# Patient Record
Sex: MALE | Race: WHITE | NOT HISPANIC OR LATINO | Employment: OTHER | URBAN - METROPOLITAN AREA
[De-identification: names, ages, dates, MRNs, and addresses within clinical notes are randomized per-mention and may not be internally consistent; named-entity substitution may affect disease eponyms.]

---

## 2017-07-17 ENCOUNTER — HOSPITAL ENCOUNTER (INPATIENT)
Facility: MEDICAL CENTER | Age: 67
LOS: 3 days | DRG: 563 | End: 2017-07-20
Attending: EMERGENCY MEDICINE | Admitting: INTERNAL MEDICINE
Payer: MEDICARE

## 2017-07-17 ENCOUNTER — APPOINTMENT (OUTPATIENT)
Dept: RADIOLOGY | Facility: MEDICAL CENTER | Age: 67
DRG: 563 | End: 2017-07-17
Attending: EMERGENCY MEDICINE
Payer: MEDICARE

## 2017-07-17 ENCOUNTER — RESOLUTE PROFESSIONAL BILLING HOSPITAL PROF FEE (OUTPATIENT)
Dept: HOSPITALIST | Facility: MEDICAL CENTER | Age: 67
End: 2017-07-17
Payer: MEDICARE

## 2017-07-17 DIAGNOSIS — G89.4 CHRONIC PAIN SYNDROME: ICD-10-CM

## 2017-07-17 DIAGNOSIS — G82.20 PARAPLEGIA (HCC): ICD-10-CM

## 2017-07-17 DIAGNOSIS — M25.532 LEFT WRIST PAIN: ICD-10-CM

## 2017-07-17 PROBLEM — M25.539 WRIST PAIN: Status: ACTIVE | Noted: 2017-07-17

## 2017-07-17 PROBLEM — R53.81 DEBILITY: Status: ACTIVE | Noted: 2017-07-17

## 2017-07-17 PROBLEM — M54.9 BACK PAIN: Status: ACTIVE | Noted: 2017-07-17

## 2017-07-17 LAB
ALBUMIN SERPL BCP-MCNC: 3.2 G/DL (ref 3.2–4.9)
ALBUMIN/GLOB SERPL: 0.8 G/DL
ALP SERPL-CCNC: 52 U/L (ref 30–99)
ALT SERPL-CCNC: 17 U/L (ref 2–50)
ANION GAP SERPL CALC-SCNC: 17 MMOL/L (ref 0–11.9)
APTT PPP: 33.7 SEC (ref 24.7–36)
AST SERPL-CCNC: 29 U/L (ref 12–45)
BASOPHILS # BLD AUTO: 0.3 % (ref 0–1.8)
BASOPHILS # BLD: 0.01 K/UL (ref 0–0.12)
BILIRUB SERPL-MCNC: 1.7 MG/DL (ref 0.1–1.5)
BUN SERPL-MCNC: 14 MG/DL (ref 8–22)
CALCIUM SERPL-MCNC: 8.6 MG/DL (ref 8.4–10.2)
CHLORIDE SERPL-SCNC: 103 MMOL/L (ref 96–112)
CK SERPL-CCNC: 107 U/L (ref 0–154)
CO2 SERPL-SCNC: 15 MMOL/L (ref 20–33)
CREAT SERPL-MCNC: 0.87 MG/DL (ref 0.5–1.4)
EOSINOPHIL # BLD AUTO: 0.1 K/UL (ref 0–0.51)
EOSINOPHIL NFR BLD: 2.6 % (ref 0–6.9)
ERYTHROCYTE [DISTWIDTH] IN BLOOD BY AUTOMATED COUNT: 40.5 FL (ref 35.9–50)
GFR SERPL CREATININE-BSD FRML MDRD: >60 ML/MIN/1.73 M 2
GLOBULIN SER CALC-MCNC: 4.1 G/DL (ref 1.9–3.5)
GLUCOSE SERPL-MCNC: 89 MG/DL (ref 65–99)
HCT VFR BLD AUTO: 44.2 % (ref 42–52)
HGB BLD-MCNC: 15.6 G/DL (ref 14–18)
IMM GRANULOCYTES # BLD AUTO: 0.01 K/UL (ref 0–0.11)
IMM GRANULOCYTES NFR BLD AUTO: 0.3 % (ref 0–0.9)
INR PPP: 1.03 (ref 0.87–1.13)
LYMPHOCYTES # BLD AUTO: 0.81 K/UL (ref 1–4.8)
LYMPHOCYTES NFR BLD: 20.9 % (ref 22–41)
MAGNESIUM SERPL-MCNC: 1.8 MG/DL (ref 1.5–2.5)
MCH RBC QN AUTO: 32.1 PG (ref 27–33)
MCHC RBC AUTO-ENTMCNC: 35.3 G/DL (ref 33.7–35.3)
MCV RBC AUTO: 90.9 FL (ref 81.4–97.8)
MONOCYTES # BLD AUTO: 0.35 K/UL (ref 0–0.85)
MONOCYTES NFR BLD AUTO: 9 % (ref 0–13.4)
NEUTROPHILS # BLD AUTO: 2.59 K/UL (ref 1.82–7.42)
NEUTROPHILS NFR BLD: 66.9 % (ref 44–72)
NRBC # BLD AUTO: 0 K/UL
NRBC BLD AUTO-RTO: 0 /100 WBC
PHOSPHATE SERPL-MCNC: 3.7 MG/DL (ref 2.5–4.5)
PLATELET # BLD AUTO: 168 K/UL (ref 164–446)
PMV BLD AUTO: 10.8 FL (ref 9–12.9)
POTASSIUM SERPL-SCNC: 3.6 MMOL/L (ref 3.6–5.5)
PROT SERPL-MCNC: 7.3 G/DL (ref 6–8.2)
PROTHROMBIN TIME: 13.3 SEC (ref 12–14.6)
RBC # BLD AUTO: 4.86 M/UL (ref 4.7–6.1)
SODIUM SERPL-SCNC: 135 MMOL/L (ref 135–145)
WBC # BLD AUTO: 3.9 K/UL (ref 4.8–10.8)

## 2017-07-17 PROCEDURE — A9270 NON-COVERED ITEM OR SERVICE: HCPCS | Performed by: INTERNAL MEDICINE

## 2017-07-17 PROCEDURE — 72128 CT CHEST SPINE W/O DYE: CPT

## 2017-07-17 PROCEDURE — 700105 HCHG RX REV CODE 258: Performed by: INTERNAL MEDICINE

## 2017-07-17 PROCEDURE — 94760 N-INVAS EAR/PLS OXIMETRY 1: CPT

## 2017-07-17 PROCEDURE — 99285 EMERGENCY DEPT VISIT HI MDM: CPT

## 2017-07-17 PROCEDURE — 85610 PROTHROMBIN TIME: CPT

## 2017-07-17 PROCEDURE — 700102 HCHG RX REV CODE 250 W/ 637 OVERRIDE(OP): Performed by: INTERNAL MEDICINE

## 2017-07-17 PROCEDURE — 85730 THROMBOPLASTIN TIME PARTIAL: CPT

## 2017-07-17 PROCEDURE — 36415 COLL VENOUS BLD VENIPUNCTURE: CPT

## 2017-07-17 PROCEDURE — 82550 ASSAY OF CK (CPK): CPT

## 2017-07-17 PROCEDURE — 85025 COMPLETE CBC W/AUTO DIFF WBC: CPT

## 2017-07-17 PROCEDURE — 73110 X-RAY EXAM OF WRIST: CPT | Mod: LT

## 2017-07-17 PROCEDURE — 84100 ASSAY OF PHOSPHORUS: CPT

## 2017-07-17 PROCEDURE — 83735 ASSAY OF MAGNESIUM: CPT

## 2017-07-17 PROCEDURE — 700105 HCHG RX REV CODE 258: Performed by: EMERGENCY MEDICINE

## 2017-07-17 PROCEDURE — 99223 1ST HOSP IP/OBS HIGH 75: CPT | Performed by: INTERNAL MEDICINE

## 2017-07-17 PROCEDURE — 80053 COMPREHEN METABOLIC PANEL: CPT

## 2017-07-17 PROCEDURE — 71010 DX-CHEST-PORTABLE (1 VIEW): CPT

## 2017-07-17 PROCEDURE — 72131 CT LUMBAR SPINE W/O DYE: CPT

## 2017-07-17 PROCEDURE — 770006 HCHG ROOM/CARE - MED/SURG/GYN SEMI*

## 2017-07-17 RX ORDER — AMOXICILLIN 250 MG
2 CAPSULE ORAL 2 TIMES DAILY
Status: DISCONTINUED | OUTPATIENT
Start: 2017-07-17 | End: 2017-07-20 | Stop reason: HOSPADM

## 2017-07-17 RX ORDER — LORAZEPAM 2 MG/ML
2 INJECTION INTRAMUSCULAR PRN
Status: ACTIVE | OUTPATIENT
Start: 2017-07-17 | End: 2017-07-17

## 2017-07-17 RX ORDER — LORAZEPAM 2 MG/ML
0.5 INJECTION INTRAMUSCULAR EVERY 6 HOURS PRN
Status: DISCONTINUED | OUTPATIENT
Start: 2017-07-17 | End: 2017-07-20 | Stop reason: HOSPADM

## 2017-07-17 RX ORDER — OXYCODONE HYDROCHLORIDE 5 MG/1
5 TABLET ORAL
Status: DISCONTINUED | OUTPATIENT
Start: 2017-07-17 | End: 2017-07-20 | Stop reason: HOSPADM

## 2017-07-17 RX ORDER — BACLOFEN 10 MG/1
10 TABLET ORAL 3 TIMES DAILY
Status: DISCONTINUED | OUTPATIENT
Start: 2017-07-17 | End: 2017-07-20 | Stop reason: HOSPADM

## 2017-07-17 RX ORDER — LOSARTAN POTASSIUM 100 MG/1
100 TABLET ORAL
Status: ON HOLD | COMMUNITY
End: 2017-07-20

## 2017-07-17 RX ORDER — LABETALOL HYDROCHLORIDE 5 MG/ML
10 INJECTION, SOLUTION INTRAVENOUS EVERY 4 HOURS PRN
Status: DISCONTINUED | OUTPATIENT
Start: 2017-07-17 | End: 2017-07-20 | Stop reason: HOSPADM

## 2017-07-17 RX ORDER — POLYETHYLENE GLYCOL 3350 17 G/17G
1 POWDER, FOR SOLUTION ORAL
Status: DISCONTINUED | OUTPATIENT
Start: 2017-07-17 | End: 2017-07-20 | Stop reason: HOSPADM

## 2017-07-17 RX ORDER — LORAZEPAM 1 MG/1
1 TABLET ORAL
Status: DISCONTINUED | OUTPATIENT
Start: 2017-07-17 | End: 2017-07-20 | Stop reason: HOSPADM

## 2017-07-17 RX ORDER — ONDANSETRON 2 MG/ML
4 INJECTION INTRAMUSCULAR; INTRAVENOUS EVERY 4 HOURS PRN
Status: DISCONTINUED | OUTPATIENT
Start: 2017-07-17 | End: 2017-07-20 | Stop reason: HOSPADM

## 2017-07-17 RX ORDER — ONDANSETRON 4 MG/1
4 TABLET, ORALLY DISINTEGRATING ORAL EVERY 4 HOURS PRN
Status: DISCONTINUED | OUTPATIENT
Start: 2017-07-17 | End: 2017-07-20 | Stop reason: HOSPADM

## 2017-07-17 RX ORDER — BISACODYL 10 MG
10 SUPPOSITORY, RECTAL RECTAL
Status: DISCONTINUED | OUTPATIENT
Start: 2017-07-17 | End: 2017-07-20 | Stop reason: HOSPADM

## 2017-07-17 RX ORDER — ACETAMINOPHEN 325 MG/1
650 TABLET ORAL EVERY 6 HOURS PRN
Status: DISCONTINUED | OUTPATIENT
Start: 2017-07-17 | End: 2017-07-20 | Stop reason: HOSPADM

## 2017-07-17 RX ORDER — ACETYLCYSTEINE 200 MG/ML
3 SOLUTION ORAL; RESPIRATORY (INHALATION) 4 TIMES DAILY
Status: DISCONTINUED | OUTPATIENT
Start: 2017-07-17 | End: 2017-07-18 | Stop reason: ALTCHOICE

## 2017-07-17 RX ORDER — LORAZEPAM 1 MG/1
0.5 TABLET ORAL EVERY 6 HOURS PRN
Status: DISCONTINUED | OUTPATIENT
Start: 2017-07-17 | End: 2017-07-20 | Stop reason: HOSPADM

## 2017-07-17 RX ORDER — SODIUM CHLORIDE 9 MG/ML
1000 INJECTION, SOLUTION INTRAVENOUS ONCE
Status: COMPLETED | OUTPATIENT
Start: 2017-07-17 | End: 2017-07-17

## 2017-07-17 RX ORDER — MORPHINE SULFATE 4 MG/ML
2 INJECTION, SOLUTION INTRAMUSCULAR; INTRAVENOUS
Status: DISCONTINUED | OUTPATIENT
Start: 2017-07-17 | End: 2017-07-20 | Stop reason: HOSPADM

## 2017-07-17 RX ORDER — SODIUM CHLORIDE, SODIUM LACTATE, POTASSIUM CHLORIDE, CALCIUM CHLORIDE 600; 310; 30; 20 MG/100ML; MG/100ML; MG/100ML; MG/100ML
INJECTION, SOLUTION INTRAVENOUS CONTINUOUS
Status: DISCONTINUED | OUTPATIENT
Start: 2017-07-17 | End: 2017-07-18

## 2017-07-17 RX ORDER — OXYCODONE HYDROCHLORIDE 5 MG/1
2.5 TABLET ORAL
Status: DISCONTINUED | OUTPATIENT
Start: 2017-07-17 | End: 2017-07-20 | Stop reason: HOSPADM

## 2017-07-17 RX ADMIN — OXYCODONE HYDROCHLORIDE 5 MG: 5 TABLET ORAL at 20:33

## 2017-07-17 RX ADMIN — LORAZEPAM 1 MG: 1 TABLET ORAL at 22:38

## 2017-07-17 RX ADMIN — OXYCODONE HYDROCHLORIDE 5 MG: 5 TABLET ORAL at 17:21

## 2017-07-17 RX ADMIN — BACLOFEN 10 MG: 10 TABLET ORAL at 19:27

## 2017-07-17 RX ADMIN — SODIUM CHLORIDE, POTASSIUM CHLORIDE, SODIUM LACTATE AND CALCIUM CHLORIDE: 600; 310; 30; 20 INJECTION, SOLUTION INTRAVENOUS at 17:18

## 2017-07-17 RX ADMIN — LORAZEPAM 0.5 MG: 1 TABLET ORAL at 17:19

## 2017-07-17 RX ADMIN — SODIUM CHLORIDE 1000 ML: 9 INJECTION, SOLUTION INTRAVENOUS at 14:49

## 2017-07-17 ASSESSMENT — LIFESTYLE VARIABLES
TOTAL SCORE: 0
EVER FELT BAD OR GUILTY ABOUT YOUR DRINKING: NO
HAVE YOU EVER FELT YOU SHOULD CUT DOWN ON YOUR DRINKING: NO
TOTAL SCORE: 0
CONSUMPTION TOTAL: POSITIVE
HAVE PEOPLE ANNOYED YOU BY CRITICIZING YOUR DRINKING: NO
AVERAGE NUMBER OF DAYS PER WEEK YOU HAVE A DRINK CONTAINING ALCOHOL: 7
ALCOHOL_USE: YES
ON A TYPICAL DAY WHEN YOU DRINK ALCOHOL HOW MANY DRINKS DO YOU HAVE: 3
EVER_SMOKED: NEVER
HOW MANY TIMES IN THE PAST YEAR HAVE YOU HAD 5 OR MORE DRINKS IN A DAY: 0
EVER HAD A DRINK FIRST THING IN THE MORNING TO STEADY YOUR NERVES TO GET RID OF A HANGOVER: NO
EVER_SMOKED: NEVER
TOTAL SCORE: 0

## 2017-07-17 ASSESSMENT — PAIN SCALES - GENERAL
PAINLEVEL_OUTOF10: 3
PAINLEVEL_OUTOF10: 3
PAINLEVEL_OUTOF10: 8

## 2017-07-17 NOTE — ED NOTES
Medicinal interventions carried out per ERP orders.  Hospitalist in to evaluate patient for further treatment. Will continue to monitor.
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
## 2017-07-17 NOTE — IP AVS SNAPSHOT
Cognection Access Code: [email or web address]  Expires: 8/19/2017  2:12 PM    Your email address is not on file at [facility].  Email Addresses are required for you to sign up for Cognection, please contact [phone] to verify your personal information and to provide your email address prior to attempting to register for Cognection.    Bernardino Hawthorne  [address]  [place] [address]    Cognection  A secure, online tool to manage your health information     [facility]’s Cognection® is a secure, online tool that connects you to your personalized health information from the privacy of your home -- day or night - making it very easy for you to manage your healthcare. Once the activation process is completed, you can even access your medical information using the Cognection janae, which is available for free in the Apple Janae store or Google Play store.     To learn more about Cognection, visit [email or web address]    There are two levels of access available (as shown below):   My Chart Features  [facility] Primary Care Doctor [facility]  Specialists [facility]  Urgent  Care Non-[facility] Primary Care Doctor   Email your healthcare team securely and privately 24/7 X X X    Manage appointments: schedule your next appointment; view details of past/upcoming appointments X      Request prescription refills. X      View recent personal medical records, including lab and immunizations X X X X   View health record, including health history, allergies, medications X X X X   Read reports about your outpatient visits, procedures, consult and ER notes X X X X   See your discharge summary, which is a recap of your hospital and/or ER visit that includes your diagnosis, lab results, and care plan X X  X     How to register for Rue89t:  Once your e-mail address has been verified, follow the following steps to sign up for Rue89t.     1. Go to  [email or web address]  2. Click on the Sign Up Now box, which takes you to the New Member Sign Up page. You  will need to provide the following information:  a. Enter your Jumblets Access Code exactly as it appears at the top of this page. (You will not need to use this code after you’ve completed the sign-up process. If you do not sign up before the expiration date, you must request a new code.)   b. Enter your date of birth.   c. Enter your home email address.   d. Click Submit, and follow the next screen’s instructions.  3. Create a itembaset ID. This will be your Jumblets login ID and cannot be changed, so think of one that is secure and easy to remember.  4. Create a Jumblets password. You can change your password at any time.  5. Enter your Password Reset Question and Answer. This can be used at a later time if you forget your password.   6. Enter your e-mail address. This allows you to receive e-mail notifications when new information is available in Jumblets.  7. Click Sign Up. You can now view your health information.    For assistance activating your Jumblets account, call [phone]

## 2017-07-17 NOTE — IP AVS SNAPSHOT
" <p align=\"LEFT\"><IMG SRC=\"//[email or web address]$/[email or web address]\" alt=\"Image\" WIDTH=\"50%\" HEIGHT=\"200\" BORDER=\"\"></p>                   Name:Bernardino Hawthorne  Medical Record Number:[id number]  CSN: [id number]    YOB: 1950   Age: 67 y.o.  Sex: male  HT:1.93 m (6' 4\") WT: 90.719 kg (200 lb)          Admit Date: 7/17/2017     Discharge Date:   Today's Date: 7/20/2017  Attending Doctor:  Brant Aponte M.D.                  Allergies:  Penicillins          Your appointments     Jul 21, 2017  3:00 PM   CARE MANAGER TELEPHONE VISIT with CARE MANAGER   [address] [id number] [phone]           ***IMPORTANT**** This is a phone visit only. Do not come into the office. The Care Manager will call you at the scheduled time for Care Manager Telephone Visit.            Jul 25, 2017  3:00 PM   Established Patient with NAHOMY Potter   [address] [id number] [phone]           You will be receiving a confirmation call a few days before your appointment from our automated call confirmation system.              Follow-up Information     1. Follow up with Annmarie Terrell M.D. In 1 week.    Specialty:  Family Medicine    Contact information    [address]  [facility] [id number] [phone]           Medication List      Take these Medications        Instructions    ATIVAN 1 MG Tabs   Generic drug:  lorazepam    Take 1 mg by mouth every bedtime.   Dose:  1 mg       baclofen 10 MG Tabs   Commonly known as:  LIORESAL    Take 1 Tab by mouth every 8 hours as needed (spasms).   Dose:  10 mg       oxycodone immediate-release 5 MG Tabs   Commonly known as:  ROXICODONE    Take 1 Tab by mouth every 6 hours as needed (moderate- severe pain).   Dose:  5 mg         "
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
## 2017-07-17 NOTE — IP AVS SNAPSHOT
" Home Care Instructions                                                                                                                  Name:Bernardino Hawthorne  Medical Record Number:[id number]  CSN: [id number]    YOB: 1950   Age: 67 y.o.  Sex: male  HT:1.93 m (6' 4\") WT: 90.719 kg (200 lb)          Admit Date: 7/17/2017     Discharge Date:   Today's Date: 7/20/2017  Attending Doctor:  Brant Aponte M.D.                  Allergies:  Penicillins            Discharge Instructions       Discharge Instructions    Discharged to home by taxi with self. Discharged via wheelchair, hospital escort: Yes.  Special equipment needed: Wheelchair    Be sure to schedule a follow-up appointment with your primary care doctor or any specialists as instructed.     Discharge Plan:   Diet Plan: Discussed  Activity Level: Discussed  Confirmed Follow up Appointment: Appointment Scheduled  Confirmed Symptoms Management: Discussed  Medication Reconciliation Updated: Yes  Influenza Vaccine Indication: Patient Refuses    I understand that a diet low in cholesterol, fat, and sodium is recommended for good health. Unless I have been given specific instructions below for another diet, I accept this instruction as my diet prescription.   Other diet: as tolerated    Special Instructions: None    · Is patient discharged on Warfarin / Coumadin?   No     · Is patient Post Blood Transfusion?  No  Wrist Pain  Wrist injuries are frequent in adults and children. A sprain is an injury to the ligaments that hold your bones together. A strain is an injury to muscle or muscle cord-like structures (tendons) from stretching or pulling. Generally, when wrists are moderately tender to touch following a fall or injury, a break in the bone (fracture) may be present. Most wrist sprains or strains are better in 3 to 5 days, but complete healing may take several weeks.  HOME CARE INSTRUCTIONS   · Put ice on the injured area.  · Put ice in a plastic bag.  · Place a " towel between your skin and the bag.  · Leave the ice on for 15-20 minutes, 3-4 times a day, for the first 2 days, or as directed by your health care provider.  · Keep your arm raised above the level of your heart whenever possible to reduce swelling and pain.  · Rest the injured area for at least 48 hours or as directed by your health care provider.  · If a splint or elastic bandage has been applied, use it for as long as directed by your health care provider or until seen by a health care provider for a follow-up exam.  · Only take over-the-counter or prescription medicines for pain, discomfort, or fever as directed by your health care provider.  · Keep all follow-up appointments. You may need to follow up with a specialist or have follow-up X-rays. Improvement in pain level is not a guarantee that you did not fracture a bone in your wrist. The only way to determine whether or not you have a broken bone is by X-ray.  SEEK IMMEDIATE MEDICAL CARE IF:   · Your fingers are swollen, very red, white, or cold and blue.  · Your fingers are numb or tingling.  · You have increasing pain.  · You have difficulty moving your fingers.  MAKE SURE YOU:   · Understand these instructions.  · Will watch your condition.  · Will get help right away if you are not doing well or get worse.     This information is not intended to replace advice given to you by your health care provider. Make sure you discuss any questions you have with your health care provider.     Document Released: 09/27/2006 Document Revised: 12/23/2014 Document Reviewed: 02/08/2012  [employer] Interactive Patient Education ©2016 [employer]  Back Pain, Adult  Back pain is very common in adults. The cause of back pain is rarely dangerous and the pain often gets better over time. The cause of your back pain may not be known. Some common causes of back pain include:  · Strain of the muscles or ligaments supporting the spine.  · Wear and tear (degeneration) of the  spinal disks.  · Arthritis.  · Direct injury to the back.  For many people, back pain may return. Since back pain is rarely dangerous, most people can learn to manage this condition on their own.  HOME CARE INSTRUCTIONS  Watch your back pain for any changes. The following actions may help to lessen any discomfort you are feeling:  · Remain active. It is stressful on your back to sit or  one place for long periods of time. Do not sit, drive, or  one place for more than 30 minutes at a time. Take short walks on even surfaces as soon as you are able. Try to increase the length of time you walk each day.  · Exercise regularly as directed by your health care provider. Exercise helps your back heal faster. It also helps avoid future injury by keeping your muscles strong and flexible.  · Do not stay in bed. Resting more than 1-2 days can delay your recovery.  · Pay attention to your body when you bend and lift. The most comfortable positions are those that put less stress on your recovering back. Always use proper lifting techniques, including:  ¨ Bending your knees.  ¨ Keeping the load close to your body.  ¨ Avoiding twisting.  · Find a comfortable position to sleep. Use a firm mattress and lie on your side with your knees slightly bent. If you lie on your back, put a pillow under your knees.  · Avoid feeling anxious or stressed. Stress increases muscle tension and can worsen back pain. It is important to recognize when you are anxious or stressed and learn ways to manage it, such as with exercise.  · Take medicines only as directed by your health care provider. Over-the-counter medicines to reduce pain and inflammation are often the most helpful. Your health care provider may prescribe muscle relaxant drugs. These medicines help dull your pain so you can more quickly return to your normal activities and healthy exercise.  · Apply ice to the injured area:  ¨ Put ice in a plastic bag.  ¨ Place a towel  between your skin and the bag.  ¨ Leave the ice on for 20 minutes, 2-3 times a day for the first 2-3 days. After that, ice and heat may be alternated to reduce pain and spasms.  · Maintain a healthy weight. Excess weight puts extra stress on your back and makes it difficult to maintain good posture.  SEEK MEDICAL CARE IF:  · You have pain that is not relieved with rest or medicine.  · You have increasing pain going down into the legs or buttocks.  · You have pain that does not improve in one week.  · You have night pain.  · You lose weight.  · You have a fever or chills.  SEEK IMMEDIATE MEDICAL CARE IF:   · You develop new bowel or bladder control problems.  · You have unusual weakness or numbness in your arms or legs.  · You develop nausea or vomiting.  · You develop abdominal pain.  · You feel faint.     This information is not intended to replace advice given to you by your health care provider. Make sure you discuss any questions you have with your health care provider.     Document Released: 12/18/2006 Document Revised: 01/08/2016 Document Reviewed: 04/21/2015  [employer] Interactive Patient Education ©2016 [employer]      Depression / Suicide Risk    As you are discharged from this [facility] facility, it is important to learn how to keep safe from harming yourself.    Recognize the warning signs:  · Abrupt changes in personality, positive or negative- including increase in energy   · Giving away possessions  · Change in eating patterns- significant weight changes-  positive or negative  · Change in sleeping patterns- unable to sleep or sleeping all the time   · Unwillingness or inability to communicate  · Depression  · Unusual sadness, discouragement and loneliness  · Talk of wanting to die  · Neglect of personal appearance   · Rebelliousness- reckless behavior  · Withdrawal from people/activities they love  · Confusion- inability to concentrate     If you or a loved one observes any of these behaviors or  has concerns about self-harm, here's what you can do:  · Talk about it- your feelings and reasons for harming yourself  · Remove any means that you might use to hurt yourself (examples: pills, rope, extension cords, firearm)  · Get professional help from the community (Mental Health, Substance Abuse, psychological counseling)  · Do not be alone:Call your Safe Contact- someone whom you trust who will be there for you.  · Call your local CRISIS HOTLINE 557-0560 or 402-559-7848  · Call your local Children's Mobile Crisis Response Team [place] (466) 845-2997 or www.vzaar  · Call the toll free National Suicide Prevention Hotlines   · National Suicide Prevention Lifeline 196-667-DFKQ (4161)  · [employer] Line Network 800-SUICIDE (746-9960)        Your appointments     Jul 21, 2017  3:00 PM   CARE MANAGER TELEPHONE VISIT with CARE MANAGER   [address]  [facility] [address] [phone]           ***IMPORTANT**** This is a phone visit only. Do not come into the office. The Care Manager will call you at the scheduled time for Care Manager Telephone Visit.            Jul 25, 2017  3:00 PM   Established Patient with NAHOMY Potter   [address]  [facility] [id number] [phone]           You will be receiving a confirmation call a few days before your appointment from our automated call confirmation system.              Follow-up Information     1. Follow up with Annmarie Terrell M.D. In 1 week.    Specialty:  Family Medicine    Contact information    [address] [facility] [address]  [facility] [id number] [phone]           Discharge Medication Instructions:    Below are the medications your physician expects you to take upon discharge:    Review all your home medications and newly ordered medications with your doctor and/or pharmacist. Follow medication instructions as directed by your doctor and/or  pharmacist.    Please keep your medication list with you and share with your physician.               Medication List      START taking these medications        Instructions    Morning Afternoon Evening Bedtime    baclofen 10 MG Tabs   Last time this was given:  10 mg on 7/19/2017  8:57 AM   Commonly known as:  LIORESAL        Take 1 Tab by mouth every 8 hours as needed (spasms).   Dose:  10 mg                        oxycodone immediate-release 5 MG Tabs   Last time this was given:  5 mg on 7/20/2017 10:39 AM   Commonly known as:  ROXICODONE        Take 1 Tab by mouth every 6 hours as needed (moderate- severe pain).   Dose:  5 mg                          CONTINUE taking these medications        Instructions    Morning Afternoon Evening Bedtime    ATIVAN 1 MG Tabs   Last time this was given:  1 mg on 7/17/2017 10:38 PM   Generic drug:  lorazepam        Take 1 mg by mouth every bedtime.   Dose:  1 mg                          STOP taking these medications     losartan 100 MG Tabs   Commonly known as:  COZAAR                    Where to Get Your Medications      These medications were sent to [facility][id number] - [place] - [address]  [address] [facility] [address]     Phone:  [phone]    - baclofen 10 MG Tabs      You can get these medications from any pharmacy     Bring a paper prescription for each of these medications    - oxycodone immediate-release 5 MG Tabs            Orders for after discharge     DME Other    Complete by:  As directed        REFERRAL TO HOME HEALTH    Complete by:  As directed    Home health will create and establish a plan of care             Instructions           Diet / Nutrition:    Follow any diet instructions given to you by your doctor or the dietician, including how much salt (sodium) you are allowed each day.    If you are overweight, talk to your doctor about a weight reduction plan.    Activity:    Remain physically active following your doctor's instructions about  exercise and activity.    Rest often.     Any time you become even a little tired or short of breath, SIT DOWN and rest.    Worsening Symptoms:    Report any of the following signs and symptoms to the doctor's office immediately:    *Pain of jaw, arm, or neck  *Chest pain not relieved by medication                               *Dizziness or loss of consciousness  *Difficulty breathing even when at rest   *More tired than usual                                       *Bleeding drainage or swelling of surgical site  *Swelling of feet, ankles, legs or stomach                 *Fever (>100ºF)  *Pink or blood tinged sputum  *Weight gain (3lbs/day or 5lbs /week)           *Shock from internal defibrillator (if applicable)  *Palpitations or irregular heartbeats                *Cool and/or numb extremities    Stroke Awareness    Common Risk Factors for Stroke include:    Age  Atrial Fibrillation  Carotid Artery Stenosis  Diabetes Mellitus  Excessive alcohol consumption  High blood pressure  Overweight   Physical inactivity  Smoking    Warning signs and symptoms of a stroke include:    *Sudden numbness or weakness of the face, arm or leg (especially on one side of the body).  *Sudden confusion, trouble speaking or understanding.  *Sudden trouble seeing in one or both eyes.  *Sudden trouble walking, dizziness, loss of balance or coordination.Sudden severe headache with no known cause.    It is very important to get treatment quickly when a stroke occurs. If you experience any of the above warning signs, call 911 immediately.                   Disclaimer         Quit Smoking / Tobacco Use:    I understand the use of any tobacco products increases my chance of suffering from future heart disease or stroke and could cause other illnesses which may shorten my life. Quitting the use of tobacco products is the single most important thing I can do to improve my health. For further information on smoking / tobacco cessation call a Toll  Free Quit Line at 1-896.157.3011 (*National Cancer [place]) or 1-593.633.4551 (American Lung Association) or you can access the web based program at www.lungusa.org.    [place] Tobacco Users Help Line:  (684) 962-1760       Toll Free: 1-351.487.2968  Quit Tobacco Program [facility] (648)586-0928    Crisis Hotline:    [place] Crisis Hotline:  6-202-STJMXKT or 1-776.359.4660    [place] Crisis Hotline:    1-360.455.6351 or 312-873-9168    Discharge Survey:   Thank you for choosing [facility]. We hope we did everything we could to make your hospital stay a pleasant one. You may be receiving a phone survey and we would appreciate your time and participation in answering the questions. Your input is very valuable to us in our efforts to improve our service to our patients and their families.        My signature on this form indicates that:    1. I have reviewed and understand the above information.  2. My questions regarding this information have been answered to my satisfaction.  3. I have formulated a plan with my discharge nurse to obtain my prescribed medications for home.                  Disclaimer         __________________________________                     __________       ________                       Patient Signature                                                 Date                    Time

## 2017-07-17 NOTE — IP AVS SNAPSHOT
7/20/2017    Bernardino Hawthorne  [address]  [place] [address]    Dear Bernardino:    [facility] wants to ensure your discharge home is safe and you or your loved ones have had all of your questions answered regarding your care after you leave the hospital.    Below is a list of resources and contact information should you have any questions regarding your hospital stay, follow-up instructions, or active medical symptoms.    Questions or Concerns Regarding… Contact   Medical Questions Related to Your Discharge  (7 days a week, 8am-5pm) Contact a Nurse Care Coordinator   [phone]   Medical Questions Not Related to Your Discharge  (24 hours a day / 7 days a week)  Contact the Nurse Health Line   [phone]    Medications or Discharge Instructions Refer to your discharge packet   or contact your [facility] Primary Care Provider   [phone]   Follow-up Appointment(s) Schedule your appointment via [employer]   or contact Scheduling [phone]   Billing Review your statement via [employer]  or contact Billing [phone]   Medical Records Review your records via [employer]   or contact Medical Records [phone]     You may receive a telephone call within two days of discharge. This call is to make certain you understand your discharge instructions and have the opportunity to have any questions answered. You can also easily access your medical information, test results and upcoming appointments via the [employer] free online health management tool. You can learn more and sign up at [email or web address]. For assistance setting up your [employer] account, please call [phone].    Once again, we want to ensure your discharge home is safe and that you have a clear understanding of any next steps in your care. If you have any questions or concerns, please do not hesitate to contact us, we are here for you. Thank you for choosing [facility] for your healthcare needs.    Sincerely,    Your [facility] Healthcare Team

## 2017-07-17 NOTE — ED NOTES
Pt BIB [facility] c/o twisted his lower back while in wheelchair,placed weight on his L wrist sustaining instant pain.  Pt had T9 fx in 1979. Pt is wheelchair bound.

## 2017-07-17 NOTE — ED NOTES
ERP at bedside. Pt agrees with plan of care discussed by ERP. AIDET acknowledged with patient. Pt to CT via gurney. Zahcrpallavi in low position, side rail up for pt safety. Call light within reach. Will continue to monitor.

## 2017-07-18 ENCOUNTER — APPOINTMENT (OUTPATIENT)
Dept: RADIOLOGY | Facility: MEDICAL CENTER | Age: 67
DRG: 563 | End: 2017-07-18
Attending: HOSPITALIST
Payer: MEDICARE

## 2017-07-18 PROBLEM — G89.29 CHRONIC PAIN: Status: ACTIVE | Noted: 2017-07-18

## 2017-07-18 PROBLEM — E87.6 HYPOKALEMIA: Status: ACTIVE | Noted: 2017-07-18

## 2017-07-18 PROBLEM — G82.20 PARAPLEGIA (HCC): Status: ACTIVE | Noted: 2017-07-18

## 2017-07-18 LAB
ALBUMIN SERPL BCP-MCNC: 2.7 G/DL (ref 3.2–4.9)
ALBUMIN/GLOB SERPL: 0.8 G/DL
ALP SERPL-CCNC: 43 U/L (ref 30–99)
ALT SERPL-CCNC: 14 U/L (ref 2–50)
ANION GAP SERPL CALC-SCNC: 13 MMOL/L (ref 0–11.9)
AST SERPL-CCNC: 22 U/L (ref 12–45)
BILIRUB SERPL-MCNC: 1.3 MG/DL (ref 0.1–1.5)
BUN SERPL-MCNC: 14 MG/DL (ref 8–22)
CALCIUM SERPL-MCNC: 8 MG/DL (ref 8.4–10.2)
CHLORIDE SERPL-SCNC: 106 MMOL/L (ref 96–112)
CO2 SERPL-SCNC: 17 MMOL/L (ref 20–33)
CREAT SERPL-MCNC: 0.74 MG/DL (ref 0.5–1.4)
ERYTHROCYTE [DISTWIDTH] IN BLOOD BY AUTOMATED COUNT: 40.7 FL (ref 35.9–50)
GFR SERPL CREATININE-BSD FRML MDRD: >60 ML/MIN/1.73 M 2
GLOBULIN SER CALC-MCNC: 3.2 G/DL (ref 1.9–3.5)
GLUCOSE SERPL-MCNC: 75 MG/DL (ref 65–99)
HCT VFR BLD AUTO: 39.6 % (ref 42–52)
HGB BLD-MCNC: 13.7 G/DL (ref 14–18)
MCH RBC QN AUTO: 31.9 PG (ref 27–33)
MCHC RBC AUTO-ENTMCNC: 34.6 G/DL (ref 33.7–35.3)
MCV RBC AUTO: 92.1 FL (ref 81.4–97.8)
PLATELET # BLD AUTO: 162 K/UL (ref 164–446)
PMV BLD AUTO: 10.9 FL (ref 9–12.9)
POTASSIUM SERPL-SCNC: 3.4 MMOL/L (ref 3.6–5.5)
PROT SERPL-MCNC: 5.9 G/DL (ref 6–8.2)
RBC # BLD AUTO: 4.3 M/UL (ref 4.7–6.1)
SODIUM SERPL-SCNC: 136 MMOL/L (ref 135–145)
WBC # BLD AUTO: 3.7 K/UL (ref 4.8–10.8)

## 2017-07-18 PROCEDURE — 85027 COMPLETE CBC AUTOMATED: CPT

## 2017-07-18 PROCEDURE — A9270 NON-COVERED ITEM OR SERVICE: HCPCS | Performed by: INTERNAL MEDICINE

## 2017-07-18 PROCEDURE — 97535 SELF CARE MNGMENT TRAINING: CPT

## 2017-07-18 PROCEDURE — 72148 MRI LUMBAR SPINE W/O DYE: CPT

## 2017-07-18 PROCEDURE — 700102 HCHG RX REV CODE 250 W/ 637 OVERRIDE(OP): Performed by: INTERNAL MEDICINE

## 2017-07-18 PROCEDURE — 770006 HCHG ROOM/CARE - MED/SURG/GYN SEMI*

## 2017-07-18 PROCEDURE — 700111 HCHG RX REV CODE 636 W/ 250 OVERRIDE (IP): Performed by: HOSPITALIST

## 2017-07-18 PROCEDURE — 700111 HCHG RX REV CODE 636 W/ 250 OVERRIDE (IP): Performed by: INTERNAL MEDICINE

## 2017-07-18 PROCEDURE — 99232 SBSQ HOSP IP/OBS MODERATE 35: CPT | Performed by: HOSPITALIST

## 2017-07-18 PROCEDURE — A9270 NON-COVERED ITEM OR SERVICE: HCPCS | Performed by: HOSPITALIST

## 2017-07-18 PROCEDURE — 80053 COMPREHEN METABOLIC PANEL: CPT

## 2017-07-18 PROCEDURE — 72146 MRI CHEST SPINE W/O DYE: CPT

## 2017-07-18 PROCEDURE — 700102 HCHG RX REV CODE 250 W/ 637 OVERRIDE(OP): Performed by: HOSPITALIST

## 2017-07-18 PROCEDURE — 700105 HCHG RX REV CODE 258: Performed by: INTERNAL MEDICINE

## 2017-07-18 RX ORDER — LORAZEPAM 2 MG/ML
2 INJECTION INTRAMUSCULAR PRN
Status: DISCONTINUED | OUTPATIENT
Start: 2017-07-18 | End: 2017-07-19

## 2017-07-18 RX ORDER — POTASSIUM CHLORIDE 20 MEQ/1
20 TABLET, EXTENDED RELEASE ORAL DAILY
Status: DISCONTINUED | OUTPATIENT
Start: 2017-07-18 | End: 2017-07-20 | Stop reason: HOSPADM

## 2017-07-18 RX ADMIN — OXYCODONE HYDROCHLORIDE 5 MG: 5 TABLET ORAL at 16:44

## 2017-07-18 RX ADMIN — SODIUM CHLORIDE, POTASSIUM CHLORIDE, SODIUM LACTATE AND CALCIUM CHLORIDE: 600; 310; 30; 20 INJECTION, SOLUTION INTRAVENOUS at 03:48

## 2017-07-18 RX ADMIN — BACLOFEN 10 MG: 10 TABLET ORAL at 20:22

## 2017-07-18 RX ADMIN — OXYCODONE HYDROCHLORIDE 5 MG: 5 TABLET ORAL at 11:00

## 2017-07-18 RX ADMIN — BACLOFEN 10 MG: 10 TABLET ORAL at 08:26

## 2017-07-18 RX ADMIN — BACLOFEN 10 MG: 10 TABLET ORAL at 15:13

## 2017-07-18 RX ADMIN — MORPHINE SULFATE 2 MG: 4 INJECTION INTRAVENOUS at 20:23

## 2017-07-18 RX ADMIN — POTASSIUM CHLORIDE 20 MEQ: 1500 TABLET, EXTENDED RELEASE ORAL at 15:13

## 2017-07-18 RX ADMIN — OXYCODONE HYDROCHLORIDE 5 MG: 5 TABLET ORAL at 22:42

## 2017-07-18 RX ADMIN — LORAZEPAM 2 MG: 2 INJECTION INTRAMUSCULAR; INTRAVENOUS at 18:06

## 2017-07-18 ASSESSMENT — ENCOUNTER SYMPTOMS
FOCAL WEAKNESS: 1
WEAKNESS: 1
BACK PAIN: 1
FEVER: 0
COUGH: 0
ABDOMINAL PAIN: 0
SENSORY CHANGE: 0
HALLUCINATIONS: 0
FLANK PAIN: 0
TREMORS: 0
NAUSEA: 0
HEADACHES: 0
TINGLING: 1
SPEECH CHANGE: 0
NERVOUS/ANXIOUS: 1
SHORTNESS OF BREATH: 0
VOMITING: 0
PALPITATIONS: 0
MYALGIAS: 1
CHILLS: 0

## 2017-07-18 ASSESSMENT — PAIN SCALES - GENERAL
PAINLEVEL_OUTOF10: 0
PAINLEVEL_OUTOF10: 5

## 2017-07-18 ASSESSMENT — LIFESTYLE VARIABLES: SUBSTANCE_ABUSE: 0

## 2017-07-18 ASSESSMENT — ACTIVITIES OF DAILY LIVING (ADL): TOILETING: INDEPENDENT

## 2017-07-18 NOTE — CARE PLAN
Problem: Safety  Goal: Will remain free from falls  Bed alarm on and call light within reach

## 2017-07-18 NOTE — PROGRESS NOTES
[facility] Hospitalist Progress Note    Date of Service: 2017    Chief Complaint  67 y.o. male hx of incomplete paraplegia admitted 2017 with co increased left wrist pain and low back pain after twisting his back w spasms.     Interval Problem Update    Pain controlled w oxycodone , baclofen for spasms.  Claustrophobic with MRI.     Consultants/Specialty  none    Disposition  Tbd         Review of Systems   Constitutional: Negative for fever and chills.   HENT: Negative for congestion.    Respiratory: Negative for cough and shortness of breath.    Cardiovascular: Negative for chest pain and palpitations.   Gastrointestinal: Negative for nausea, vomiting and abdominal pain.   Genitourinary: Negative for hematuria and flank pain.   Musculoskeletal: Positive for myalgias, back pain and joint pain.   Neurological: Positive for tingling, focal weakness and weakness. Negative for tremors, sensory change, speech change and headaches.   Psychiatric/Behavioral: Negative for hallucinations and substance abuse. The patient is nervous/anxious.       Physical Exam  Laboratory/Imaging   Hemodynamics  Temp (24hrs), Av.5 °C (97.7 °F), Min:36.4 °C (97.5 °F), Max:36.8 °C (98.2 °F)   Temperature: 36.4 °C (97.5 °F)  Pulse  Av  Min: 64  Max: 97    Blood Pressure : 128/79 mmHg      Respiratory      Respiration: 16, Pulse Oximetry: 97 %, O2 Daily Delivery Respiratory : Room Air with O2 Available        RUL Breath Sounds: Diminished, RML Breath Sounds: Diminished, RLL Breath Sounds: Coarse Crackles, EMILIANO Breath Sounds: Diminished, LLL Breath Sounds: Coarse Crackles    Fluids    Intake/Output Summary (Last 24 hours) at 17 1418  Last data filed at 17 0849   Gross per 24 hour   Intake   1320 ml   Output    750 ml   Net    570 ml       Nutrition  Orders Placed This Encounter   Procedures   • Diet Order     Standing Status: Standing      Number of Occurrences: 1      Standing Expiration Date:      Order Specific  Question:  Diet:     Answer:  Regular [1]     Physical Exam   Constitutional: He is oriented to person, place, and time. No distress.   Eyes: EOM are normal. No scleral icterus.   Neck: Neck supple.   Cardiovascular: Normal rate and regular rhythm.    No murmur heard.  Pulmonary/Chest: No stridor. He has no wheezes. He has no rales.   Abdominal: Soft. Bowel sounds are normal. He exhibits no distension. There is no tenderness.   Musculoskeletal: He exhibits tenderness. He exhibits no edema.   Neurological: He is alert and oriented to person, place, and time. No cranial nerve deficit. Coordination abnormal.   Rt lower ext no movement  approx 2/5 left lower.  5/5 upper exts.    Skin: Skin is warm and dry. He is not diaphoretic. No erythema.   Bruises of exts   Psychiatric: He has a normal mood and affect. His behavior is normal.       Recent Labs      07/17/17   1315  07/18/17   0432   WBC  3.9*  3.7*   RBC  4.86  4.30*   HEMOGLOBIN  15.6  13.7*   HEMATOCRIT  44.2  39.6*   MCV  90.9  92.1   MCH  32.1  31.9   MCHC  35.3  34.6   RDW  40.5  40.7   PLATELETCT  168  162*   MPV  10.8  10.9     Recent Labs      07/17/17   1315  07/18/17   0432   SODIUM  135  136   POTASSIUM  3.6  3.4*   CHLORIDE  103  106   CO2  15*  17*   GLUCOSE  89  75   BUN  14  14   CREATININE  0.87  0.74   CALCIUM  8.6  8.0*     Recent Labs      07/17/17   1315   APTT  33.7   INR  1.03                  Assessment/Plan     * Wrist pain  Assessment & Plan  Probable strain   Mri eval for Fx   Prn oxycodone.     Back pain  Assessment & Plan  w spasms  cw baclofen scheduled w prn oxycodone, IV MS for severe breakthru pain   Check mri spine- no contrast eval for impingment, fx.-- will pre medicate w IV ativan for severe anxiety     Hypokalemia  Assessment & Plan  Oral kcl replacement     Debility  Assessment & Plan  PT  SW assist w dc planning     Paraplegia ([facility])  Assessment & Plan  Chronic incomplete paraplegia     Chronic pain  Assessment & Plan  cw  analgesics  PT eval, SW assess with dc planning re chronic debility       Labs reviewed, Medications reviewed and Radiology images reviewed  Westbrook catheter: No Westbrook      DVT Prophylaxis: Enoxaparin (Lovenox)

## 2017-07-18 NOTE — CARE PLAN
Problem: Pain Management  Goal: Pain level will decrease to patient’s comfort goal  Pain medications per pt request

## 2017-07-18 NOTE — PROGRESS NOTES
Admit profile and med rec completed. Oriented to room, unit, and routine. Safety precautions in place.

## 2017-07-18 NOTE — PROGRESS NOTES
2 RN skin assessment done; skin WDL, except for tear right elbow and BUE bruising and bruise right buttock.

## 2017-07-18 NOTE — DISCHARGE PLANNING
Medical Social Work    Referral: Pt discussed at IDT rounds this AM.    Intervention: Per flowsheet, pt lives alone, wheelchair bound and expects to d.c home.  PT evaluation to see if safe for d.c or needs to go to rehab.  No SS needs identified nor any requests for HETAL Camargo during rounds.      Plan: SW available for any assistance with d.c planning.    Care Transition Team Assessment    Information Source  Orientation : Oriented x 4  Information Given By: Patient    Readmission Evaluation  Is this a readmission?: No    Elopement Risk  Legal Hold: No  Ambulatory or Self Mobile in Wheelchair: No-Not an Elopement Risk  Elopement Risk: Not at Risk for Elopement    Interdisciplinary Discharge Planning  Does Admitting Nurse Feel This Could be a Complex Discharge?: No  Primary Care Physician: Kaylan MOLINA  Lives with - Patient's Self Care Capacity: Alone and Able to Care For Self  Patient or legal guardian wants to designate a caregiver (see row info): No  Support Systems: Friends / Neighbors  Housing / Facility: 1 Story Apartment / Condo  Do You Take your Prescribed Medications Regularly: Yes  Able to Return to Previous ADL's: Future Time w/Therapy  Mobility Issues: Yes  Prior Services: Housekeeping / Homemaker Services  Patient Expects to be Discharged to:: home  Assistance Needed: Unknown at this Time  Durable Medical Equipment: Other - Specify (wheelchair)    Discharge Preparedness  What is your plan after discharge?: Uncertain - pending medical team collaboration  What are your discharge supports?: Other (comment) (Friend)         Finances  Prescription Coverage: Yes    Vision / Hearing Impairment  Vision Impairment : Yes  Right Eye Vision: Impaired, Wears Glasses  Left Eye Vision: Impaired, Wears Glasses  Hearing Impairment : No    Values / Beliefs / Concerns  Values / Beliefs Concerns : No    Advance Directive  Advance Directive?: None                   Anticipated Discharge Information  Anticipated discharge  disposition: Home

## 2017-07-18 NOTE — PROGRESS NOTES
Home medication bottles (1 Losartan and 1 Ativan) sealed in med bag and given to House Supervisor, Nessa, for safe keeping in after hours pharmacy.

## 2017-07-18 NOTE — THERAPY
PT NOTE: Pt refusing PT eval; wants to wait until has MRI for spine and results are back before mobilizing. Will f/u tomorrow.

## 2017-07-18 NOTE — ASSESSMENT & PLAN NOTE
w Spasms-  Chronic .  MRI  Chronic degenerative changes vs diskitis  --Does not want to repeat MRI with contrast.  He is afebrile - felt unlikely infxn- check blood Cxs  cw baclofen scheduled w prn oxycodone, IV MS for severe breakthru pain

## 2017-07-18 NOTE — PROGRESS NOTES
Report given to NOC nurse, Paula LUNDY. All lines remain patent. Safety precautions remain in place. POC discussed with pt and RN at the bedside.

## 2017-07-18 NOTE — FLOWSHEET NOTE
07/17/17 1837   Events/Summary/Plan   Events/Summary/Plan PAtient states that they don't feel like they need it right now, patient isn't in any distress and no SOB noted   Therapy Not Performed   Type of Therapy Not Performed SVN   Reason Therapy Not Performed Refused   Respiratory WDL   Respiratory (WDL) X   Chest Exam   Respiration 16   Pulse 84   Breath Sounds   RUL Breath Sounds Clear   RML Breath Sounds Clear   RLL Breath Sounds Diminished   EMILIANO Breath Sounds Clear   LLL Breath Sounds Diminished   Secretions   Cough Dry;Non Productive   How Sputum Obtained Spontaneous   Oximetry   #Pulse Oximetry (Single Determination) Yes   Oxygen   Home O2 Use Prior To Admission? No   Pulse Oximetry 98 %   O2 (LPM) 0   O2 (FiO2) 21   O2 Daily Delivery Respiratory  Room Air with O2 Available   Room Air Challenge Pass

## 2017-07-19 ENCOUNTER — APPOINTMENT (OUTPATIENT)
Dept: RADIOLOGY | Facility: MEDICAL CENTER | Age: 67
DRG: 563 | End: 2017-07-19
Attending: HOSPITALIST
Payer: MEDICARE

## 2017-07-19 LAB
ANION GAP SERPL CALC-SCNC: 11 MMOL/L (ref 0–11.9)
BASOPHILS # BLD AUTO: 0.5 % (ref 0–1.8)
BASOPHILS # BLD: 0.02 K/UL (ref 0–0.12)
BUN SERPL-MCNC: 10 MG/DL (ref 8–22)
CALCIUM SERPL-MCNC: 8.3 MG/DL (ref 8.4–10.2)
CHLORIDE SERPL-SCNC: 106 MMOL/L (ref 96–112)
CO2 SERPL-SCNC: 19 MMOL/L (ref 20–33)
CREAT SERPL-MCNC: 0.82 MG/DL (ref 0.5–1.4)
EOSINOPHIL # BLD AUTO: 0.21 K/UL (ref 0–0.51)
EOSINOPHIL NFR BLD: 4.9 % (ref 0–6.9)
ERYTHROCYTE [DISTWIDTH] IN BLOOD BY AUTOMATED COUNT: 40.1 FL (ref 35.9–50)
GFR SERPL CREATININE-BSD FRML MDRD: >60 ML/MIN/1.73 M 2
GLUCOSE SERPL-MCNC: 83 MG/DL (ref 65–99)
HCT VFR BLD AUTO: 39.2 % (ref 42–52)
HGB BLD-MCNC: 13.8 G/DL (ref 14–18)
IMM GRANULOCYTES # BLD AUTO: 0.01 K/UL (ref 0–0.11)
IMM GRANULOCYTES NFR BLD AUTO: 0.2 % (ref 0–0.9)
LYMPHOCYTES # BLD AUTO: 1.01 K/UL (ref 1–4.8)
LYMPHOCYTES NFR BLD: 23.7 % (ref 22–41)
MCH RBC QN AUTO: 32.2 PG (ref 27–33)
MCHC RBC AUTO-ENTMCNC: 35.2 G/DL (ref 33.7–35.3)
MCV RBC AUTO: 91.6 FL (ref 81.4–97.8)
MONOCYTES # BLD AUTO: 0.42 K/UL (ref 0–0.85)
MONOCYTES NFR BLD AUTO: 9.9 % (ref 0–13.4)
NEUTROPHILS # BLD AUTO: 2.59 K/UL (ref 1.82–7.42)
NEUTROPHILS NFR BLD: 60.8 % (ref 44–72)
NRBC # BLD AUTO: 0 K/UL
NRBC BLD AUTO-RTO: 0 /100 WBC
PLATELET # BLD AUTO: 172 K/UL (ref 164–446)
PMV BLD AUTO: 10.7 FL (ref 9–12.9)
POTASSIUM SERPL-SCNC: 3.8 MMOL/L (ref 3.6–5.5)
RBC # BLD AUTO: 4.28 M/UL (ref 4.7–6.1)
SODIUM SERPL-SCNC: 136 MMOL/L (ref 135–145)
WBC # BLD AUTO: 4.3 K/UL (ref 4.8–10.8)

## 2017-07-19 PROCEDURE — 80048 BASIC METABOLIC PNL TOTAL CA: CPT

## 2017-07-19 PROCEDURE — 700111 HCHG RX REV CODE 636 W/ 250 OVERRIDE (IP): Performed by: HOSPITALIST

## 2017-07-19 PROCEDURE — 770006 HCHG ROOM/CARE - MED/SURG/GYN SEMI*

## 2017-07-19 PROCEDURE — 99232 SBSQ HOSP IP/OBS MODERATE 35: CPT | Performed by: HOSPITALIST

## 2017-07-19 PROCEDURE — 85025 COMPLETE CBC W/AUTO DIFF WBC: CPT

## 2017-07-19 PROCEDURE — 73221 MRI JOINT UPR EXTREM W/O DYE: CPT | Mod: LT

## 2017-07-19 PROCEDURE — 700102 HCHG RX REV CODE 250 W/ 637 OVERRIDE(OP): Performed by: HOSPITALIST

## 2017-07-19 PROCEDURE — 700102 HCHG RX REV CODE 250 W/ 637 OVERRIDE(OP): Performed by: INTERNAL MEDICINE

## 2017-07-19 PROCEDURE — A9270 NON-COVERED ITEM OR SERVICE: HCPCS | Performed by: INTERNAL MEDICINE

## 2017-07-19 PROCEDURE — 87040 BLOOD CULTURE FOR BACTERIA: CPT

## 2017-07-19 PROCEDURE — A9270 NON-COVERED ITEM OR SERVICE: HCPCS | Performed by: HOSPITALIST

## 2017-07-19 RX ORDER — DIAZEPAM 5 MG/ML
5 INJECTION, SOLUTION INTRAMUSCULAR; INTRAVENOUS EVERY 6 HOURS PRN
Status: DISCONTINUED | OUTPATIENT
Start: 2017-07-19 | End: 2017-07-20 | Stop reason: HOSPADM

## 2017-07-19 RX ADMIN — OXYCODONE HYDROCHLORIDE 5 MG: 5 TABLET ORAL at 10:14

## 2017-07-19 RX ADMIN — BACLOFEN 10 MG: 10 TABLET ORAL at 08:57

## 2017-07-19 RX ADMIN — POTASSIUM CHLORIDE 20 MEQ: 1500 TABLET, EXTENDED RELEASE ORAL at 08:57

## 2017-07-19 RX ADMIN — OXYCODONE HYDROCHLORIDE 5 MG: 5 TABLET ORAL at 23:02

## 2017-07-19 RX ADMIN — OXYCODONE HYDROCHLORIDE 5 MG: 5 TABLET ORAL at 20:09

## 2017-07-19 RX ADMIN — DIAZEPAM 5 MG: 5 INJECTION, SOLUTION INTRAMUSCULAR; INTRAVENOUS at 11:09

## 2017-07-19 RX ADMIN — OXYCODONE HYDROCHLORIDE 5 MG: 5 TABLET ORAL at 15:39

## 2017-07-19 ASSESSMENT — ENCOUNTER SYMPTOMS
NERVOUS/ANXIOUS: 1
FOCAL WEAKNESS: 1
TREMORS: 0
SHORTNESS OF BREATH: 0
COUGH: 0
VOMITING: 0
BACK PAIN: 1
CHILLS: 0
TINGLING: 1
SENSORY CHANGE: 0
HALLUCINATIONS: 0
SPEECH CHANGE: 0
ABDOMINAL PAIN: 0
WEAKNESS: 1
PALPITATIONS: 0
FLANK PAIN: 0
HEADACHES: 0
MYALGIAS: 1
NAUSEA: 0
FEVER: 0

## 2017-07-19 ASSESSMENT — PAIN SCALES - GENERAL
PAINLEVEL_OUTOF10: 7
PAINLEVEL_OUTOF10: 7
PAINLEVEL_OUTOF10: 3
PAINLEVEL_OUTOF10: 7
PAINLEVEL_OUTOF10: 7

## 2017-07-19 ASSESSMENT — LIFESTYLE VARIABLES: SUBSTANCE_ABUSE: 0

## 2017-07-19 NOTE — THERAPY
PT NOTE: Pt still has not spoken with MD to review MRI results, refusing to participate in PT eval until then. Will f/u tomorrow.

## 2017-07-19 NOTE — CARE PLAN
Problem: Venous Thromboembolism (VTW)/Deep Vein Thrombosis (DVT) Prevention:  Goal: Patient will participate in Venous Thrombosis (VTE)/Deep Vein Thrombosis (DVT)Prevention Measures  Outcome: PROGRESSING SLOWER THAN EXPECTED  Pt educated on DVT/VTE prophylaxis (SCD's and Lovenox). ROM exercises encouraged w/assistance from clinical staff as necessary.        Problem: Psychosocial Needs:  Goal: Level of anxiety will decrease  Outcome: PROGRESSING AS EXPECTED  One-on-one discussion. Identify triggers for anxiety. Encourage pt to verbalize feelings of anxiety. Administer medications prn as ordered.

## 2017-07-19 NOTE — PROGRESS NOTES
"Assessment completed--see doc flowsheet. A&Ox4. Meds provided. Encouraged to reposition and shift weight often. Pt reports left wrist \"feels better\"; splint in place. POC discussed, verbalized understanding. Call light and personal possessions within reach, bed in low position, encouraged to call for assistance.  "

## 2017-07-19 NOTE — DIETARY
"Nutrition services consult for failure to thrive.  67 yr old male with poor oral intake admit trigger.  Admit diagnosis of wrist pain, debility, and back pain.   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    Ht:  6'4\"  Wt: 90.7 kg (200 lbs)   UBW:  195-200 lbs  Patient presents within his usual body wt range.  Patient states he had not been eating at home prior to admit because he was not able to get out of bed to get his food to eat.  PO intake averaging 25-50% at this time.  Food preferences discussed. Patient would like to try a milkshake with lunch and dinner.  He refuses boost type product at this time.  Plan/Recommendation:  Honor food preferences, add milkshake at lunch and dinner, RD to monitor po intake progress.     "

## 2017-07-19 NOTE — DISCHARGE PLANNING
Medical Social Work    Referral: Pt discussed at IDT rounds this AM.    Intervention: Per flowsheet, pt lives alone and wheelchair bound and expects to d.c home.  PT to evaluate today for safe d.c planning.  No SS needs identified nor any requests for HETAL Camargo during rounds.      Plan: SW available for any assistance with d.c planning.

## 2017-07-19 NOTE — PROGRESS NOTES
Bedside report received from NOC nurseHelen RN. Assumed care. Pt resting in bed. Safety precautions in place.

## 2017-07-20 ENCOUNTER — HOME HEALTH ADMISSION (OUTPATIENT)
Dept: HOME HEALTH SERVICES | Facility: HOME HEALTHCARE | Age: 67
End: 2017-07-20
Payer: MEDICARE

## 2017-07-20 VITALS
TEMPERATURE: 97 F | RESPIRATION RATE: 18 BRPM | SYSTOLIC BLOOD PRESSURE: 141 MMHG | BODY MASS INDEX: 24.36 KG/M2 | DIASTOLIC BLOOD PRESSURE: 89 MMHG | HEART RATE: 88 BPM | HEIGHT: 76 IN | OXYGEN SATURATION: 97 % | WEIGHT: 200 LBS

## 2017-07-20 LAB
BASOPHILS # BLD AUTO: 0.6 % (ref 0–1.8)
BASOPHILS # BLD: 0.02 K/UL (ref 0–0.12)
EOSINOPHIL # BLD AUTO: 0.15 K/UL (ref 0–0.51)
EOSINOPHIL NFR BLD: 4.6 % (ref 0–6.9)
ERYTHROCYTE [DISTWIDTH] IN BLOOD BY AUTOMATED COUNT: 38.8 FL (ref 35.9–50)
HCT VFR BLD AUTO: 36.8 % (ref 42–52)
HGB BLD-MCNC: 12.9 G/DL (ref 14–18)
IMM GRANULOCYTES # BLD AUTO: 0.01 K/UL (ref 0–0.11)
IMM GRANULOCYTES NFR BLD AUTO: 0.3 % (ref 0–0.9)
LYMPHOCYTES # BLD AUTO: 0.72 K/UL (ref 1–4.8)
LYMPHOCYTES NFR BLD: 22 % (ref 22–41)
MCH RBC QN AUTO: 31.1 PG (ref 27–33)
MCHC RBC AUTO-ENTMCNC: 35.1 G/DL (ref 33.7–35.3)
MCV RBC AUTO: 88.7 FL (ref 81.4–97.8)
MONOCYTES # BLD AUTO: 0.34 K/UL (ref 0–0.85)
MONOCYTES NFR BLD AUTO: 10.4 % (ref 0–13.4)
NEUTROPHILS # BLD AUTO: 2.04 K/UL (ref 1.82–7.42)
NEUTROPHILS NFR BLD: 62.1 % (ref 44–72)
NRBC # BLD AUTO: 0 K/UL
NRBC BLD AUTO-RTO: 0 /100 WBC
PLATELET # BLD AUTO: 162 K/UL (ref 164–446)
PMV BLD AUTO: 11.1 FL (ref 9–12.9)
RBC # BLD AUTO: 4.15 M/UL (ref 4.7–6.1)
WBC # BLD AUTO: 3.3 K/UL (ref 4.8–10.8)

## 2017-07-20 PROCEDURE — 97535 SELF CARE MNGMENT TRAINING: CPT

## 2017-07-20 PROCEDURE — 700102 HCHG RX REV CODE 250 W/ 637 OVERRIDE(OP): Performed by: HOSPITALIST

## 2017-07-20 PROCEDURE — 99239 HOSP IP/OBS DSCHRG MGMT >30: CPT | Performed by: HOSPITALIST

## 2017-07-20 PROCEDURE — A9270 NON-COVERED ITEM OR SERVICE: HCPCS | Performed by: HOSPITALIST

## 2017-07-20 PROCEDURE — G8979 MOBILITY GOAL STATUS: HCPCS | Mod: CL

## 2017-07-20 PROCEDURE — G8978 MOBILITY CURRENT STATUS: HCPCS | Mod: CM

## 2017-07-20 PROCEDURE — 97162 PT EVAL MOD COMPLEX 30 MIN: CPT

## 2017-07-20 PROCEDURE — 700102 HCHG RX REV CODE 250 W/ 637 OVERRIDE(OP): Performed by: INTERNAL MEDICINE

## 2017-07-20 PROCEDURE — 85025 COMPLETE CBC W/AUTO DIFF WBC: CPT

## 2017-07-20 PROCEDURE — A9270 NON-COVERED ITEM OR SERVICE: HCPCS | Performed by: INTERNAL MEDICINE

## 2017-07-20 RX ORDER — OXYCODONE HYDROCHLORIDE 5 MG/1
5 TABLET ORAL EVERY 6 HOURS PRN
Qty: 20 TAB | Refills: 0 | Status: SHIPPED | OUTPATIENT
Start: 2017-07-20 | End: 2019-09-05

## 2017-07-20 RX ORDER — BACLOFEN 10 MG/1
10 TABLET ORAL EVERY 8 HOURS PRN
Qty: 20 TAB | Refills: 0 | Status: ON HOLD | OUTPATIENT
Start: 2017-07-20 | End: 2017-08-25

## 2017-07-20 RX ADMIN — POTASSIUM CHLORIDE 20 MEQ: 1500 TABLET, EXTENDED RELEASE ORAL at 10:28

## 2017-07-20 RX ADMIN — OXYCODONE HYDROCHLORIDE 5 MG: 5 TABLET ORAL at 10:39

## 2017-07-20 ASSESSMENT — PAIN SCALES - GENERAL
PAINLEVEL_OUTOF10: 4
PAINLEVEL_OUTOF10: 7

## 2017-07-20 ASSESSMENT — GAIT ASSESSMENTS: GAIT LEVEL OF ASSIST: UNABLE TO PARTICIPATE

## 2017-07-20 ASSESSMENT — ACTIVITIES OF DAILY LIVING (ADL): TOILETING: INDEPENDENT

## 2017-07-20 NOTE — PROGRESS NOTES
[facility] Hospitalist Progress Note    Date of Service: 2017    Chief Complaint  67 y.o. male hx of incomplete paraplegia admitted 2017 with co increased left wrist pain and low back pain after twisting his back w spasms.     Interval Problem Update    Refused PT this morning, now more receptive. Reports back pain, improved , sciatica symptoms left buttock.    MRI findings of Possible discitis osteomyelitis T6-7 versus acute on chronic degenerative process. Postcontrast enhanced MRI sequences of the thoracic spine are recommended for further evaluation.    Gracile appearance of the thoracic cord with an ovoid focus of myelomalacia at T8-9 consistent with the provided history of remote spinal cord injury. Multilevel degenerative changes of the thoracic spine as described above.    Consultants/Specialty  none    Disposition  Tbd         Review of Systems   Constitutional: Negative for fever and chills.   HENT: Negative for congestion.    Respiratory: Negative for cough and shortness of breath.    Cardiovascular: Negative for chest pain and palpitations.   Gastrointestinal: Negative for nausea, vomiting and abdominal pain.   Genitourinary: Negative for hematuria and flank pain.   Musculoskeletal: Positive for myalgias, back pain and joint pain.   Neurological: Positive for tingling, focal weakness and weakness. Negative for tremors, sensory change, speech change and headaches.   Psychiatric/Behavioral: Negative for hallucinations and substance abuse. The patient is nervous/anxious.       Physical Exam  Laboratory/Imaging   Hemodynamics  Temp (24hrs), Av.7 °C (98 °F), Min:36.4 °C (97.6 °F), Max:36.9 °C (98.4 °F)   Temperature: 36.4 °C (97.6 °F)  Pulse  Av.9  Min: 64  Max: 99    Blood Pressure : 111/62 mmHg      Respiratory      Respiration: 18, Pulse Oximetry: 98 %             Fluids    Intake/Output Summary (Last 24 hours) at 17 8254  Last data filed at 17 1500   Gross per 24 hour   Intake     360 ml   Output    550 ml   Net   -190 ml       Nutrition  Orders Placed This Encounter   Procedures   • Diet Order     Standing Status: Standing      Number of Occurrences: 1      Standing Expiration Date:      Order Specific Question:  Diet:     Answer:  Regular [1]     Physical Exam   Constitutional: He is oriented to person, place, and time. No distress.   Eyes: EOM are normal. No scleral icterus.   Neck: Neck supple.   Cardiovascular: Normal rate and regular rhythm.    No murmur heard.  Pulmonary/Chest: No stridor. He has no wheezes. He has no rales.   Abdominal: Soft. Bowel sounds are normal. He exhibits no distension. There is no tenderness.   Musculoskeletal: He exhibits tenderness. He exhibits no edema.   Neurological: He is alert and oriented to person, place, and time. No cranial nerve deficit. Coordination abnormal.   Rt lower ext no movement  approx 2/5 left lower.  5/5 upper exts.    Skin: Skin is warm and dry. He is not diaphoretic. No erythema.   Bruises of exts   Psychiatric: He has a normal mood and affect. His behavior is normal.       Recent Labs      07/17/17   1315  07/18/17   0432  07/19/17   0448   WBC  3.9*  3.7*  4.3*   RBC  4.86  4.30*  4.28*   HEMOGLOBIN  15.6  13.7*  13.8*   HEMATOCRIT  44.2  39.6*  39.2*   MCV  90.9  92.1  91.6   MCH  32.1  31.9  32.2   MCHC  35.3  34.6  35.2   RDW  40.5  40.7  40.1   PLATELETCT  168  162*  172   MPV  10.8  10.9  10.7     Recent Labs      07/17/17   1315  07/18/17   0432  07/19/17   0448   SODIUM  135  136  136   POTASSIUM  3.6  3.4*  3.8   CHLORIDE  103  106  106   CO2  15*  17*  19*   GLUCOSE  89  75  83   BUN  14  14  10   CREATININE  0.87  0.74  0.82   CALCIUM  8.6  8.0*  8.3*     Recent Labs      07/17/17   1315   APTT  33.7   INR  1.03                  Assessment/Plan     * Wrist pain  Assessment & Plan  Probable strain w Degenerative changes.   Prn oxycodone.   Wrist splint.     Back pain  Assessment & Plan  w Spasms-  Chronic .  MRI  Chronic  degenerative changes vs diskitis  --Does not want to repeat MRI with contrast.  He is afebrile - felt unlikely infxn- check blood Cxs  cw baclofen scheduled w prn oxycodone, IV MS for severe breakthru pain       Hypokalemia  Assessment & Plan  Corrected   Oral kcl replacement     Debility  Assessment & Plan  Encourage work with PT  SW assist w dc planning     Paraplegia (CMS-[facility])  Assessment & Plan  Chronic incomplete paraplegia     Chronic pain  Assessment & Plan  sched baclofen, prn oxycodone, IV ms for breakthru pain   cw analgesics      Labs reviewed, Medications reviewed and Radiology images reviewed  Westbrook catheter: No Westbrook      DVT Prophylaxis: Enoxaparin (Lovenox)

## 2017-07-20 NOTE — FACE TO FACE
Face to Face Supporting Documentation - Home Health    The encounter with this patient was in whole or in part the primary reason for home health admission.    Date of encounter:   Patient:                    MRN:                       YOB: 2015  Bernardino Hawthorne  [id number]  1950     Home health to see patient for:  Skilled Nursing care for assessment, interventions & education, Medical social work consult, Home health aide, Physical Therapy evaluation and treatment and Occupational therapy evaluation and treatment    Skilled need for:  Exacerbation of Chronic Disease State chronic back pain , debility w paraplegia     Skilled nursing interventions to include:  Comment: PT, OT RN    Homebound status evidenced by:  Need the aid of supportive devices such as crutches, canes, wheelchairs or walkers, Require the use of special transportation or Needs the assistance of another person in order to leave the home. Leaving home requires a considerable and taxing effort. There is a normal inability to leave the home.    Community Physician to provide follow up care: Annmarie Terrell M.D.     Optional Interventions? No      I certify the face to face encounter for this home health care referral meets the CMS requirements and the encounter/clinical assessment with the patient was, in whole, or in part, for the medical condition(s) listed above, which is the primary reason for home health care. Based on my clinical findings: the service(s) are medically necessary, support the need for home health care, and the homebound criteria are met.  I certify that this patient has had a face to face encounter by myself.  Lázaro Alvarez M.D. - NPI: [id number]

## 2017-07-20 NOTE — CARE PLAN
Problem: Safety  Goal: Will remain free from falls  Intervention: Implement fall precautions  Room/floor clear. Non skid socks on. Proper signs up. Bed alarm on, bed low and locked. Call light and belonging within reach. Hourly rounding in place to make sure needs are met.        Problem: Infection  Goal: Will remain free from infection  Intervention: Implement standard precautions and perform hand washing before and after patient contact  Hand washing every encounter. IV ports scrubbed with alcohol when hanging medicine. Patient watch for s/s of infection. Patient taught to report s/s of infection, verbalized understanding.        Problem: Respiratory:  Goal: Respiratory status will improve  Intervention: Educate and encourage coughing and deep breathing  Encouraged to perform coughing and deep breathing, verbalized understanding.

## 2017-07-20 NOTE — PROGRESS NOTES
Assessment completed--see doc flowsheet. A&Ox4. Meds provided. PT eval completed. BM today. POC discussed, verbalized understanding. Call light and personal possessions within reach, bed in low position, encouraged to call for assistance.
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
## 2017-07-20 NOTE — CARE PLAN
Problem: Pain Management  Goal: Pain level will decrease to patient’s comfort goal  Outcome: PROGRESSING AS EXPECTED  Pt encouraged to verbalize experiencing pain. 0-10 pain scale explained, verbalized understanding. Administer prn pain meds as ordered.    Problem: Psychosocial Needs:  Goal: Level of anxiety will decrease  Outcome: PROGRESSING AS EXPECTED  One-on-one discussion. Identify triggers for anxiety. Encourage pt to verbalize feelings of anxiety. Administer medications prn as ordered.

## 2017-07-20 NOTE — THERAPY
"Occupational Therapy Education only completed.   Functional Status:  Pt was up with PT this morning and able to demo squat pivot transfer to bedside commode.  Pt had some difficulty with transfer back to bed, but he believes it was due to different furniture arrangement for him.  Pt has been living independently at home using w/c for mobility for many years.  Pt slightly weaker now from bed rest, but feeling better with wrist and back pain at this time.  Pt denied need for formal OT eval at this time, but agreeable to education.  Pt educated extensively on use of tub transfer bench for safety with bathing, and resources for w/c repair, reacher, etc.  Given handout with contact info for many resources.  SW in, and aware of need for tub transfer bench for safety with bathing.  She will ask MD for order and submit it to DME company.  Pt willing to consider paying out of pocket if bench is denied by insurance provided that the price is not overly inflated.  Pt expressed gratitude for the education given and was able to identify a friend/neighbor that he could call if he has trouble at home before home health services are initiated.  Plan of Care: Patient with no further skilled OT needs in the acute care setting at this time  Discharge Recommendations:  Equipment: Tub Transfer Bench and bedrail, possible hand held shower and reacher, and w/c lift device for his van.. Post-acute therapy Discharge to home with outpatient or home health for additional skilled therapy services.    See \"Rehab Therapy-Acute\" Patient Summary Report for complete documentation.    "

## 2017-07-20 NOTE — DISCHARGE INSTRUCTIONS
Discharge Instructions    Discharged to home by taxi with self. Discharged via wheelchair, hospital escort: Yes.  Special equipment needed: Wheelchair    Be sure to schedule a follow-up appointment with your primary care doctor or any specialists as instructed.     Discharge Plan:   Diet Plan: Discussed  Activity Level: Discussed  Confirmed Follow up Appointment: Appointment Scheduled  Confirmed Symptoms Management: Discussed  Medication Reconciliation Updated: Yes  Influenza Vaccine Indication: Patient Refuses    I understand that a diet low in cholesterol, fat, and sodium is recommended for good health. Unless I have been given specific instructions below for another diet, I accept this instruction as my diet prescription.   Other diet: as tolerated    Special Instructions: None    · Is patient discharged on Warfarin / Coumadin?   No     · Is patient Post Blood Transfusion?  No  Wrist Pain  Wrist injuries are frequent in adults and children. A sprain is an injury to the ligaments that hold your bones together. A strain is an injury to muscle or muscle cord-like structures (tendons) from stretching or pulling. Generally, when wrists are moderately tender to touch following a fall or injury, a break in the bone (fracture) may be present. Most wrist sprains or strains are better in 3 to 5 days, but complete healing may take several weeks.  HOME CARE INSTRUCTIONS   · Put ice on the injured area.  · Put ice in a plastic bag.  · Place a towel between your skin and the bag.  · Leave the ice on for 15-20 minutes, 3-4 times a day, for the first 2 days, or as directed by your health care provider.  · Keep your arm raised above the level of your heart whenever possible to reduce swelling and pain.  · Rest the injured area for at least 48 hours or as directed by your health care provider.  · If a splint or elastic bandage has been applied, use it for as long as directed by your health care provider or until seen by a health  care provider for a follow-up exam.  · Only take over-the-counter or prescription medicines for pain, discomfort, or fever as directed by your health care provider.  · Keep all follow-up appointments. You may need to follow up with a specialist or have follow-up X-rays. Improvement in pain level is not a guarantee that you did not fracture a bone in your wrist. The only way to determine whether or not you have a broken bone is by X-ray.  SEEK IMMEDIATE MEDICAL CARE IF:   · Your fingers are swollen, very red, white, or cold and blue.  · Your fingers are numb or tingling.  · You have increasing pain.  · You have difficulty moving your fingers.  MAKE SURE YOU:   · Understand these instructions.  · Will watch your condition.  · Will get help right away if you are not doing well or get worse.     This information is not intended to replace advice given to you by your health care provider. Make sure you discuss any questions you have with your health care provider.     Document Released: 09/27/2006 Document Revised: 12/23/2014 Document Reviewed: 02/08/2012  [employer] Patient Education ©2016 [employer]  Back Pain, Adult  Back pain is very common in adults. The cause of back pain is rarely dangerous and the pain often gets better over time. The cause of your back pain may not be known. Some common causes of back pain include:  · Strain of the muscles or ligaments supporting the spine.  · Wear and tear (degeneration) of the spinal disks.  · Arthritis.  · Direct injury to the back.  For many people, back pain may return. Since back pain is rarely dangerous, most people can learn to manage this condition on their own.  HOME CARE INSTRUCTIONS  Watch your back pain for any changes. The following actions may help to lessen any discomfort you are feeling:  · Remain active. It is stressful on your back to sit or  one place for long periods of time. Do not sit, drive, or  one place for more than 30  minutes at a time. Take short walks on even surfaces as soon as you are able. Try to increase the length of time you walk each day.  · Exercise regularly as directed by your health care provider. Exercise helps your back heal faster. It also helps avoid future injury by keeping your muscles strong and flexible.  · Do not stay in bed. Resting more than 1-2 days can delay your recovery.  · Pay attention to your body when you bend and lift. The most comfortable positions are those that put less stress on your recovering back. Always use proper lifting techniques, including:  ¨ Bending your knees.  ¨ Keeping the load close to your body.  ¨ Avoiding twisting.  · Find a comfortable position to sleep. Use a firm mattress and lie on your side with your knees slightly bent. If you lie on your back, put a pillow under your knees.  · Avoid feeling anxious or stressed. Stress increases muscle tension and can worsen back pain. It is important to recognize when you are anxious or stressed and learn ways to manage it, such as with exercise.  · Take medicines only as directed by your health care provider. Over-the-counter medicines to reduce pain and inflammation are often the most helpful. Your health care provider may prescribe muscle relaxant drugs. These medicines help dull your pain so you can more quickly return to your normal activities and healthy exercise.  · Apply ice to the injured area:  ¨ Put ice in a plastic bag.  ¨ Place a towel between your skin and the bag.  ¨ Leave the ice on for 20 minutes, 2-3 times a day for the first 2-3 days. After that, ice and heat may be alternated to reduce pain and spasms.  · Maintain a healthy weight. Excess weight puts extra stress on your back and makes it difficult to maintain good posture.  SEEK MEDICAL CARE IF:  · You have pain that is not relieved with rest or medicine.  · You have increasing pain going down into the legs or buttocks.  · You have pain that does not improve in one  week.  · You have night pain.  · You lose weight.  · You have a fever or chills.  SEEK IMMEDIATE MEDICAL CARE IF:   · You develop new bowel or bladder control problems.  · You have unusual weakness or numbness in your arms or legs.  · You develop nausea or vomiting.  · You develop abdominal pain.  · You feel faint.     This information is not intended to replace advice given to you by your health care provider. Make sure you discuss any questions you have with your health care provider.     Document Released: 12/18/2006 Document Revised: 01/08/2016 Document Reviewed: 04/21/2015  [employer] Interactive Patient Education ©2016 [employer]      Depression / Suicide Risk    As you are discharged from this [facility] facility, it is important to learn how to keep safe from harming yourself.    Recognize the warning signs:  · Abrupt changes in personality, positive or negative- including increase in energy   · Giving away possessions  · Change in eating patterns- significant weight changes-  positive or negative  · Change in sleeping patterns- unable to sleep or sleeping all the time   · Unwillingness or inability to communicate  · Depression  · Unusual sadness, discouragement and loneliness  · Talk of wanting to die  · Neglect of personal appearance   · Rebelliousness- reckless behavior  · Withdrawal from people/activities they love  · Confusion- inability to concentrate     If you or a loved one observes any of these behaviors or has concerns about self-harm, here's what you can do:  · Talk about it- your feelings and reasons for harming yourself  · Remove any means that you might use to hurt yourself (examples: pills, rope, extension cords, firearm)  · Get professional help from the community (Mental Health, Substance Abuse, psychological counseling)  · Do not be alone:Call your Safe Contact- someone whom you trust who will be there for you.  · Call your local CRISIS HOTLINE 646-5423 or 290-379-9621  · Call your  local Children's Mobile Crisis Response Team [place] (851) 676-1798 or www.[place].NiteTables  · Call the toll free National Suicide Prevention Hotlines   · National Suicide Prevention Lifeline 388-837-XIUP (4367)  · National Hope Line Network 800-SUICIDE (949-6575)

## 2017-07-20 NOTE — DISCHARGE PLANNING
HETAL noted new order for HH placed for this patient.  HETAL met with this patient at bedside and completed choice form for [facility].  HETAL was also informed by this patient and the OT that this patient will need a tube transfer bench at home.  HETAL spoke with Hospitalist who will complete order.  HETAL faxed both choice forms to Lisa Berntsein for follow up.

## 2017-07-20 NOTE — PROGRESS NOTES
Report given to NOC nurse, Helen LUNDY. Pt remains calm and cooperative. All lines remain patent. Safety precautions remain in place. POC discussed with pt and RN at the bedside.

## 2017-07-21 ENCOUNTER — PATIENT OUTREACH (OUTPATIENT)
Dept: HEALTH INFORMATION MANAGEMENT | Facility: OTHER | Age: 67
End: 2017-07-21
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
## 2017-07-22 ENCOUNTER — HOSPITAL ENCOUNTER (OUTPATIENT)
Facility: MEDICAL CENTER | Age: 67
End: 2017-07-25
Attending: EMERGENCY MEDICINE | Admitting: INTERNAL MEDICINE
Payer: MEDICARE

## 2017-07-22 ENCOUNTER — HOME CARE VISIT (OUTPATIENT)
Dept: HOME HEALTH SERVICES | Facility: HOME HEALTHCARE | Age: 67
End: 2017-07-22

## 2017-07-22 ENCOUNTER — RESOLUTE PROFESSIONAL BILLING HOSPITAL PROF FEE (OUTPATIENT)
Dept: HOSPITALIST | Facility: MEDICAL CENTER | Age: 67
End: 2017-07-22
Payer: MEDICARE

## 2017-07-22 ENCOUNTER — PATIENT OUTREACH (OUTPATIENT)
Dept: HEALTH INFORMATION MANAGEMENT | Facility: OTHER | Age: 67
End: 2017-07-22

## 2017-07-22 DIAGNOSIS — Z71.89 ENCOUNTER FOR HOME SAFETY REVIEW FOR INJURY PREVENTION: ICD-10-CM

## 2017-07-22 DIAGNOSIS — G82.20 PARAPLEGIA (HCC): ICD-10-CM

## 2017-07-22 DIAGNOSIS — E87.20 METABOLIC ACIDOSIS: ICD-10-CM

## 2017-07-22 PROBLEM — D72.819 LEUKOPENIA: Status: ACTIVE | Noted: 2017-07-22

## 2017-07-22 LAB
ANION GAP SERPL CALC-SCNC: 17 MMOL/L (ref 0–11.9)
BASOPHILS # BLD AUTO: 0.5 % (ref 0–1.8)
BASOPHILS # BLD: 0.02 K/UL (ref 0–0.12)
BUN SERPL-MCNC: 18 MG/DL (ref 8–22)
CALCIUM SERPL-MCNC: 8.9 MG/DL (ref 8.4–10.2)
CHLORIDE SERPL-SCNC: 104 MMOL/L (ref 96–112)
CO2 SERPL-SCNC: 15 MMOL/L (ref 20–33)
CREAT SERPL-MCNC: 0.84 MG/DL (ref 0.5–1.4)
EOSINOPHIL # BLD AUTO: 0.08 K/UL (ref 0–0.51)
EOSINOPHIL NFR BLD: 1.8 % (ref 0–6.9)
ERYTHROCYTE [DISTWIDTH] IN BLOOD BY AUTOMATED COUNT: 39.8 FL (ref 35.9–50)
GFR SERPL CREATININE-BSD FRML MDRD: >60 ML/MIN/1.73 M 2
GLUCOSE SERPL-MCNC: 89 MG/DL (ref 65–99)
HCT VFR BLD AUTO: 40.9 % (ref 42–52)
HGB BLD-MCNC: 14.3 G/DL (ref 14–18)
IMM GRANULOCYTES # BLD AUTO: 0.01 K/UL (ref 0–0.11)
IMM GRANULOCYTES NFR BLD AUTO: 0.2 % (ref 0–0.9)
LACTATE BLD-SCNC: 2.03 MMOL/L (ref 0.5–2)
LYMPHOCYTES # BLD AUTO: 0.64 K/UL (ref 1–4.8)
LYMPHOCYTES NFR BLD: 14.5 % (ref 22–41)
MCH RBC QN AUTO: 31.6 PG (ref 27–33)
MCHC RBC AUTO-ENTMCNC: 35 G/DL (ref 33.7–35.3)
MCV RBC AUTO: 90.5 FL (ref 81.4–97.8)
MONOCYTES # BLD AUTO: 0.35 K/UL (ref 0–0.85)
MONOCYTES NFR BLD AUTO: 8 % (ref 0–13.4)
NEUTROPHILS # BLD AUTO: 3.3 K/UL (ref 1.82–7.42)
NEUTROPHILS NFR BLD: 75 % (ref 44–72)
NRBC # BLD AUTO: 0 K/UL
NRBC BLD AUTO-RTO: 0 /100 WBC
PLATELET # BLD AUTO: 205 K/UL (ref 164–446)
PMV BLD AUTO: 11.5 FL (ref 9–12.9)
POTASSIUM SERPL-SCNC: 3.7 MMOL/L (ref 3.6–5.5)
RBC # BLD AUTO: 4.52 M/UL (ref 4.7–6.1)
SODIUM SERPL-SCNC: 136 MMOL/L (ref 135–145)
SPECIMEN DRAWN FROM PATIENT: ABNORMAL
WBC # BLD AUTO: 4.4 K/UL (ref 4.8–10.8)

## 2017-07-22 PROCEDURE — 700105 HCHG RX REV CODE 258: Performed by: EMERGENCY MEDICINE

## 2017-07-22 PROCEDURE — 36415 COLL VENOUS BLD VENIPUNCTURE: CPT

## 2017-07-22 PROCEDURE — 700105 HCHG RX REV CODE 258: Performed by: INTERNAL MEDICINE

## 2017-07-22 PROCEDURE — 99220 PR INITIAL OBSERVATION CARE,LEVL III: CPT | Performed by: INTERNAL MEDICINE

## 2017-07-22 PROCEDURE — 96360 HYDRATION IV INFUSION INIT: CPT

## 2017-07-22 PROCEDURE — 700102 HCHG RX REV CODE 250 W/ 637 OVERRIDE(OP): Performed by: INTERNAL MEDICINE

## 2017-07-22 PROCEDURE — 85025 COMPLETE CBC W/AUTO DIFF WBC: CPT

## 2017-07-22 PROCEDURE — 80048 BASIC METABOLIC PNL TOTAL CA: CPT

## 2017-07-22 PROCEDURE — 99285 EMERGENCY DEPT VISIT HI MDM: CPT

## 2017-07-22 PROCEDURE — 83605 ASSAY OF LACTIC ACID: CPT

## 2017-07-22 PROCEDURE — G0378 HOSPITAL OBSERVATION PER HR: HCPCS

## 2017-07-22 PROCEDURE — A9270 NON-COVERED ITEM OR SERVICE: HCPCS | Performed by: INTERNAL MEDICINE

## 2017-07-22 RX ORDER — LORAZEPAM 1 MG/1
1 TABLET ORAL
Status: DISCONTINUED | OUTPATIENT
Start: 2017-07-22 | End: 2017-07-25

## 2017-07-22 RX ORDER — SODIUM CHLORIDE 9 MG/ML
INJECTION, SOLUTION INTRAVENOUS CONTINUOUS
Status: DISCONTINUED | OUTPATIENT
Start: 2017-07-22 | End: 2017-07-25 | Stop reason: HOSPADM

## 2017-07-22 RX ORDER — BACLOFEN 10 MG/1
10 TABLET ORAL EVERY 8 HOURS PRN
Status: DISCONTINUED | OUTPATIENT
Start: 2017-07-22 | End: 2017-07-25

## 2017-07-22 RX ORDER — BISACODYL 10 MG
10 SUPPOSITORY, RECTAL RECTAL
Status: DISCONTINUED | OUTPATIENT
Start: 2017-07-22 | End: 2017-07-25

## 2017-07-22 RX ORDER — ONDANSETRON 4 MG/1
4 TABLET, ORALLY DISINTEGRATING ORAL EVERY 4 HOURS PRN
Status: DISCONTINUED | OUTPATIENT
Start: 2017-07-22 | End: 2017-07-25

## 2017-07-22 RX ORDER — OXYCODONE HYDROCHLORIDE 5 MG/1
5 TABLET ORAL EVERY 6 HOURS PRN
Status: DISCONTINUED | OUTPATIENT
Start: 2017-07-22 | End: 2017-07-25

## 2017-07-22 RX ORDER — ACETAMINOPHEN 325 MG/1
650 TABLET ORAL EVERY 4 HOURS PRN
Status: DISCONTINUED | OUTPATIENT
Start: 2017-07-22 | End: 2017-07-25

## 2017-07-22 RX ORDER — ONDANSETRON 2 MG/ML
4 INJECTION INTRAMUSCULAR; INTRAVENOUS EVERY 4 HOURS PRN
Status: DISCONTINUED | OUTPATIENT
Start: 2017-07-22 | End: 2017-07-25 | Stop reason: HOSPADM

## 2017-07-22 RX ORDER — SODIUM CHLORIDE 9 MG/ML
1000 INJECTION, SOLUTION INTRAVENOUS ONCE
Status: COMPLETED | OUTPATIENT
Start: 2017-07-22 | End: 2017-07-22

## 2017-07-22 RX ORDER — POLYETHYLENE GLYCOL 3350 17 G/17G
1 POWDER, FOR SOLUTION ORAL
Status: DISCONTINUED | OUTPATIENT
Start: 2017-07-22 | End: 2017-07-25

## 2017-07-22 RX ORDER — AMOXICILLIN 250 MG
2 CAPSULE ORAL 2 TIMES DAILY
Status: DISCONTINUED | OUTPATIENT
Start: 2017-07-22 | End: 2017-07-25

## 2017-07-22 RX ADMIN — OXYCODONE HYDROCHLORIDE 5 MG: 5 TABLET ORAL at 17:05

## 2017-07-22 RX ADMIN — SODIUM CHLORIDE 1000 ML: 9 INJECTION, SOLUTION INTRAVENOUS at 14:57

## 2017-07-22 RX ADMIN — OXYCODONE HYDROCHLORIDE 5 MG: 5 TABLET ORAL at 23:18

## 2017-07-22 RX ADMIN — SODIUM CHLORIDE: 9 INJECTION, SOLUTION INTRAVENOUS at 17:05

## 2017-07-22 RX ADMIN — BACLOFEN 10 MG: 10 TABLET ORAL at 18:29

## 2017-07-22 ASSESSMENT — ENCOUNTER SYMPTOMS
FOCAL WEAKNESS: 0
DEPRESSION: 0
COUGH: 0
ABDOMINAL PAIN: 0
HEADACHES: 0
CONSTIPATION: 1
FEVER: 0
VOMITING: 0
BACK PAIN: 0
PALPITATIONS: 0
SHORTNESS OF BREATH: 0
SORE THROAT: 0
MYALGIAS: 0
HALLUCINATIONS: 0
FALLS: 1
WEAKNESS: 1
DIARRHEA: 0
HEARTBURN: 0
DIZZINESS: 0
NAUSEA: 0
BLOOD IN STOOL: 0
CHILLS: 0

## 2017-07-22 ASSESSMENT — LIFESTYLE VARIABLES
HAVE PEOPLE ANNOYED YOU BY CRITICIZING YOUR DRINKING: NO
TOTAL SCORE: 0
TOTAL SCORE: 0
ON A TYPICAL DAY WHEN YOU DRINK ALCOHOL HOW MANY DRINKS DO YOU HAVE: 3
CONSUMPTION TOTAL: NEGATIVE
HAVE YOU EVER FELT YOU SHOULD CUT DOWN ON YOUR DRINKING: NO
HOW MANY TIMES IN THE PAST YEAR HAVE YOU HAD 5 OR MORE DRINKS IN A DAY: 0
AVERAGE NUMBER OF DAYS PER WEEK YOU HAVE A DRINK CONTAINING ALCOHOL: 3
TOTAL SCORE: 0
EVER_SMOKED: NEVER
ALCOHOL_USE: YES
EVER FELT BAD OR GUILTY ABOUT YOUR DRINKING: NO
EVER HAD A DRINK FIRST THING IN THE MORNING TO STEADY YOUR NERVES TO GET RID OF A HANGOVER: NO

## 2017-07-22 ASSESSMENT — PAIN SCALES - GENERAL
PAINLEVEL_OUTOF10: 8
PAINLEVEL_OUTOF10: 6
PAINLEVEL_OUTOF10: 6
PAINLEVEL_OUTOF10: 4

## 2017-07-22 NOTE — ED NOTES
Chief Complaint   Patient presents with   • Failure to Thrive   • Weakness   • Loss of Appetite     Pt padmini betancourt for above complaint. Per remsa pt home health nurse encouraged pt to come be evaluated and possibly transfer to rehab facility. Per report pt states he was eating/drinking while in the hospital and when he discharge on Thursday he stopped eating.

## 2017-07-22 NOTE — ED NOTES
The Medication Reconciliation process has been completed by interviewing the patient who has been unable to obtain his prescriptions from his recent discharge.     Allergies have been reviewed  Antibiotic use in 30 days - none    Home Pharmacy:  [facility] [place]
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
## 2017-07-22 NOTE — ED NOTES
Patient is cooperative, understands his MD is trying to get him admitted for rehab strengthening.
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
## 2017-07-22 NOTE — PROGRESS NOTES
Admitted into room 211-1 from ER via gurney alert and oriented x4,transfered into bed via slider board,left arm on a splint.Tolerable pain verbalized.Discussed POC,oriented to room and call light verbalized understanding.Instructed to call for any needs call light with in reach.

## 2017-07-23 LAB
ANION GAP SERPL CALC-SCNC: 9 MMOL/L (ref 0–11.9)
BASOPHILS # BLD AUTO: 0.6 % (ref 0–1.8)
BASOPHILS # BLD: 0.02 K/UL (ref 0–0.12)
BUN SERPL-MCNC: 14 MG/DL (ref 8–22)
CALCIUM SERPL-MCNC: 7.9 MG/DL (ref 8.4–10.2)
CHLORIDE SERPL-SCNC: 108 MMOL/L (ref 96–112)
CO2 SERPL-SCNC: 19 MMOL/L (ref 20–33)
CREAT SERPL-MCNC: 0.77 MG/DL (ref 0.5–1.4)
EOSINOPHIL # BLD AUTO: 0.18 K/UL (ref 0–0.51)
EOSINOPHIL NFR BLD: 5.7 % (ref 0–6.9)
ERYTHROCYTE [DISTWIDTH] IN BLOOD BY AUTOMATED COUNT: 39.9 FL (ref 35.9–50)
GFR SERPL CREATININE-BSD FRML MDRD: >60 ML/MIN/1.73 M 2
GLUCOSE SERPL-MCNC: 95 MG/DL (ref 65–99)
HCT VFR BLD AUTO: 34.5 % (ref 42–52)
HGB BLD-MCNC: 12 G/DL (ref 14–18)
IMM GRANULOCYTES # BLD AUTO: 0.02 K/UL (ref 0–0.11)
IMM GRANULOCYTES NFR BLD AUTO: 0.6 % (ref 0–0.9)
LYMPHOCYTES # BLD AUTO: 0.67 K/UL (ref 1–4.8)
LYMPHOCYTES NFR BLD: 21.1 % (ref 22–41)
MCH RBC QN AUTO: 31.7 PG (ref 27–33)
MCHC RBC AUTO-ENTMCNC: 34.8 G/DL (ref 33.7–35.3)
MCV RBC AUTO: 91 FL (ref 81.4–97.8)
MONOCYTES # BLD AUTO: 0.34 K/UL (ref 0–0.85)
MONOCYTES NFR BLD AUTO: 10.7 % (ref 0–13.4)
NEUTROPHILS # BLD AUTO: 1.95 K/UL (ref 1.82–7.42)
NEUTROPHILS NFR BLD: 61.3 % (ref 44–72)
NRBC # BLD AUTO: 0 K/UL
NRBC BLD AUTO-RTO: 0 /100 WBC
PLATELET # BLD AUTO: 162 K/UL (ref 164–446)
PMV BLD AUTO: 11.5 FL (ref 9–12.9)
POTASSIUM SERPL-SCNC: 3.3 MMOL/L (ref 3.6–5.5)
RBC # BLD AUTO: 3.79 M/UL (ref 4.7–6.1)
SODIUM SERPL-SCNC: 136 MMOL/L (ref 135–145)
WBC # BLD AUTO: 3.2 K/UL (ref 4.8–10.8)

## 2017-07-23 PROCEDURE — 85025 COMPLETE CBC W/AUTO DIFF WBC: CPT

## 2017-07-23 PROCEDURE — A9270 NON-COVERED ITEM OR SERVICE: HCPCS | Performed by: INTERNAL MEDICINE

## 2017-07-23 PROCEDURE — 99226 PR SUBSEQUENT OBSERVATION CARE,LEVEL III: CPT | Performed by: INTERNAL MEDICINE

## 2017-07-23 PROCEDURE — G0378 HOSPITAL OBSERVATION PER HR: HCPCS

## 2017-07-23 PROCEDURE — G8979 MOBILITY GOAL STATUS: HCPCS | Mod: CK

## 2017-07-23 PROCEDURE — 700102 HCHG RX REV CODE 250 W/ 637 OVERRIDE(OP): Performed by: INTERNAL MEDICINE

## 2017-07-23 PROCEDURE — G8978 MOBILITY CURRENT STATUS: HCPCS | Mod: CL

## 2017-07-23 PROCEDURE — 80048 BASIC METABOLIC PNL TOTAL CA: CPT

## 2017-07-23 PROCEDURE — G8987 SELF CARE CURRENT STATUS: HCPCS | Mod: CL

## 2017-07-23 PROCEDURE — 97165 OT EVAL LOW COMPLEX 30 MIN: CPT | Mod: XE

## 2017-07-23 PROCEDURE — 97162 PT EVAL MOD COMPLEX 30 MIN: CPT

## 2017-07-23 PROCEDURE — 36415 COLL VENOUS BLD VENIPUNCTURE: CPT

## 2017-07-23 PROCEDURE — G8988 SELF CARE GOAL STATUS: HCPCS | Mod: CK

## 2017-07-23 PROCEDURE — 700105 HCHG RX REV CODE 258: Performed by: INTERNAL MEDICINE

## 2017-07-23 RX ORDER — POTASSIUM CHLORIDE 20 MEQ/1
40 TABLET, EXTENDED RELEASE ORAL ONCE
Status: COMPLETED | OUTPATIENT
Start: 2017-07-23 | End: 2017-07-23

## 2017-07-23 RX ADMIN — BACLOFEN 10 MG: 10 TABLET ORAL at 02:23

## 2017-07-23 RX ADMIN — LORAZEPAM 1 MG: 1 TABLET ORAL at 23:05

## 2017-07-23 RX ADMIN — BACLOFEN 10 MG: 10 TABLET ORAL at 23:06

## 2017-07-23 RX ADMIN — OXYCODONE HYDROCHLORIDE 5 MG: 5 TABLET ORAL at 14:24

## 2017-07-23 RX ADMIN — SODIUM CHLORIDE: 9 INJECTION, SOLUTION INTRAVENOUS at 02:22

## 2017-07-23 RX ADMIN — POTASSIUM CHLORIDE 40 MEQ: 1500 TABLET, EXTENDED RELEASE ORAL at 15:23

## 2017-07-23 RX ADMIN — SODIUM CHLORIDE: 9 INJECTION, SOLUTION INTRAVENOUS at 21:42

## 2017-07-23 RX ADMIN — OXYCODONE HYDROCHLORIDE 5 MG: 5 TABLET ORAL at 21:42

## 2017-07-23 RX ADMIN — SODIUM CHLORIDE: 9 INJECTION, SOLUTION INTRAVENOUS at 15:24

## 2017-07-23 RX ADMIN — BACLOFEN 10 MG: 10 TABLET ORAL at 14:24

## 2017-07-23 RX ADMIN — OXYCODONE HYDROCHLORIDE 5 MG: 5 TABLET ORAL at 08:19

## 2017-07-23 ASSESSMENT — ENCOUNTER SYMPTOMS
RESPIRATORY NEGATIVE: 1
CARDIOVASCULAR NEGATIVE: 1
PSYCHIATRIC NEGATIVE: 1
EYES NEGATIVE: 1
CONSTITUTIONAL NEGATIVE: 1
FOCAL WEAKNESS: 1
GASTROINTESTINAL NEGATIVE: 1

## 2017-07-23 ASSESSMENT — GAIT ASSESSMENTS: GAIT LEVEL OF ASSIST: UNABLE TO PARTICIPATE

## 2017-07-23 ASSESSMENT — PAIN SCALES - GENERAL
PAINLEVEL_OUTOF10: 6
PAINLEVEL_OUTOF10: 4
PAINLEVEL_OUTOF10: 5
PAINLEVEL_OUTOF10: 4

## 2017-07-23 ASSESSMENT — ACTIVITIES OF DAILY LIVING (ADL): TOILETING: INDEPENDENT

## 2017-07-23 NOTE — THERAPY
"Occupational Therapy Evaluation completed.   Functional Status: A&Ox4. Motivated to work with OT. Sup><sit with CGA. Seated activity, ~10\" with SBA. L wrist splint in place. Seated grooming with SBA. Generalized weakness. Transferred to/from w/c about an hour ago with PT; see PT eval. UE dressing with Sebastian, LE dressing with ModA. Nrsg reports BM earlier; MaxA with hygiene.      Plan of Care: Will benefit from Occupational Therapy 3 times per week  Discharge Recommendations:  Equipment: Will Continue to Assess for Equipment Needs. Post-acute therapy Discharge to a transitional care facility for continued skilled therapy services.  Pt will benefit from an intensive inpt rehab stay.  Pt lives alone and is motivated to increase strength,balance,independence. Unsafe for return home at this time; fall risk.   See \"Rehab Therapy-Acute\" Patient Summary Report for complete documentation.    "
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
## 2017-07-23 NOTE — ASSESSMENT & PLAN NOTE
- Control with PRN oxycodone  - Recent MRI/CT spine showed no acute fracture or pathology requiring surgical intervention  - Baclofen 10 TID

## 2017-07-23 NOTE — ASSESSMENT & PLAN NOTE
- unable to care for himself at home upon recent discharge with [facility]  - Rehab referral placed; awaiting assessment

## 2017-07-23 NOTE — ASSESSMENT & PLAN NOTE
- with osteoarthritic changes on imaging  - cont with prn tylenol and oxy for pain control  - splint in place  - await rehab assessment; PT/OT

## 2017-07-23 NOTE — DISCHARGE PLANNING
Care Transition Team Assessment    Information Source  Orientation : Oriented x 4  Information Given By: Patient  Who is responsible for making decisions for patient? : Patient    Readmission Evaluation  Is this a readmission?: Yes - unplanned readmission  Why do you think you were readmitted?: debility and weakness    Elopement Risk  Legal Hold: No  Ambulatory or Self Mobile in Wheelchair: No-Not an Elopement Risk  Elopement Risk: Not at Risk for Elopement    Interdisciplinary Discharge Planning  Does Admitting Nurse Feel This Could be a Complex Discharge?: Yes  Lives with - Patient's Self Care Capacity: Alone and Able to Care For Self  Patient or legal guardian wants to designate a caregiver (see row info): No  Support Systems: Friends / Neighbors  Housing / Facility: 1 Story Apartment / Condo  Do You Take your Prescribed Medications Regularly: Yes  Able to Return to Previous ADL's: Future Time w/Therapy  Mobility Issues: Yes  Patient Expects to be Discharged to:: home  Assistance Needed: Unknown at this Time  Durable Medical Equipment: Other - Specify (wheelchair)    Discharge Preparedness  What is your plan after discharge?: Home with help, Home health care         Finances  Financial Barriers to Discharge: No  Prescription Coverage: Yes    Vision / Hearing Impairment  Vision Impairment : Yes  Right Eye Vision: Impaired, Wears Glasses  Left Eye Vision: Impaired, Wears Glasses  Hearing Impairment : No         Advance Directive  Advance Directive?: None    Domestic Abuse  Have you ever been the victim of abuse or violence?: No    Psychological Assessment  History of Substance Abuse: None         Anticipated Discharge Information  Anticipated discharge disposition: Acute rehab, SNF
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
## 2017-07-23 NOTE — PROGRESS NOTES
Report received from day RN, assumed pt care at 1900, pt assessment completed, denies any further needs at this time, updated on POC for pain management and q 2 hour turns to be continued, will continue rounding, bed in lowest/locked position, bed alarm on, call light and belongings within reach.

## 2017-07-23 NOTE — PROGRESS NOTES
Tolerated breakfast,ate 50%.Refused Lovenox and Senna despite explanation of its importance and benefits.C/o pain on his left hand verbalizing it is more swollen this morning,splint is off overnight,encouraged to use splint.Encouraged to shift her position q 2 hrs and as often-verbalized understanding.Instructed to call for any needs call light with in reach.

## 2017-07-23 NOTE — CARE PLAN
Problem: Skin Integrity  Goal: Risk for impaired skin integrity will decrease  Intervention: Implement precautions to protect skin integrity in collaboration with the interdisciplinary team  Waffle cushion on the bed,continue to turn q 2 hrs,float heels off the bed

## 2017-07-23 NOTE — DISCHARGE PLANNING
Patient discussed in morning rounds.  Patient is awaiting OT eval to be considered for Acute Rehab.  Patient lives alone and has HH in place in past.  SW will be available to assist as discharge plan comes together.

## 2017-07-23 NOTE — CARE PLAN
Problem: Skin Integrity  Goal: Risk for impaired skin integrity will decrease  Intervention: Assess risk factors for impaired skin integrity and/or pressure ulcers  Patient needs assistance of 2 for mobility  Intervention: Implement precautions to protect skin integrity in collaboration with the interdisciplinary team  Nutrition consult has been placed  q 2 hour turns continued  Barrier cream to sacrum

## 2017-07-24 LAB
ANION GAP SERPL CALC-SCNC: 6 MMOL/L (ref 0–11.9)
BACTERIA BLD CULT: NORMAL
BACTERIA BLD CULT: NORMAL
BUN SERPL-MCNC: 9 MG/DL (ref 8–22)
CALCIUM SERPL-MCNC: 8 MG/DL (ref 8.4–10.2)
CHLORIDE SERPL-SCNC: 109 MMOL/L (ref 96–112)
CO2 SERPL-SCNC: 20 MMOL/L (ref 20–33)
CREAT SERPL-MCNC: 0.68 MG/DL (ref 0.5–1.4)
GFR SERPL CREATININE-BSD FRML MDRD: >60 ML/MIN/1.73 M 2
GLUCOSE SERPL-MCNC: 98 MG/DL (ref 65–99)
POTASSIUM SERPL-SCNC: 3.4 MMOL/L (ref 3.6–5.5)
SIGNIFICANT IND 70042: NORMAL
SIGNIFICANT IND 70042: NORMAL
SITE SITE: NORMAL
SITE SITE: NORMAL
SODIUM SERPL-SCNC: 135 MMOL/L (ref 135–145)
SOURCE SOURCE: NORMAL
SOURCE SOURCE: NORMAL

## 2017-07-24 PROCEDURE — G0378 HOSPITAL OBSERVATION PER HR: HCPCS

## 2017-07-24 PROCEDURE — 36415 COLL VENOUS BLD VENIPUNCTURE: CPT

## 2017-07-24 PROCEDURE — 700102 HCHG RX REV CODE 250 W/ 637 OVERRIDE(OP): Performed by: INTERNAL MEDICINE

## 2017-07-24 PROCEDURE — A9270 NON-COVERED ITEM OR SERVICE: HCPCS | Performed by: INTERNAL MEDICINE

## 2017-07-24 PROCEDURE — 700105 HCHG RX REV CODE 258: Performed by: INTERNAL MEDICINE

## 2017-07-24 PROCEDURE — 80048 BASIC METABOLIC PNL TOTAL CA: CPT

## 2017-07-24 PROCEDURE — 99225 PR SUBSEQUENT OBSERVATION CARE,LEVEL II: CPT | Performed by: INTERNAL MEDICINE

## 2017-07-24 RX ORDER — POTASSIUM CHLORIDE 20 MEQ/1
40 TABLET, EXTENDED RELEASE ORAL DAILY
Status: DISCONTINUED | OUTPATIENT
Start: 2017-07-24 | End: 2017-07-25

## 2017-07-24 RX ADMIN — POTASSIUM CHLORIDE 40 MEQ: 1500 TABLET, EXTENDED RELEASE ORAL at 17:33

## 2017-07-24 RX ADMIN — BACLOFEN 10 MG: 10 TABLET ORAL at 20:46

## 2017-07-24 RX ADMIN — LORAZEPAM 1 MG: 1 TABLET ORAL at 20:46

## 2017-07-24 RX ADMIN — SODIUM CHLORIDE: 9 INJECTION, SOLUTION INTRAVENOUS at 17:34

## 2017-07-24 RX ADMIN — OXYCODONE HYDROCHLORIDE 5 MG: 5 TABLET ORAL at 22:04

## 2017-07-24 ASSESSMENT — ENCOUNTER SYMPTOMS
GASTROINTESTINAL NEGATIVE: 1
EYES NEGATIVE: 1
FOCAL WEAKNESS: 1
CARDIOVASCULAR NEGATIVE: 1
PSYCHIATRIC NEGATIVE: 1
RESPIRATORY NEGATIVE: 1
CONSTITUTIONAL NEGATIVE: 1

## 2017-07-24 ASSESSMENT — PAIN SCALES - GENERAL
PAINLEVEL_OUTOF10: 5
PAINLEVEL_OUTOF10: 5

## 2017-07-24 NOTE — CARE PLAN
Problem: Discharge Barriers/Planning  Goal: Patient’s continuum of care needs will be met  Intervention: Collaborate with Transitional Care Team and Interdisciplinary Team to meet discharge needs  awaiting Acute rehab.eval in candidate for IR  Possible SNF of HH,needs personal care giver for help at home      Problem: Mobility  Goal: Risk for activity intolerance will decrease  Intervention: Encourage patient to increase activity level in collaboration with Interdisciplinary Team  Continue to encourage to participate in care  Up on his wheelchair at least BID,encourage arm exercises  Trapeze requested to increase mobility in bed

## 2017-07-24 NOTE — DISCHARGE PLANNING
Patient discussed in morning rounds.  SW called and left message with Selene at Rehab that both PT and OT has seen this patient and their reports/notes are now in.  SW will wait to here back from Acute Rehab.  No other SS needs noted at this time.
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
## 2017-07-24 NOTE — CARE PLAN
Problem: Safety  Goal: Will remain free from injury  Outcome: PROGRESSING AS EXPECTED  Call light and personal belongings within reach. Bed in lowest position. Bed rails up x2. Hourly rounding. Treaded slippers in place.     Problem: Skin Integrity  Goal: Risk for impaired skin integrity will decrease  Outcome: PROGRESSING AS EXPECTED  Q2 turns. Skin assessments. Skin care provided.

## 2017-07-24 NOTE — PROGRESS NOTES
Report received from Marlo LUNDY. Assumed care of pt. Pt resting in bed. Pt A&Ox4. POC discussed. Pt verbalized understanding. No signs of distress or discomfort noted at this time. Pt instructed to use call light for assistance. Call light and personal belongings within reach. Pt denies any concerns at this time. All questions answered. Safety measures in place. Will continue to monitor.

## 2017-07-24 NOTE — DISCHARGE PLANNING
Thank you for the opportunity to assist with this patient as they transition to post acute services.  We are aware of the Rehab referral from Dr. Stone.  Dr. Golden to consult this referral. Our Transitional Case Coordinator will follow. At this time patient is showing to have SCP as their coverage.   Please do not hesitate to call us if you require additional assistance my phone number is [phone] [place].
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
## 2017-07-25 ENCOUNTER — RESOLUTE PROFESSIONAL BILLING HOSPITAL PROF FEE (OUTPATIENT)
Dept: PHYSICAL MEDICINE AND REHAB | Facility: REHABILITATION | Age: 67
End: 2017-07-25
Payer: MEDICARE

## 2017-07-25 ENCOUNTER — HOSPITAL ENCOUNTER (INPATIENT)
Facility: REHABILITATION | Age: 67
LOS: 32 days | DRG: 052 | End: 2017-08-26
Attending: PHYSICAL MEDICINE & REHABILITATION | Admitting: PHYSICAL MEDICINE & REHABILITATION
Payer: MEDICARE

## 2017-07-25 VITALS
SYSTOLIC BLOOD PRESSURE: 145 MMHG | HEIGHT: 76 IN | HEART RATE: 76 BPM | WEIGHT: 200 LBS | OXYGEN SATURATION: 99 % | RESPIRATION RATE: 18 BRPM | BODY MASS INDEX: 24.36 KG/M2 | TEMPERATURE: 98.6 F | DIASTOLIC BLOOD PRESSURE: 78 MMHG

## 2017-07-25 PROBLEM — R62.51 FAILURE TO THRIVE (0-17): Status: ACTIVE | Noted: 2017-07-25

## 2017-07-25 LAB
ANION GAP SERPL CALC-SCNC: 6 MMOL/L (ref 0–11.9)
BUN SERPL-MCNC: 6 MG/DL (ref 8–22)
CALCIUM SERPL-MCNC: 8.1 MG/DL (ref 8.4–10.2)
CHLORIDE SERPL-SCNC: 109 MMOL/L (ref 96–112)
CO2 SERPL-SCNC: 19 MMOL/L (ref 20–33)
CREAT SERPL-MCNC: 0.65 MG/DL (ref 0.5–1.4)
GFR SERPL CREATININE-BSD FRML MDRD: >60 ML/MIN/1.73 M 2
GLUCOSE SERPL-MCNC: 92 MG/DL (ref 65–99)
POTASSIUM SERPL-SCNC: 3.6 MMOL/L (ref 3.6–5.5)
SODIUM SERPL-SCNC: 134 MMOL/L (ref 135–145)

## 2017-07-25 PROCEDURE — 770010 HCHG ROOM/CARE - REHAB SEMI PRIVAT*

## 2017-07-25 PROCEDURE — 700102 HCHG RX REV CODE 250 W/ 637 OVERRIDE(OP): Performed by: INTERNAL MEDICINE

## 2017-07-25 PROCEDURE — A9270 NON-COVERED ITEM OR SERVICE: HCPCS | Performed by: INTERNAL MEDICINE

## 2017-07-25 PROCEDURE — 700102 HCHG RX REV CODE 250 W/ 637 OVERRIDE(OP): Performed by: PHYSICAL MEDICINE & REHABILITATION

## 2017-07-25 PROCEDURE — G0378 HOSPITAL OBSERVATION PER HR: HCPCS

## 2017-07-25 PROCEDURE — 700111 HCHG RX REV CODE 636 W/ 250 OVERRIDE (IP): Performed by: INTERNAL MEDICINE

## 2017-07-25 PROCEDURE — A9270 NON-COVERED ITEM OR SERVICE: HCPCS | Performed by: PHYSICAL MEDICINE & REHABILITATION

## 2017-07-25 PROCEDURE — 36415 COLL VENOUS BLD VENIPUNCTURE: CPT

## 2017-07-25 PROCEDURE — 700105 HCHG RX REV CODE 258: Performed by: INTERNAL MEDICINE

## 2017-07-25 PROCEDURE — 99217 PR OBSERVATION CARE DISCHARGE: CPT | Performed by: INTERNAL MEDICINE

## 2017-07-25 PROCEDURE — 80048 BASIC METABOLIC PNL TOTAL CA: CPT

## 2017-07-25 RX ORDER — AMOXICILLIN 250 MG
2 CAPSULE ORAL 2 TIMES DAILY
Status: DISCONTINUED | OUTPATIENT
Start: 2017-07-25 | End: 2017-07-26

## 2017-07-25 RX ORDER — ACETAMINOPHEN 325 MG/1
650 TABLET ORAL EVERY 4 HOURS PRN
Status: DISCONTINUED | OUTPATIENT
Start: 2017-07-25 | End: 2017-08-17

## 2017-07-25 RX ORDER — POTASSIUM CHLORIDE 20 MEQ/1
40 TABLET, EXTENDED RELEASE ORAL DAILY
Status: CANCELLED | OUTPATIENT
Start: 2017-07-26

## 2017-07-25 RX ORDER — LORAZEPAM 1 MG/1
1 TABLET ORAL
Status: DISCONTINUED | OUTPATIENT
Start: 2017-07-25 | End: 2017-08-26 | Stop reason: HOSPADM

## 2017-07-25 RX ORDER — LORAZEPAM 1 MG/1
1 TABLET ORAL
Status: CANCELLED | OUTPATIENT
Start: 2017-07-25

## 2017-07-25 RX ORDER — POLYVINYL ALCOHOL 14 MG/ML
1 SOLUTION/ DROPS OPHTHALMIC PRN
Status: DISCONTINUED | OUTPATIENT
Start: 2017-07-25 | End: 2017-08-26 | Stop reason: HOSPADM

## 2017-07-25 RX ORDER — ECHINACEA PURPUREA EXTRACT 125 MG
2 TABLET ORAL PRN
Status: DISCONTINUED | OUTPATIENT
Start: 2017-07-25 | End: 2017-08-26 | Stop reason: HOSPADM

## 2017-07-25 RX ORDER — ONDANSETRON 4 MG/1
4 TABLET, ORALLY DISINTEGRATING ORAL EVERY 4 HOURS PRN
Status: DISCONTINUED | OUTPATIENT
Start: 2017-07-25 | End: 2017-08-26 | Stop reason: HOSPADM

## 2017-07-25 RX ORDER — OXYCODONE HYDROCHLORIDE 5 MG/1
5 TABLET ORAL EVERY 6 HOURS PRN
Status: DISCONTINUED | OUTPATIENT
Start: 2017-07-25 | End: 2017-07-27

## 2017-07-25 RX ORDER — BISACODYL 10 MG
10 SUPPOSITORY, RECTAL RECTAL
Status: DISCONTINUED | OUTPATIENT
Start: 2017-07-25 | End: 2017-07-25

## 2017-07-25 RX ORDER — ONDANSETRON 4 MG/1
4 TABLET, ORALLY DISINTEGRATING ORAL EVERY 4 HOURS PRN
Status: CANCELLED | OUTPATIENT
Start: 2017-07-25

## 2017-07-25 RX ORDER — BISACODYL 10 MG
10 SUPPOSITORY, RECTAL RECTAL
Status: CANCELLED | OUTPATIENT
Start: 2017-07-25

## 2017-07-25 RX ORDER — POLYETHYLENE GLYCOL 3350 17 G/17G
1 POWDER, FOR SOLUTION ORAL
Status: DISCONTINUED | OUTPATIENT
Start: 2017-07-25 | End: 2017-07-26

## 2017-07-25 RX ORDER — DOCUSATE SODIUM 100 MG/1
100 CAPSULE, LIQUID FILLED ORAL DAILY
Status: DISCONTINUED | OUTPATIENT
Start: 2017-07-25 | End: 2017-07-26

## 2017-07-25 RX ORDER — LACTULOSE 20 G/30ML
30 SOLUTION ORAL
Status: DISCONTINUED | OUTPATIENT
Start: 2017-07-25 | End: 2017-07-26

## 2017-07-25 RX ORDER — AMOXICILLIN 250 MG
2 CAPSULE ORAL 2 TIMES DAILY
Status: CANCELLED | OUTPATIENT
Start: 2017-07-25

## 2017-07-25 RX ORDER — TRAMADOL HYDROCHLORIDE 50 MG/1
50 TABLET ORAL EVERY 4 HOURS PRN
Status: DISCONTINUED | OUTPATIENT
Start: 2017-07-25 | End: 2017-08-26 | Stop reason: HOSPADM

## 2017-07-25 RX ORDER — POTASSIUM CHLORIDE 20 MEQ/1
40 TABLET, EXTENDED RELEASE ORAL DAILY
Status: DISCONTINUED | OUTPATIENT
Start: 2017-07-26 | End: 2017-07-26

## 2017-07-25 RX ORDER — OXYCODONE HYDROCHLORIDE 5 MG/1
5 TABLET ORAL EVERY 6 HOURS PRN
Status: CANCELLED | OUTPATIENT
Start: 2017-07-25

## 2017-07-25 RX ORDER — ALUMINA, MAGNESIA, AND SIMETHICONE 2400; 2400; 240 MG/30ML; MG/30ML; MG/30ML
20 SUSPENSION ORAL
Status: DISCONTINUED | OUTPATIENT
Start: 2017-07-25 | End: 2017-08-26 | Stop reason: HOSPADM

## 2017-07-25 RX ORDER — BACLOFEN 10 MG/1
10 TABLET ORAL EVERY 8 HOURS PRN
Status: DISCONTINUED | OUTPATIENT
Start: 2017-07-25 | End: 2017-07-26

## 2017-07-25 RX ORDER — AMOXICILLIN 250 MG
1 CAPSULE ORAL EVERY EVENING
Status: DISCONTINUED | OUTPATIENT
Start: 2017-07-25 | End: 2017-07-25

## 2017-07-25 RX ORDER — TRAZODONE HYDROCHLORIDE 50 MG/1
50 TABLET ORAL
Status: DISCONTINUED | OUTPATIENT
Start: 2017-07-25 | End: 2017-08-26 | Stop reason: HOSPADM

## 2017-07-25 RX ORDER — POLYETHYLENE GLYCOL 3350 17 G/17G
1 POWDER, FOR SOLUTION ORAL
Status: CANCELLED | OUTPATIENT
Start: 2017-07-25

## 2017-07-25 RX ORDER — BISACODYL 10 MG
10 SUPPOSITORY, RECTAL RECTAL
Status: DISCONTINUED | OUTPATIENT
Start: 2017-07-25 | End: 2017-07-26

## 2017-07-25 RX ORDER — ACETAMINOPHEN 325 MG/1
650 TABLET ORAL EVERY 4 HOURS PRN
Status: CANCELLED | OUTPATIENT
Start: 2017-07-25

## 2017-07-25 RX ORDER — BACLOFEN 10 MG/1
10 TABLET ORAL EVERY 8 HOURS PRN
Status: CANCELLED | OUTPATIENT
Start: 2017-07-25

## 2017-07-25 RX ADMIN — LORAZEPAM 1 MG: 1 TABLET ORAL at 21:17

## 2017-07-25 RX ADMIN — DOCUSATE SODIUM AND SENNOSIDES 2 TABLET: 8.6; 5 TABLET, FILM COATED ORAL at 09:30

## 2017-07-25 RX ADMIN — SODIUM CHLORIDE: 9 INJECTION, SOLUTION INTRAVENOUS at 02:06

## 2017-07-25 RX ADMIN — OXYCODONE HYDROCHLORIDE 5 MG: 5 TABLET ORAL at 21:32

## 2017-07-25 RX ADMIN — POTASSIUM CHLORIDE 40 MEQ: 1500 TABLET, EXTENDED RELEASE ORAL at 09:30

## 2017-07-25 ASSESSMENT — LIFESTYLE VARIABLES
ALCOHOL_USE: YES
EVER HAD A DRINK FIRST THING IN THE MORNING TO STEADY YOUR NERVES TO GET RID OF A HANGOVER: NO
HOW MANY TIMES IN THE PAST YEAR HAVE YOU HAD 5 OR MORE DRINKS IN A DAY: 3
HAVE PEOPLE ANNOYED YOU BY CRITICIZING YOUR DRINKING: NO
EVER FELT BAD OR GUILTY ABOUT YOUR DRINKING: NO
CONSUMPTION TOTAL: POSITIVE
DO YOU DRINK ALCOHOL: YES
AVERAGE NUMBER OF DAYS PER WEEK YOU HAVE A DRINK CONTAINING ALCOHOL: 6
EVER_SMOKED: NEVER
HAVE YOU EVER FELT YOU SHOULD CUT DOWN ON YOUR DRINKING: NO
TOTAL SCORE: 0
ON A TYPICAL DAY WHEN YOU DRINK ALCOHOL HOW MANY DRINKS DO YOU HAVE: 4
TOTAL SCORE: 0
TOTAL SCORE: 0

## 2017-07-25 ASSESSMENT — PAIN SCALES - GENERAL: PAINLEVEL_OUTOF10: 6
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
## 2017-07-25 NOTE — DISCHARGE PLANNING
Dr. Bruce has accepted Bernardino to inpatient rehab. Transport is scheduled at 4:30p today. Nursing to call report to ext [phone]. HETAL Salazar aware. Thank you for the opportunity to assist as this individual transitions to post acute care at [facility].

## 2017-07-25 NOTE — DISCHARGE PLANNING
Physiatry consult recommending pt appropriate for acute rehab. Insurance has authorized IRF. TCC to follow for medical clearance in anticipation of admission to [facility].

## 2017-07-25 NOTE — DISCHARGE PLANNING
Acute rehab pre-admit screen forwarded to Dr. Bruce for review. Voice message to  ext [phone] regarding anticipated admission to [facility] today. Will follow for d/c summary.
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
## 2017-07-25 NOTE — CARE PLAN
Problem: Safety  Goal: Will remain free from falls  Outcome: PROGRESSING AS EXPECTED    Problem: Skin Integrity  Goal: Risk for impaired skin integrity will decrease  Outcome: PROGRESSING AS EXPECTED  Pt able to make position changes on own with minimal assistance from staff
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
## 2017-07-25 NOTE — DISCHARGE PLANNING
F/U with client at bedside to discuss IRF referral. Pt tells me he is aware of specifics of inpatient rehab, and is agreeable to admit. Advised insurance has authorized admission and TCC will follow for medical clearance in anticipation transfer to [facility]. Pt voiced understanding. At conclusion of visit, pt stated all questions/concerns addressed. Will follow for updates.

## 2017-07-26 ENCOUNTER — APPOINTMENT (OUTPATIENT)
Dept: RADIOLOGY | Facility: REHABILITATION | Age: 67
DRG: 052 | End: 2017-07-26
Attending: PHYSICAL MEDICINE & REHABILITATION
Payer: MEDICARE

## 2017-07-26 PROBLEM — F32.A DEPRESSION: Status: ACTIVE | Noted: 2017-07-26

## 2017-07-26 PROBLEM — R63.0 POOR APPETITE: Status: ACTIVE | Noted: 2017-07-26

## 2017-07-26 PROBLEM — S24.103A: Status: ACTIVE | Noted: 2017-07-26

## 2017-07-26 LAB
ALBUMIN SERPL BCP-MCNC: 2.4 G/DL (ref 3.2–4.9)
ALBUMIN/GLOB SERPL: 0.7 G/DL
ALP SERPL-CCNC: 42 U/L (ref 30–99)
ALT SERPL-CCNC: 13 U/L (ref 2–50)
ANION GAP SERPL CALC-SCNC: 7 MMOL/L (ref 0–11.9)
AST SERPL-CCNC: 23 U/L (ref 12–45)
BASOPHILS # BLD AUTO: 0.6 % (ref 0–1.8)
BASOPHILS # BLD: 0.02 K/UL (ref 0–0.12)
BILIRUB SERPL-MCNC: 0.9 MG/DL (ref 0.1–1.5)
BUN SERPL-MCNC: 7 MG/DL (ref 8–22)
CALCIUM SERPL-MCNC: 8.3 MG/DL (ref 8.5–10.5)
CHLORIDE SERPL-SCNC: 106 MMOL/L (ref 96–112)
CHOLEST SERPL-MCNC: 87 MG/DL (ref 100–199)
CO2 SERPL-SCNC: 20 MMOL/L (ref 20–33)
CREAT SERPL-MCNC: 0.49 MG/DL (ref 0.5–1.4)
EOSINOPHIL # BLD AUTO: 0.12 K/UL (ref 0–0.51)
EOSINOPHIL NFR BLD: 3.9 % (ref 0–6.9)
ERYTHROCYTE [DISTWIDTH] IN BLOOD BY AUTOMATED COUNT: 38.4 FL (ref 35.9–50)
GFR SERPL CREATININE-BSD FRML MDRD: >60 ML/MIN/1.73 M 2
GLOBULIN SER CALC-MCNC: 3.3 G/DL (ref 1.9–3.5)
GLUCOSE SERPL-MCNC: 72 MG/DL (ref 65–99)
HCT VFR BLD AUTO: 34.5 % (ref 42–52)
HDLC SERPL-MCNC: 19 MG/DL
HGB BLD-MCNC: 12.1 G/DL (ref 14–18)
IMM GRANULOCYTES # BLD AUTO: 0.02 K/UL (ref 0–0.11)
IMM GRANULOCYTES NFR BLD AUTO: 0.6 % (ref 0–0.9)
LDLC SERPL CALC-MCNC: 54 MG/DL
LYMPHOCYTES # BLD AUTO: 0.78 K/UL (ref 1–4.8)
LYMPHOCYTES NFR BLD: 25.3 % (ref 22–41)
MAGNESIUM SERPL-MCNC: 1.5 MG/DL (ref 1.5–2.5)
MCH RBC QN AUTO: 31.4 PG (ref 27–33)
MCHC RBC AUTO-ENTMCNC: 35.1 G/DL (ref 33.7–35.3)
MCV RBC AUTO: 89.6 FL (ref 81.4–97.8)
MONOCYTES # BLD AUTO: 0.33 K/UL (ref 0–0.85)
MONOCYTES NFR BLD AUTO: 10.7 % (ref 0–13.4)
NEUTROPHILS # BLD AUTO: 1.81 K/UL (ref 1.82–7.42)
NEUTROPHILS NFR BLD: 58.9 % (ref 44–72)
NRBC # BLD AUTO: 0 K/UL
NRBC BLD AUTO-RTO: 0 /100 WBC
PHOSPHATE SERPL-MCNC: 3.2 MG/DL (ref 2.5–4.5)
PLATELET # BLD AUTO: 162 K/UL (ref 164–446)
PMV BLD AUTO: 11.4 FL (ref 9–12.9)
POTASSIUM SERPL-SCNC: 3.7 MMOL/L (ref 3.6–5.5)
PREALB SERPL-MCNC: <3 MG/DL (ref 18–38)
PROT SERPL-MCNC: 5.7 G/DL (ref 6–8.2)
RBC # BLD AUTO: 3.85 M/UL (ref 4.7–6.1)
SODIUM SERPL-SCNC: 133 MMOL/L (ref 135–145)
TRIGL SERPL-MCNC: 71 MG/DL (ref 0–149)
WBC # BLD AUTO: 3.1 K/UL (ref 4.8–10.8)

## 2017-07-26 PROCEDURE — 80053 COMPREHEN METABOLIC PANEL: CPT

## 2017-07-26 PROCEDURE — 97166 OT EVAL MOD COMPLEX 45 MIN: CPT

## 2017-07-26 PROCEDURE — 94760 N-INVAS EAR/PLS OXIMETRY 1: CPT

## 2017-07-26 PROCEDURE — 97535 SELF CARE MNGMENT TRAINING: CPT

## 2017-07-26 PROCEDURE — 85025 COMPLETE CBC W/AUTO DIFF WBC: CPT

## 2017-07-26 PROCEDURE — 99223 1ST HOSP IP/OBS HIGH 75: CPT | Performed by: PHYSICAL MEDICINE & REHABILITATION

## 2017-07-26 PROCEDURE — 700102 HCHG RX REV CODE 250 W/ 637 OVERRIDE(OP): Performed by: PHYSICAL MEDICINE & REHABILITATION

## 2017-07-26 PROCEDURE — 97163 PT EVAL HIGH COMPLEX 45 MIN: CPT

## 2017-07-26 PROCEDURE — 84100 ASSAY OF PHOSPHORUS: CPT

## 2017-07-26 PROCEDURE — 36415 COLL VENOUS BLD VENIPUNCTURE: CPT

## 2017-07-26 PROCEDURE — A9270 NON-COVERED ITEM OR SERVICE: HCPCS | Performed by: PHYSICAL MEDICINE & REHABILITATION

## 2017-07-26 PROCEDURE — 97110 THERAPEUTIC EXERCISES: CPT

## 2017-07-26 PROCEDURE — 770010 HCHG ROOM/CARE - REHAB SEMI PRIVAT*

## 2017-07-26 PROCEDURE — 83735 ASSAY OF MAGNESIUM: CPT

## 2017-07-26 PROCEDURE — 84134 ASSAY OF PREALBUMIN: CPT

## 2017-07-26 PROCEDURE — 80061 LIPID PANEL: CPT

## 2017-07-26 RX ORDER — MIRTAZAPINE 15 MG/1
15 TABLET, ORALLY DISINTEGRATING ORAL
Status: DISCONTINUED | OUTPATIENT
Start: 2017-07-26 | End: 2017-08-26 | Stop reason: HOSPADM

## 2017-07-26 RX ORDER — POLYETHYLENE GLYCOL 3350 17 G/17G
1 POWDER, FOR SOLUTION ORAL
Status: DISCONTINUED | OUTPATIENT
Start: 2017-07-26 | End: 2017-07-30

## 2017-07-26 RX ORDER — AMOXICILLIN 250 MG
2 CAPSULE ORAL
Status: DISCONTINUED | OUTPATIENT
Start: 2017-07-26 | End: 2017-07-30

## 2017-07-26 RX ORDER — BACLOFEN 10 MG/1
10 TABLET ORAL
Status: DISCONTINUED | OUTPATIENT
Start: 2017-07-26 | End: 2017-07-27

## 2017-07-26 RX ADMIN — BACLOFEN 10 MG: 10 TABLET ORAL at 20:10

## 2017-07-26 RX ADMIN — OXYCODONE HYDROCHLORIDE 5 MG: 5 TABLET ORAL at 18:28

## 2017-07-26 RX ADMIN — MIRTAZAPINE 15 MG: 15 TABLET, ORALLY DISINTEGRATING ORAL at 20:10

## 2017-07-26 RX ADMIN — LORAZEPAM 1 MG: 1 TABLET ORAL at 21:29

## 2017-07-26 RX ADMIN — OXYCODONE HYDROCHLORIDE 5 MG: 5 TABLET ORAL at 11:50

## 2017-07-26 ASSESSMENT — ENCOUNTER SYMPTOMS
CHILLS: 0
SPUTUM PRODUCTION: 1
FEVER: 0
MYALGIAS: 1
COUGH: 1
BRUISES/BLEEDS EASILY: 0
HEARTBURN: 0
DEPRESSION: 1
WEAKNESS: 1
PALPITATIONS: 0
HEADACHES: 0
VOMITING: 0
FOCAL WEAKNESS: 1
SHORTNESS OF BREATH: 0
INSOMNIA: 1
CONSTIPATION: 0
NAUSEA: 0
SENSORY CHANGE: 1
DIZZINESS: 0
ABDOMINAL PAIN: 1
WEIGHT LOSS: 1
DOUBLE VISION: 0
BLURRED VISION: 0
NERVOUS/ANXIOUS: 1
DIARRHEA: 1
BACK PAIN: 1

## 2017-07-26 ASSESSMENT — COPD QUESTIONNAIRES
COPD SCREENING SCORE: 2
DO YOU EVER COUGH UP ANY MUCUS OR PHLEGM?: NO/ONLY WITH OCCASIONAL COLDS OR INFECTIONS
HAVE YOU SMOKED AT LEAST 100 CIGARETTES IN YOUR ENTIRE LIFE: NO/DON'T KNOW
DURING THE PAST 4 WEEKS HOW MUCH DID YOU FEEL SHORT OF BREATH: NONE/LITTLE OF THE TIME

## 2017-07-26 ASSESSMENT — BRIEF INTERVIEW FOR MENTAL STATUS (BIMS)
BIMS SUMMARY SCORE: 14
ASKED TO RECALL BED: YES, NO CUE REQUIRED
WHAT MONTH IS IT: ACCURATE WITHIN 5 DAYS
WHAT DAY OF THE WEEK IS IT: CORRECT
ASKED TO RECALL BLUE: YES, NO CUE REQUIRED
INITIAL REPETITION OF BED BLUE SOCK - FIRST ATTEMPT: 3
ASKED TO RECALL SOCK: YES, AFTER CUEING (SOMETHING TO WEAR")"
WHAT YEAR IS IT: CORRECT

## 2017-07-26 ASSESSMENT — LIFESTYLE VARIABLES
EVER_SMOKED: NEVER
SUBSTANCE_ABUSE: 1

## 2017-07-26 ASSESSMENT — PAIN SCALES - GENERAL
PAINLEVEL_OUTOF10: 7
PAINLEVEL_OUTOF10: 9

## 2017-07-26 ASSESSMENT — ACTIVITIES OF DAILY LIVING (ADL): TOILETING: INDEPENDENT

## 2017-07-26 NOTE — PROGRESS NOTES
Late Entry    7/25/17, 0715- Shift report received from off going RN. Patient in room resting. No distress noted at this time. Call bell at side. Patient advised to call for needs as they arise.     7/25/17, 0800- Patient awake. Breakfast at bedside. Patient denies any assistance with meal set up.     7/25/17, 0930- AM medications given as charted in MAR.     7/25/17, 1100- Patient aided with cleaning. Large Bowel movement noted.     7/25/17, 1620- Report called to [facility] skilled nursing facility. Patient cleansed and dressed. Awaiting 1630 transportation to skilled nursing facility.     7/25/17, 1730- Transportation here, patient transferred to wheel chair by nursing staff.

## 2017-07-26 NOTE — CARE PLAN
Problem: Safety  Goal: Will remain free from injury  Patient demonstrates good safety technique this shift.  Asks for assistance when needed and does not attempt self transfer.  Able to verbalize needs.  Will continue to monitor.    Problem: Pain Management  Goal: Pain level will decrease to patient’s comfort goal  Patient able to perform regular activities this shift.  Pain is control  this shift.  Pain management includes PRN pain meds as well as non-pharmacological measures such as emotional support, rest, and repositioning.  Will continue to monitor.

## 2017-07-26 NOTE — CARE PLAN
"Problem: Communication  Goal: The ability to communicate needs accurately and effectively will improve  Intervention: [place] patient and significant other/support system to call light to alert staff of needs  Patient is alert and oriented x4, patient is able to state his needs and concerns.       Problem: Safety  Goal: Will remain free from falls  Outcome: PROGRESSING AS EXPECTED  Staff oriented patient to his room and equipment around it. Patient also was educated on call light use and fall preventive interventions. Call light within reach, bed alarms in place and ADL's assisted by staff.     Problem: Venous Thromboembolism (VTW)/Deep Vein Thrombosis (DVT) Prevention:  Goal: Patient will participate in Venous Thrombosis (VTE)/Deep Vein Thrombosis (DVT)Prevention Measures  Outcome: PROGRESSING SLOWER THAN EXPECTED    Problem: Bowel/Gastric:  Goal: Normal bowel function is maintained or improved  Outcome: PROGRESSING SLOWER THAN EXPECTED  Patient refused his ordered Senna for H.S and verbalized to staff. No I don't need that, they just gave me bowel meds earlier. That should be PRN.\" as verbalized by patient. Patient also reported that he had a medium formed B.M earlier during the day.     Problem: Knowledge Deficit  Goal: Knowledge of disease process/condition, treatment plan, diagnostic tests, and medications will improve  Outcome: PROGRESSING SLOWER THAN EXPECTED  Prescribed Lovenox injection for 2100 refused by patient and stated \"No, I don't take blood thinners. Because I just don't want to.\" as verbalized by patient. Patient was adamant or refusing the ordered medication even after patient was educated on the purpose and brief mechanism of the medication.     Problem: Pain Management  Goal: Pain level will decrease to patient’s comfort goal  Outcome: PROGRESSING SLOWER THAN EXPECTED  Patient able to verbalize pain level and had complaints of aching/ throbbing  6/10 left wrist discomfort. PRN Roxicodone given " per patient request. Patient briefly educated on measures to manage pain non- pharmacologically. Will continue to monitor patient and call light with reach of  Patient.
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
## 2017-07-26 NOTE — CARE PLAN
Problem: Mobility  Goal: STG-Within one week, patient will propel wheelchair community  1) Individualized goal: Patient will propel wc SPV outdoors >150 ft x 2 over mild inclines and declines  2) Interventions: PT E Stim Attended, PT Orthotics Training, PT Gait Training, PT Self Care/Home Eval, PT Therapeutic Exercises, PT Neuro Re-Ed/Balance, PT Aquatic Therapy, PT Therapeutic Activity, PT Manual Therapy and PT Evaluation.  Outcome: NOT MET  [facility] 7/26    Problem: Mobility Transfers  Goal: STG-Within one week, patient will perform bed mobility  1) Individualized goal: Patient will perform bed mobility SPV consistently with use of bed rails  2) Interventions: PT E Stim Attended, PT Orthotics Training, PT Gait Training, PT Self Care/Home Eval, PT Therapeutic Exercises, PT Neuro Re-Ed/Balance, PT Aquatic Therapy, PT Therapeutic Activity, PT Manual Therapy and PT Evaluation.  Outcome: NOT MET  Eval 7/26  Goal: STG-Within one week, patient will transfer bed to chair  1) Individualized goal: Patient will transfer min A consistently wc <> bed with set up support  2) Interventions: PT E Stim Attended, PT Orthotics Training, PT Gait Training, PT Self Care/Home Eval, PT Therapeutic Exercises, PT Neuro Re-Ed/Balance, PT Aquatic Therapy, PT Therapeutic Activity, PT Manual Therapy and PT Evaluation.  Outcome: NOT MET  al 7/26

## 2017-07-26 NOTE — PROGRESS NOTES
Patient admitted to facility at 1800 via wc; accompanied by hospital transport.  Patient assisted to room and positioned in bed for comfort and safety; call light within reach.  Patient assisted with stowing belongings and oriented to room and facility.  Admission assessment performed and documented in computer.  Admission paperwork completed; signed copies placed in chart.  Will continue to monitor.

## 2017-07-26 NOTE — REHAB-PT IDT TEAM NOTE
"Physical Therapy  Mobility  Bed mobility:  Min A per OT report with bed rail  Bed /Chair/Wheelchair Transfer Initial:  0 - Not tested, patient refused  Bed /Chair/Wheelchair Transfer Current:  0 - Not tested, patient refused (2 person per OT report due to impulsive transfer with no wc brakes- patient's wc)   Bed/Chair/Wheelchair Transfer Description:     Walk Initial:  0 - Not tested, patient refused  Walk Current:  0 - Not tested, patient refused- Patient does not wish to work on ambulation/ has not been ambulatory in \"a long time\"   Walk Description:     Wheelchair Initial:  6 - Modified Independent  Wheelchair Current:  6 - Modified Independent   Wheelchair Description:   (indoors)  Stairs Initial:  0 - Not tested,medical condition  Stairs Current: 0 - Not tested,medical condition   Stairs Description:    Patient/Family Training/Education: Patient educated on pressure relief strategies, POC   DME/DC Recommendations:  New narrow ultra lightweight wc with brakes, BLE AFOs for stability in transfers, home health  Strengths:  Good insight into deficits/needs, Independent PLOF, Motivated for self care and independence, Pleasant and cooperative and Willingly participates in therapeutic activities  Barriers:   Decreased endurance, Generalized weakness, Limited mobility, Poor activity tolerance, Poor balance and Other: Paraplegia, 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   # of short term goals set= 3  # of short term goals met=0 (Eval 7/26)       Physical Therapy Problems           Problem: Mobility     Dates: Start: 07/26/17       Goal: STG-Within one week, patient will propel wheelchair community     Dates: Start: 07/26/17      Description: 1) Individualized goal:  Patient will propel wc SPV outdoors >150 ft x 2 over mild inclines and declines    2) Interventions:  PT E Stim Attended, PT Orthotics Training, PT Gait Training, PT Self Care/Home Eval, PT Therapeutic Exercises, PT Neuro Re-Ed/Balance, PT Aquatic " Therapy, PT Therapeutic Activity, PT Manual Therapy and PT Evaluation.        Note: Goal Note filed on 07/26/17 1625 by Michelle Talavera DPT    Goal: STG-Within one week, patient will propel wheelchair community  Outcome: NOT MET  Eval 7/26            Problem: Mobility Transfers     Dates: Start: 07/26/17       Goal: STG-Within one week, patient will perform bed mobility     Dates: Start: 07/26/17      Description: 1) Individualized goal:  Patient will perform bed mobility SPV consistently with use of bed rails    2) Interventions:  PT E Stim Attended, PT Orthotics Training, PT Gait Training, PT Self Care/Home Eval, PT Therapeutic Exercises, PT Neuro Re-Ed/Balance, PT Aquatic Therapy, PT Therapeutic Activity, PT Manual Therapy and PT Evaluation.        Note: Goal Note filed on 07/26/17 1625 by Michelle Talavera DPT    Goal: STG-Within one week, patient will perform bed mobility  Outcome: NOT MET  Eval 7/26          Goal: STG-Within one week, patient will transfer bed to chair     Dates: Start: 07/26/17      Description: 1) Individualized goal:  Patient will transfer min A consistently wc <> bed with set up support    2) Interventions:  PT E Stim Attended, PT Orthotics Training, PT Gait Training, PT Self Care/Home Eval, PT Therapeutic Exercises, PT Neuro Re-Ed/Balance, PT Aquatic Therapy, PT Therapeutic Activity, PT Manual Therapy and PT Evaluation.        Note: Goal Note filed on 07/26/17 1625 by Michelle Talavera DPT    Goal: STG-Within one week, patient will transfer bed to chair  Outcome: NOT MET  Eval 7/26            Problem: PT-Long Term Goals     Dates: Start: 07/26/17       Goal: LTG-By discharge, patient will transfer one surface to another     Dates: Start: 07/26/17      Description: 1) Individualized goal:  Patient will transfer mod I wc <> bed/ mod I bed mobility     2) Interventions:  PT E Stim Attended, PT Orthotics Training, PT Gait Training, PT Self Care/Home Eval, PT Therapeutic Exercises, PT Neuro  Re-Ed/Balance, PT Aquatic Therapy, PT Therapeutic Activity, PT Manual Therapy and PT Evaluation.            Goal: LTG-By discharge, patient will propel wheelchair     Dates: Start: 07/26/17      Description: 1) Individualized goal:  Patient will propel wc mod I indoors and outdoors over various inclines and surfaces >300 ft.    2) Interventions:  PT E Stim Attended, PT Orthotics Training, PT Gait Training, PT Self Care/Home Eval, PT Therapeutic Exercises, PT Neuro Re-Ed/Balance, PT Aquatic Therapy, PT Therapeutic Activity, PT Manual Therapy and PT Evaluation.                    Section completed by:  Michelle Talavera PT, DPT

## 2017-07-26 NOTE — REHAB-PHARMACY IDT TEAM NOTE
Pharmacy  Pharmacy  Antibiotics/Antifungals/Antivirals:  Medication:      Active Orders     None        Route:         None  Stop Date:  N/A  Reason:   Antihypertensives/Cardiac:  Medication:    Orders     None        Patient Vitals for the past 24 hrs:   BP Pulse   07/26/17 1430 104/72 mmHg 86   07/26/17 1100 116/80 mmHg (!) 106   07/26/17 0831 115/72 mmHg (!) 112   07/26/17 0815 - 98   07/26/17 0645 138/74 mmHg 80   07/25/17 2117 140/90 mmHg 92   07/25/17 1840 145/89 mmHg 99   07/25/17 1828 137/87 mmHg 60       Anticoagulation:  Medication:  Not applicable  INR:      Other key medications:        A review of the medication list reveals no issues at this time.      Section completed by:  Carroll Ricks [facility]

## 2017-07-26 NOTE — IDT DISCHARGE PLANNING
CASE MANAGEMENT INITIAL ASSESSMENT    Admit Date:  7/25/2017     I met with patient to discuss role of case management / discharge planning / team conference.   Patient is a  67 y.o. male transferred from [facility].  He is alert and able to provide all information.  Patient's admitting physician for rehab is Dr. Bruce.    Diagnosis: Paraplegia (CMS-HCC)  Debility  Failure to thrive (0-17)  Leukopenia  Chronic pain    Co-morbidities:   Patient Active Problem List    Diagnosis Date Noted   • Debility 07/17/2017     Priority: High   • Back pain 07/17/2017     Priority: High   • Wrist pain 07/17/2017     Priority: High   • Paraplegia (CMS-HCC) 07/18/2017     Priority: Low   • Chronic pain 07/18/2017     Priority: Low   • Depression 07/26/2017   • Poor appetite 07/26/2017   • T9 spinal cord injury (CMS-HCC) 07/26/2017   • Failure to thrive (0-17) 07/25/2017   • Leukopenia 07/22/2017     Prior Living Situation:  Housing / Facility: [address]  Lives with - Patient's Self Care Capacity: Alone and Able to Care For Self    Prior Level of Function:  Medication Management: Independent  Finances: Independent  Home Management: Independent  Shopping: Independent  Prior Level Of Mobility: Independent With Device in Community  Driving / Transportation: Driving Independent  Pt. Description Of Current ADL Function: Mobility Problems    Support Systems:  Primary : Friend is Shay Davies at [phone], Son is Robert Sauer  Other support systems:        Previous Services Utilized:   Equipment Owned: Single Point Cane, Wheelchair, Tub Transfer Bench  Prior Services: Home-Independent    Other Information:  Occupation (Pre-Hospital Vocational): Retired Due To Age  Pharmacy: [employer]  Primary Payor Source: [employer]  Primary Care Practitioner : Annmarie Terrell or Kaylan Forbes for primary care.    Additional Case Management Questions:  Have you ever received case management services for yourself or a family member?  yes    Do you  feel you have an an understanding of of what services  provide? yes    Do you have any additional questions regarding case management?    Inquired about completion of a will and possible advanced directive.  Also, discussed potential dme needs.    Patient / Family Goal:  Patient / Family Goal: Patient hopes to return home alone and possibly hire additional help.  We discussed the PAS program as a possible referral for home assistance.  He will let me know if he wishes to pursue this.  Therapist has called Nan for new AFO.  She has also notified Kaila Hernandez for w/c assessment.  He may need a new chair and cushion or a repair of his current w/c.  He is interested in completing a will of some sort and I will see if the [facility] has anyone who assists with legal issues.  I have left an advanced directive packet in case needed.  I will follow.    Plan:  1. Continue to follow patient through hospitalization and provide discharge planning in collaboration with patient, family, physicians and ancillary services.     2. Utilize community resources to ensure a safe discharge.

## 2017-07-26 NOTE — THERAPY
Physical Therapy Initial Plan of Care Note    1) Assessment: Patient is 67 y.o. 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  Additional factors influencing patient status / progress (ie: cognitive factors, co-morbidities, social support, etc): L wrist pain, hx of falls.  Long term and short term goals have been discussed with patient and they are in agreement.  2) Plan: Recommend Physical Therapy  minutes per day 5-6 days per week for 3  weeks for the following treatments:  PT E Stim Attended, PT Orthotics Training, PT Gait Training, PT Self Care/Home Eval, PT Therapeutic Exercises, PT Neuro Re-Ed/Balance, PT Aquatic Therapy, PT Therapeutic Activity, PT Manual Therapy and PT Evaluation.  3) Goals:  Please refer to care plan for goals.

## 2017-07-26 NOTE — FLOWSHEET NOTE
07/26/17 0815   Type of Assessment   Assessment Yes   Patient History   Pulmonary Diagnosis no   Surgical Procedures no   Home O2 No   Home Treatments/Frequency No   COPD Risk Screening   Do you have a history of COPD? No   COPD Population Screener   During the past 4 weeks, how much did you feel short of breath? 0   Do you ever cough up any mucus or phlegm? 0   In the past 12 months, you do less than you used to because of your breathing problems 0   Have you smoked at least 100 cigarettes in your entire life? 0   How old are you? 2   COPD Screening Score 2   Smoking History   Have you Ever Smoked Never   Level Of Consciousness   Level of Consciousness Alert   Respiratory WDL   Respiratory (WDL) X   Chest Exam   Work Of Breathing / Effort Mild   Respiration 18   Pulse 98   Breath Sounds   RUL Breath Sounds Clear   RML Breath Sounds Clear;Diminished   RLL Breath Sounds Diminished   EMILIANO Breath Sounds Clear   LLL Breath Sounds Diminished   Secretions   Cough Non Productive   Oximetry   #Pulse Oximetry (Single Determination) Yes   Oxygen   Home O2 Use Prior To Admission? No   Pulse Oximetry 97 %   O2 (LPM) 0   O2 (FiO2) 21   Protocol Pathways   Protocol Pathways Yes   O2 Protocol   O2 Protocol Indications Room Air SpO2 Less Than 90%   O2 Protocol Goals/Outcome Normoxemia on Oxygen, SpO2 greater than 90%

## 2017-07-27 ENCOUNTER — APPOINTMENT (OUTPATIENT)
Dept: RADIOLOGY | Facility: REHABILITATION | Age: 67
DRG: 052 | End: 2017-07-27
Attending: PHYSICAL MEDICINE & REHABILITATION
Payer: MEDICARE

## 2017-07-27 PROCEDURE — 99233 SBSQ HOSP IP/OBS HIGH 50: CPT | Performed by: PHYSICAL MEDICINE & REHABILITATION

## 2017-07-27 PROCEDURE — 97110 THERAPEUTIC EXERCISES: CPT

## 2017-07-27 PROCEDURE — 770010 HCHG ROOM/CARE - REHAB SEMI PRIVAT*

## 2017-07-27 PROCEDURE — 700102 HCHG RX REV CODE 250 W/ 637 OVERRIDE(OP): Performed by: PHYSICAL MEDICINE & REHABILITATION

## 2017-07-27 PROCEDURE — 97112 NEUROMUSCULAR REEDUCATION: CPT

## 2017-07-27 PROCEDURE — A9270 NON-COVERED ITEM OR SERVICE: HCPCS | Performed by: PHYSICAL MEDICINE & REHABILITATION

## 2017-07-27 PROCEDURE — 97530 THERAPEUTIC ACTIVITIES: CPT

## 2017-07-27 PROCEDURE — 97535 SELF CARE MNGMENT TRAINING: CPT

## 2017-07-27 PROCEDURE — 73110 X-RAY EXAM OF WRIST: CPT | Mod: RT

## 2017-07-27 RX ORDER — IBUPROFEN 400 MG/1
800 TABLET ORAL 3 TIMES DAILY
Status: COMPLETED | OUTPATIENT
Start: 2017-07-27 | End: 2017-07-31

## 2017-07-27 RX ORDER — BACLOFEN 10 MG/1
10 TABLET ORAL
Status: DISCONTINUED | OUTPATIENT
Start: 2017-07-27 | End: 2017-08-26 | Stop reason: HOSPADM

## 2017-07-27 RX ORDER — OXYCODONE HYDROCHLORIDE 5 MG/1
5 TABLET ORAL EVERY 4 HOURS PRN
Status: DISCONTINUED | OUTPATIENT
Start: 2017-07-27 | End: 2017-08-26 | Stop reason: HOSPADM

## 2017-07-27 RX ORDER — TIZANIDINE 4 MG/1
2 TABLET ORAL EVERY EVENING
Status: DISCONTINUED | OUTPATIENT
Start: 2017-07-27 | End: 2017-07-27

## 2017-07-27 RX ADMIN — IBUPROFEN 800 MG: 400 TABLET ORAL at 18:24

## 2017-07-27 RX ADMIN — IBUPROFEN 800 MG: 400 TABLET ORAL at 20:24

## 2017-07-27 RX ADMIN — OXYCODONE HYDROCHLORIDE 5 MG: 5 TABLET ORAL at 18:24

## 2017-07-27 RX ADMIN — LORAZEPAM 1 MG: 1 TABLET ORAL at 22:06

## 2017-07-27 RX ADMIN — OXYCODONE HYDROCHLORIDE 5 MG: 5 TABLET ORAL at 13:10

## 2017-07-27 RX ADMIN — MIRTAZAPINE 15 MG: 15 TABLET, ORALLY DISINTEGRATING ORAL at 20:24

## 2017-07-27 ASSESSMENT — PAIN SCALES - GENERAL
PAINLEVEL_OUTOF10: 6
PAINLEVEL_OUTOF10: 1
PAINLEVEL_OUTOF10: 6
PAINLEVEL_OUTOF10: 1
PAINLEVEL_OUTOF10: 6

## 2017-07-27 NOTE — PROGRESS NOTES
"Rehab Progress Note     Chief complaint: follow up history of T9 spinal cord injury, debility/failure to thrive  Date of Service: 7/27/2017    Interval Events (Subjective)  Patient seen and examined. No acute events overnight. RN reports another incontinent bowel movement, but the patient denies this occurred. Is having pain in the right wrist. The orthotist is here today to evaluate the patient for bilateral AFOs. Patient seen during his PT session and is able to  the parallel bars for about a minute. Both legs are able to get to full extension.    Objective:  VITAL SIGNS: /87 mmHg  Pulse 93  Temp(Src) 36.3 °C (97.4 °F)  Resp 16  Ht 1.93 m (6' 4\")  Wt 83.008 kg (183 lb)  BMI 22.28 kg/m2  SpO2 98%  Gen: alert, no apparent distress  CV: regular rate and rhythm, no murmurs, no peripheral edema  Resp: clear to ascultation bilaterally, normal respiratory effort  GI: soft, non-tender abdomen, bowel sounds present  Neuro: notable for flaccid right leg with muscular atrophy, increased tone at the knee, possible knee flexor contraction?, left hip, knee, and ankle 4 out of 5, also with increased tone at the left knee and possible flexure contracture?, patchy sensory changes in the bilateral legs    Recent Results (from the past 72 hour(s))   BASIC METABOLIC PANEL    Collection Time: 07/25/17  4:26 AM   Result Value Ref Range    Sodium 134 (L) 135 - 145 mmol/L    Potassium 3.6 3.6 - 5.5 mmol/L    Chloride 109 96 - 112 mmol/L    Co2 19 (L) 20 - 33 mmol/L    Glucose 92 65 - 99 mg/dL    Bun 6 (L) 8 - 22 mg/dL    Creatinine 0.65 0.50 - 1.40 mg/dL    Calcium 8.1 (L) 8.4 - 10.2 mg/dL    Anion Gap 6.0 0.0 - 11.9   ESTIMATED GFR    Collection Time: 07/25/17  4:26 AM   Result Value Ref Range    GFR If African American >60 >60 mL/min/1.73 m 2    GFR If Non African American >60 >60 mL/min/1.73 m 2   Comp Metabolic Panel (CMP)    Collection Time: 07/26/17  5:40 AM   Result Value Ref Range    Sodium 133 (L) 135 - 145 " mmol/L    Potassium 3.7 3.6 - 5.5 mmol/L    Chloride 106 96 - 112 mmol/L    Co2 20 20 - 33 mmol/L    Anion Gap 7.0 0.0 - 11.9    Glucose 72 65 - 99 mg/dL    Bun 7 (L) 8 - 22 mg/dL    Creatinine 0.49 (L) 0.50 - 1.40 mg/dL    Calcium 8.3 (L) 8.5 - 10.5 mg/dL    AST(SGOT) 23 12 - 45 U/L    ALT(SGPT) 13 2 - 50 U/L    Alkaline Phosphatase 42 30 - 99 U/L    Total Bilirubin 0.9 0.1 - 1.5 mg/dL    Albumin 2.4 (L) 3.2 - 4.9 g/dL    Total Protein 5.7 (L) 6.0 - 8.2 g/dL    Globulin 3.3 1.9 - 3.5 g/dL    A-G Ratio 0.7 g/dL   CBC with Differential    Collection Time: 07/26/17  5:40 AM   Result Value Ref Range    WBC 3.1 (L) 4.8 - 10.8 K/uL    RBC 3.85 (L) 4.70 - 6.10 M/uL    Hemoglobin 12.1 (L) 14.0 - 18.0 g/dL    Hematocrit 34.5 (L) 42.0 - 52.0 %    MCV 89.6 81.4 - 97.8 fL    MCH 31.4 27.0 - 33.0 pg    MCHC 35.1 33.7 - 35.3 g/dL    RDW 38.4 35.9 - 50.0 fL    Platelet Count 162 (L) 164 - 446 K/uL    MPV 11.4 9.0 - 12.9 fL    Neutrophils-Polys 58.90 44.00 - 72.00 %    Lymphocytes 25.30 22.00 - 41.00 %    Monocytes 10.70 0.00 - 13.40 %    Eosinophils 3.90 0.00 - 6.90 %    Basophils 0.60 0.00 - 1.80 %    Immature Granulocytes 0.60 0.00 - 0.90 %    Nucleated RBC 0.00 /100 WBC    Neutrophils (Absolute) 1.81 (L) 1.82 - 7.42 K/uL    Lymphs (Absolute) 0.78 (L) 1.00 - 4.80 K/uL    Monos (Absolute) 0.33 0.00 - 0.85 K/uL    Eos (Absolute) 0.12 0.00 - 0.51 K/uL    Baso (Absolute) 0.02 0.00 - 0.12 K/uL    Immature Granulocytes (abs) 0.02 0.00 - 0.11 K/uL    NRBC (Absolute) 0.00 K/uL   Lipid Profile (Lipid Panel)    Collection Time: 07/26/17  5:40 AM   Result Value Ref Range    Cholesterol,Tot 87 (L) 100 - 199 mg/dL    Triglycerides 71 0 - 149 mg/dL    HDL 19 (A) >=40 mg/dL    LDL 54 <100 mg/dL   Magnesium    Collection Time: 07/26/17  5:40 AM   Result Value Ref Range    Magnesium 1.5 1.5 - 2.5 mg/dL   Phosphorus    Collection Time: 07/26/17  5:40 AM   Result Value Ref Range    Phosphorus 3.2 2.5 - 4.5 mg/dL   Prealbumin    Collection Time:  07/26/17  5:40 AM   Result Value Ref Range    Pre-Albumin <3.0 (L) 18.0 - 38.0 mg/dL   ESTIMATED GFR    Collection Time: 07/26/17  5:40 AM   Result Value Ref Range    GFR If African American >60 >60 mL/min/1.73 m 2    GFR If Non African American >60 >60 mL/min/1.73 m 2       Current Facility-Administered Medications   Medication Frequency   • mirtazapine (REMERON) orally disintegrating tab 15 mg QHS   • baclofen (LIORESAL) tablet 10 mg QHS   • senna-docusate (PERICOLACE or SENOKOT S) 8.6-50 MG per tablet 2 Tab QDAY PRN    And   • polyethylene glycol/lytes (MIRALAX) PACKET 1 Packet QDAY PRN    And   • magnesium hydroxide (MILK OF MAGNESIA) suspension 30 mL QDAY PRN   • guaiFENesin (ROBITUSSIN) 100 MG/5ML solution 200 mg Q4HRS PRN   • Respiratory Care per Protocol Continuous RT   • Pharmacy Consult Request ...Pain Management Review 1 Each PRN   • tramadol (ULTRAM) 50 MG tablet 50 mg Q4HRS PRN   • acetaminophen (TYLENOL) tablet 650 mg Q4HRS PRN   • artificial tears 1.4 % ophthalmic solution 1 Drop PRN   • benzocaine-menthol (CEPACOL) lozenge 1 Lozenge Q2HRS PRN   • mag hydrox-al hydrox-simeth (MAALOX PLUS ES or MYLANTA DS) suspension 20 mL Q2HRS PRN   • trazodone (DESYREL) tablet 50 mg QHS PRN   • sodium chloride (OCEAN) 0.65 % nasal spray 2 Spray PRN   • ondansetron (ZOFRAN ODT) dispertab 4 mg Q4HRS PRN   • acetaminophen (TYLENOL) tablet 650 mg Q4HRS PRN   • lorazepam (ATIVAN) tablet 1 mg QHS   • oxycodone immediate-release (ROXICODONE) tablet 5 mg Q6HRS PRN       Orders Placed This Encounter   Procedures   • Diet Order     Standing Status: Standing      Number of Occurrences: 1      Standing Expiration Date:      Order Specific Question:  Diet:     Answer:  Regular [1]     Radiology  7/27/2017 9:35 AM    HISTORY/REASON FOR EXAM:  Atraumatic Pain/Swelling/Deformity.  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    TECHNIQUE/EXAM DESCRIPTION AND NUMBER OF VIEWS:  4 views of the RIGHT  wrist.    COMPARISON: None    FINDINGS:  Deformity right wrist including partial resection proximal pole of the scaphoid and possible complete excision of the lunate.  The proximal segment of the capitate may articulate with the articulating surface of the radius.  Marked radial carpal and ulnar carpal arthritic changes demonstrated.  Marked arthritic changes first carpometacarpal joint.  No definite acute fracture.  Old fragments posterior soft tissues.  Soft tissue swelling.    Mild osteopenia.         Impression        Postoperative changes right wrist including partial resection of the scaphoid and possible complete excision of the lunate.  Question articulation of the capitate with the articulating surface of the radius.  Marked arthritic changes. No acute fracture identified.  Soft tissue swelling and old fragments within the dorsal soft tissues.     Assessment:  Active Hospital Problems    Diagnosis   • Debility   • Wrist pain   • Back pain   • Paraplegia (CMS-HCC)   • Chronic pain   • Depression   • Poor appetite   • T9 spinal cord injury (CMS-HCC)   • Failure to thrive (0-17)   • Leukopenia     I led and attended the weekly conference today, and agree with the IDT conference documentation and plan of care as noted below.    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   # of short term goals set= 3  # of short term goals met=0  (Eval 7/26)    OT: standby assist or eating/grooming/bathing/dressing/toileting, total assist for transfers to shower and [facility], Strengths:  Able to follow instructions, Good insight into deficits/needs, Making steady progress towards goals, Motivated for self care and independence, Pleasant and cooperative and Willingly participates in therapeutic activities  Barriers:  Decreased endurance, Generalized weakness, Limited mobility, Poor activity tolerance and Poor family support     # of short term goals set= 5    # of short term goals met= 0, due to recent admit       CM/social support: DC destination/dispostion:  Patient lives in a single level apartment.    Referrals: Possible PAS program    DC Needs: Patient has a tub bench, w/c, cushion and cane.  He may need a new w/c and new AFO's.  We have referred patient for evaluation with Joan and Nan.  I have discussed home health follow up and he is okay with this.    Barriers to discharge:   Decreasing function.    Strengths: very motivated.    Anticipated DC date: 8/22/2017    Home health: PT/OT/RN    Equip: 2 new AFOs, new wheelchair, transfer pole in house    Follow up: PCP, SCI clinic    Medical Decision Making and Plan:    T9 incomplete spinal cord injury -with no movement of right leg, 4/5 left leg, increased tone at knees, patchy sensation in both legs. Continue therapy. Being evaluated for new bilateral AFOs and new wheelchair.    Debility/Failure to thrive - multifactorial. I believe the full rehab program to include therapy, psychological consultation, treatment of his depression, and the dietitian has the potential to improve his function.    Pancytopenia - due to poor nutritional intake. Dietician to see patient.     Alcohol overuse - likely using alcohol for his anxiety. This also is the likely reason for his pancytopenia. Dr. Dutton to consult. One week since his last drink. Re-admitted to acute on 7/22. Monitor for signs of  withdrawal.    Spasticity versus contractures - assess with therapy. Schedule baclofen at night. Monitor.    Bilateral wrist pain - continue splint on left. Xray right with severe OA, soft tissue swelling, and evidence of old surgeries. 4 days of motrin. Ice as needed.    Depression/anxiety - started Remeron. Dr. Dutton consult.    Poor appetite - likely due to above. Starting Remeron.    Insomnia - continue ativan at night which patient states helps his sleep. Trazodone as needed.    Cough - guaifenesin PRN. Incentive spirometry.    Bowel incontinence - unclear etiology. Monitor. Patient reports baclofen caused this prior?    DVT prophylaxis - not indicated as patient not a new paraplegic.      Total time:  >35 minutes.  I spent greater than 50% of the time for patient care and coordination on this date, including unit/floor time, and face-to-face time with the patient as per assessment and plan above.    Margo Bruce M.D.

## 2017-07-27 NOTE — REHAB-NURSING IDT TEAM NOTE
Nursing  Nursing  Diet/Nutrition:  Regular and Thin Liquids  Medication Administration:  Whole with Liquid Wash  % consumed at meals in last 24 hours:  Consumed 0-40% of meals per documentation.  Breakfast:  0%   Lunch:  20%  Dinner:  40%   Snack schedule:  None  Appetite:  Poor  Fluid Intake/Output in past 24 hours: In: 920 [P.O.:920]  Out: 925   Admit Weight:  Weight: 83.008 kg (183 lb)  Weight Last 7 Days   [83.008 kg (183 lb)] 83.008 kg (183 lb) (07/25 1828)    Pain Issues:    Location:  Back;Wrist (07/26 1828)  Lower;Left (07/26 1828)         Severity:  Moderate   Scheduled pain medications:  Baclofen    PRN pain medications used in last 24 hours:  oxycodone immediate release (ROXICODONE)    Non Pharmacologic Interventions:  distraction, emotional support, relaxation and repositioned  Effectiveness of pain management plan:  fair=improved comfort with interventions but does not always meet goal    Bowel:    Bowel Assist Initial Score:  2 - Max Assistance  Bowel Assist Current Score:  1 - Total Assistance  Bowl Accidents in last 7 days:  1  Last bowel movement: 07/26/17  Stool: Small, Loose, Brown     Usual bowel pattern:  daily  Scheduled bowel medications:  None  PRN bowel medications used in last 24 hours:  None  Nursing Interventions:  Increased time, Scheduled medication, Supervision, Brief  Effectiveness of bowel program:   poor=continues to have bowel accidents  Bladder:    Bladder Assist Initial Score:  5 - Standby Prompting/Supervision or Set-up  Bladder Assist Current Score:  3 - Moderate Assistance  Bladder Accidents in last 7 days:  0  PVR range for past 24-48 hours:  [64-75]   Medications affecting bladder:  None    Time void schedule/voiding pattern:  Time void every 3 hours during the day and every 4 hours at night  Interventions:  Increased time, Supervision, Verbal cueing, Emptying of device, Brief  Effectiveness of bladder training:  Good=regular, predictable, emptying of bladder, patient  initiates time voiding    Wound:         Patient Lines/Drains/Airways Status    Active Current Wounds     Name: Placement date: Placement time: Site: Days:    Wound Not POA Skin Tear Forearm Right 07/22/17 2230   4                   Interventions:  monitor  Effectiveness of intervention:  wound is unchanged    Sleep/wake cycle:   Average hours slept:  Sleeps 4-6 hours without waking  Sleep medication usage:  Other trazodone prn    Patient/Family Training/Education:  Fall Prevention, General Self Care, Medication Management, Pain Management, Safe Transfers, Safety and Skin Care  Strengths: Alert and oriented, Willingly participates in therapeutic activities, Able to follow instructions and Pleasant and cooperative   Barriers:   Bladder incontinence, Bowel incontinence, Fatigue, Generalized weakness, Limited mobility and Poor appetite            Nursing Problems           Problem: Bowel/Gastric:     Goal: Normal bowel function is maintained or improved         Goal: Will not experience complications related to bowel motility           Problem: Communication     Goal: The ability to communicate needs accurately and effectively will improve           Problem: Discharge Barriers/Planning     Goal: Patient's continuum of care needs will be met           Problem: Infection     Goal: Will remain free from infection           Problem: Knowledge Deficit     Goal: Knowledge of disease process/condition, treatment plan, diagnostic tests, and medications will improve         Goal: Knowledge of the prescribed therapeutic regimen will improve           Problem: Mobility     Goal: Risk for activity intolerance will decrease           Problem: Pain Management     Goal: Pain level will decrease to patient's comfort goal           Problem: Safety     Goal: Will remain free from injury         Goal: Will remain free from falls           Problem: Venous Thromboembolism (VTW)/Deep Vein Thrombosis (DVT) Prevention:     Goal: Patient will  participate in Venous Thrombosis (VTE)/Deep Vein Thrombosis (DVT)Prevention Measures                Long Term Goals:   At discharge patient will be able to function safely at home and in the community with support.    Section completed by:  Eduardo Weeks R.N.

## 2017-07-27 NOTE — REHAB-OT IDT TEAM NOTE
Occupational Therapy  Activities of Daily Living  Eating Initial:  5 - Standby Prompting/Supervision or Set-up  Eating Current:  5 - Standby Prompting/Supervision or Set-up   Eating Description:  Increased time, Supervision for safety, Verbal cueing  Grooming Initial:  5 - Standby Prompting/Supervision or Set-up  Grooming Current:  5 - Standby Prompting/Supervision or Set-up   Grooming Description:  Supervision for safety  Bathing Initial:  5 - Standby Prompting/Supervision or Set-up  Bathing Current:  5 - Standby Prompting/Supervision or Set-up   Bathing Description:  Grab bar, Tub bench, Hand held shower, Increased time, Supervision for safety, Verbal cueing, Set-up of equipment, Initial preparation for task  Upper Body Dressing Initial:  5 - Standby Prompting/Supervision or Set-up  Upper Body Dressing Current:  5 - Standby Prompting/Supervision or Set-up   Upper Body Dressing Description:     Lower Body Dressing Initial:  5 - Standby Prompting/Supervsion or Set-up  Lower Body Dressing Current:  5 - Standby Prompting/Supervsion or Set-up   Lower Body Dressing Description:  5 - Standby Prompting/Supervsion or Set-up  Toileting Initial:  5 - Standby Prompting/Supervision or Set-up  Toileting Current:  5 - Standby Prompting/Supervision or Set-up   Toileting Description:  Increased time, Verbal cueing, using urinal, max assist for bowel movement  Toilet Transfer Initial:  0 - Not tested, patient refused  Toilet Transfer Current:  0 - Not tested, patient refused   Toilet Transfer Description:  0 - Not tested, patient refused  Tub / Shower Transfer Initial:  1 - Total Assistance  Tub / Shower Transfer Current:  1 - Total Assistance   Tub / Shower Transfer Description:  Grab bar, Increased time, Supervision for safety, Verbal cueing, Set-up of equipment, Initial preparation for task, Requires 2 people to assist  IADL:  TBA   Family Training/Education:  ongoing   DME/DC Recommendations:  Has equipment in place at home.      Strengths:  Able to follow instructions, Good insight into deficits/needs, Making steady progress towards goals, Motivated for self care and independence, Pleasant and cooperative and Willingly participates in therapeutic activities  Barriers:  Decreased endurance, Generalized weakness, Limited mobility, Poor activity tolerance and Poor family support     # of short term goals set= 5    # of short term goals met= 0, due to recent admit          Occupational Therapy Goals           Problem: Bathing     Dates: Start: 07/26/17       Goal: STG-Within one week, patient will bathe     Dates: Start: 07/26/17      Description: 1) Individualized Goal:  With mod I using AE and DME prn    2) Interventions:  OT Orthotics Training, OT E Stim Attended, OT Self Care/ADL, OT Cognitive Skill Dev, OT Community Reintegration, OT Manual Ther Technique, OT Neuro Re-Ed/Balance, OT Sensory Int Techniques, OT Therapeutic Activity, OT Evaluation and OT Therapeutic Exercise              Problem: Dressing     Dates: Start: 07/26/17       Goal: STG-Within one week, patient will dress LB     Dates: Start: 07/26/17      Description: 1) Individualized Goal: with supervision, v/c, and AE prn    2) Interventions:  OT Orthotics Training, OT E Stim Attended, OT Self Care/ADL, OT Cognitive Skill Dev, OT Community Reintegration, OT Manual Ther Technique, OT Neuro Re-Ed/Balance, OT Sensory Int Techniques, OT Therapeutic Activity, OT Evaluation and OT Therapeutic Exercise          Problem: Functional Transfers     Dates: Start: 07/26/17       Goal: STG-Within one week, patient will transfer to toilet     Dates: Start: 07/26/17      Description: 1) Individualized Goal:  With mod A using DME and v/c    2) Interventions:  OT Orthotics Training, OT E Stim Attended, OT Self Care/ADL, OT Cognitive Skill Dev, OT Community Reintegration, OT Manual Ther Technique, OT Neuro Re-Ed/Balance, OT Sensory Int Techniques, OT Therapeutic Activity, OT Evaluation and OT  Therapeutic Exercise        Goal: STG-Within one week, patient will transfer to step in shower     Dates: Start: 07/26/17      Description: 1) Individualized Goal:  With mod A using DME prn    2) Interventions:  OT Orthotics Training, OT E Stim Attended, OT Self Care/ADL, OT Cognitive Skill Dev, OT Community Reintegration, OT Manual Ther Technique, OT Neuro Re-Ed/Balance, OT Sensory Int Techniques, OT Therapeutic Activity, OT Evaluation and OT Therapeutic Exercise          Problem: OT Long Term Goals     Dates: Start: 07/27/17       Goal: LTG-By discharge, patient will complete basic self care tasks     Dates: Start: 07/27/17      Description: 1) Individualized Goal: modified independent at wheelchair level    2) Interventions:  OT Self Care/ADL and OT Neuro Re-Ed/Balance            Goal: LTG-By discharge, patient will perform bathroom transfers     Dates: Start: 07/27/17      Description: 1) Individualized Goal: modified independent wheelchair level    2) Interventions:  OT Self Care/ADL and OT Neuro Re-Ed/Balance            Goal: LTG-By discharge, patient will complete basic home management     Dates: Start: 07/27/17      Description: 1) Individualized Goal:  Modified independent at a wheelchair level.    2) Interventions:  OT Self Care/ADL, OT Community Reintegration, OT Neuro Re-Ed/Balance, OT Therapeutic Activity and OT Therapeutic Exercise              Problem: Toileting     Dates: Start: 07/26/17       Goal: STG-Within one week, patient will complete toileting tasks     Dates: Start: 07/26/17      Description: 1) Individualized Goal:  With supervision    2) Interventions:  OT Orthotics Training, OT E Stim Attended, OT Self Care/ADL, OT Cognitive Skill Dev, OT Community Reintegration, OT Manual Ther Technique, OT Neuro Re-Ed/Balance, OT Sensory Int Techniques, OT Therapeutic Activity, OT Evaluation and OT Therapeutic Exercise              Section completed by:  Claudia Paris MS,OTR/L

## 2017-07-27 NOTE — REHAB-COLLABORATIVE ONGOING IDT TEAM NOTE
Weekly Interdisciplinary Team Conference Note    Weekly Interdisciplinary Team Conference # 1  Date:  7/27/2017    Clinicians present and reporting at team conference include the following:   MD: Margo Bruce MD   RN:  Penelope Michael RN   PT:   Jolanta Means, PT, DPT  OT:  Claudia Paris BS, OTR/L   ST:  Not Applicable  CM:  Ida Hays RN, CCM  REC:  Lara Briceno, CTRS  RT:  None  RPh:  Sebastian Alejandre [facility]  Other:   None  All reporting clinicians have a working knowledge of this patient's plan of care.    Targeted DC Date:  8/22/17     Medical    Patient Active Problem List    Diagnosis Date Noted   • Debility 07/17/2017     Priority: High   • Back pain 07/17/2017     Priority: High   • Wrist pain 07/17/2017     Priority: High   • Paraplegia (CMS-HCC) 07/18/2017     Priority: Low   • Chronic pain 07/18/2017     Priority: Low   • Depression 07/26/2017   • Poor appetite 07/26/2017   • T9 spinal cord injury (CMS-HCC) 07/26/2017   • Failure to thrive (0-17) 07/25/2017   • Leukopenia 07/22/2017     Results     ** No results found for the last 24 hours. **        Nursing  Diet/Nutrition:  Regular and Thin Liquids  Medication Administration:  Whole with Liquid Wash  % consumed at meals in last 24 hours:  Consumed 0-40% of meals per documentation.  Breakfast:  0%   Lunch:  20%  Dinner:  40%   Snack schedule:  None  Appetite:  Poor  Fluid Intake/Output in past 24 hours: In: 920 [P.O.:920]  Out: 925   Admit Weight:  Weight: 83.008 kg (183 lb)  Weight Last 7 Days   [83.008 kg (183 lb)] 83.008 kg (183 lb) (07/25 1828)    Pain Issues:    Location:  Back;Wrist (07/26 1828)  Lower;Left (07/26 1828)         Severity:  Moderate   Scheduled pain medications:  Baclofen    PRN pain medications used in last 24 hours:  oxycodone immediate release (ROXICODONE)    Non Pharmacologic Interventions:  distraction, emotional support, relaxation and repositioned  Effectiveness of pain management plan:  fair=improved comfort with  interventions but does not always meet goal    Bowel:    Bowel Assist Initial Score:  2 - Max Assistance  Bowel Assist Current Score:  1 - Total Assistance  Bowl Accidents in last 7 days:  1  Last bowel movement: 07/26/17  Stool: Small, Loose, Brown     Usual bowel pattern:  daily  Scheduled bowel medications:  None  PRN bowel medications used in last 24 hours:  None  Nursing Interventions:  Increased time, Scheduled medication, Supervision, Brief  Effectiveness of bowel program:   poor=continues to have bowel accidents  Bladder:    Bladder Assist Initial Score:  5 - Standby Prompting/Supervision or Set-up  Bladder Assist Current Score:  3 - Moderate Assistance  Bladder Accidents in last 7 days:  0  PVR range for past 24-48 hours:  [64-75]   Medications affecting bladder:  None    Time void schedule/voiding pattern:  Time void every 3 hours during the day and every 4 hours at night  Interventions:  Increased time, Supervision, Verbal cueing, Emptying of device, Brief  Effectiveness of bladder training:  Good=regular, predictable, emptying of bladder, patient initiates time voiding    Wound:         Patient Lines/Drains/Airways Status    Active Current Wounds     Name: Placement date: Placement time: Site: Days:    Wound Not POA Skin Tear Forearm Right 07/22/17 2230   4                   Interventions:  monitor  Effectiveness of intervention:  wound is unchanged    Sleep/wake cycle:   Average hours slept:  Sleeps 4-6 hours without waking  Sleep medication usage:  Other trazodone prn    Patient/Family Training/Education:  Fall Prevention, General Self Care, Medication Management, Pain Management, Safe Transfers, Safety and Skin Care  Strengths: Alert and oriented, Willingly participates in therapeutic activities, Able to follow instructions and Pleasant and cooperative   Barriers:   Bladder incontinence, Bowel incontinence, Fatigue, Generalized weakness, Limited mobility and Poor appetite            Nursing Problems  "          Problem: Bowel/Gastric:     Goal: Normal bowel function is maintained or improved         Goal: Will not experience complications related to bowel motility           Problem: Communication     Goal: The ability to communicate needs accurately and effectively will improve           Problem: Discharge Barriers/Planning     Goal: Patient's continuum of care needs will be met           Problem: Infection     Goal: Will remain free from infection           Problem: Knowledge Deficit     Goal: Knowledge of disease process/condition, treatment plan, diagnostic tests, and medications will improve         Goal: Knowledge of the prescribed therapeutic regimen will improve           Problem: Mobility     Goal: Risk for activity intolerance will decrease           Problem: Pain Management     Goal: Pain level will decrease to patient's comfort goal           Problem: Safety     Goal: Will remain free from injury         Goal: Will remain free from falls           Problem: Venous Thromboembolism (VTW)/Deep Vein Thrombosis (DVT) Prevention:     Goal: Patient will participate in Venous Thrombosis (VTE)/Deep Vein Thrombosis (DVT)Prevention Measures                Long Term Goals:   At discharge patient will be able to function safely at home and in the community with support.    Section completed by:  Eduardo Weeks R.N.           Mobility  Bed mobility:  Min A per OT report with bed rail  Bed /Chair/Wheelchair Transfer Initial:  0 - Not tested, patient refused  Bed /Chair/Wheelchair Transfer Current:  0 - Not tested, patient refused (2 person per OT report due to impulsive transfer with no wc brakes- patient's wc)   Bed/Chair/Wheelchair Transfer Description:     Walk Initial:  0 - Not tested, patient refused  Walk Current:  0 - Not tested, patient refused- Patient does not wish to work on ambulation/ has not been ambulatory in \"a long time\"   Walk Description:     Wheelchair Initial:  6 - Modified " Independent  Wheelchair Current:  6 - Modified Independent   Wheelchair Description:   (indoors)  Stairs Initial:  0 - Not tested,medical condition  Stairs Current: 0 - Not tested,medical condition   Stairs Description:    Patient/Family Training/Education: Patient educated on pressure relief strategies, POC   DME/DC Recommendations:  New narrow ultra lightweight wc with brakes, BLE AFOs for stability in transfers, home health  Strengths:  Good insight into deficits/needs, Independent PLOF, Motivated for self care and independence, Pleasant and cooperative and Willingly participates in therapeutic activities  Barriers:   Decreased endurance, Generalized weakness, Limited mobility, Poor activity tolerance, Poor balance and Other: Paraplegia, 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   # of short term goals set= 3  # of short term goals met=0 (al 7/26)       Physical Therapy Problems           Problem: Mobility     Dates: Start: 07/26/17       Goal: STG-Within one week, patient will propel wheelchair community     Dates: Start: 07/26/17      Description: 1) Individualized goal:  Patient will propel wc SPV outdoors >150 ft x 2 over mild inclines and declines    2) Interventions:  PT E Stim Attended, PT Orthotics Training, PT Gait Training, PT Self Care/Home Eval, PT Therapeutic Exercises, PT Neuro Re-Ed/Balance, PT Aquatic Therapy, PT Therapeutic Activity, PT Manual Therapy and PT Evaluation.        Note: Goal Note filed on 07/26/17 0485 by Michelle Talavera DPT    Goal: STG-Within one week, patient will propel wheelchair community  Outcome: NOT MET  al 7/26            Problem: Mobility Transfers     Dates: Start: 07/26/17       Goal: STG-Within one week, patient will perform bed mobility     Dates: Start: 07/26/17      Description: 1) Individualized goal:  Patient will perform bed mobility SPV consistently with use of bed rails    2) Interventions:  PT E Stim Attended, PT Orthotics Training, PT Gait Training,  PT Self Care/Home Eval, PT Therapeutic Exercises, PT Neuro Re-Ed/Balance, PT Aquatic Therapy, PT Therapeutic Activity, PT Manual Therapy and PT Evaluation.        Note: Goal Note filed on 07/26/17 1625 by Michelle Talavera DPT    Goal: STG-Within one week, patient will perform bed mobility  Outcome: NOT MET  Eval 7/26          Goal: STG-Within one week, patient will transfer bed to chair     Dates: Start: 07/26/17      Description: 1) Individualized goal:  Patient will transfer min A consistently wc <> bed with set up support    2) Interventions:  PT E Stim Attended, PT Orthotics Training, PT Gait Training, PT Self Care/Home Eval, PT Therapeutic Exercises, PT Neuro Re-Ed/Balance, PT Aquatic Therapy, PT Therapeutic Activity, PT Manual Therapy and PT Evaluation.        Note: Goal Note filed on 07/26/17 1625 by Michelle Talavera DPT    Goal: STG-Within one week, patient will transfer bed to chair  Outcome: NOT MET  Eval 7/26            Problem: PT-Long Term Goals     Dates: Start: 07/26/17       Goal: LTG-By discharge, patient will transfer one surface to another     Dates: Start: 07/26/17      Description: 1) Individualized goal:  Patient will transfer mod I wc <> bed/ mod I bed mobility     2) Interventions:  PT E Stim Attended, PT Orthotics Training, PT Gait Training, PT Self Care/Home Eval, PT Therapeutic Exercises, PT Neuro Re-Ed/Balance, PT Aquatic Therapy, PT Therapeutic Activity, PT Manual Therapy and PT Evaluation.            Goal: LTG-By discharge, patient will propel wheelchair     Dates: Start: 07/26/17      Description: 1) Individualized goal:  Patient will propel wc mod I indoors and outdoors over various inclines and surfaces >300 ft.    2) Interventions:  PT E Stim Attended, PT Orthotics Training, PT Gait Training, PT Self Care/Home Eval, PT Therapeutic Exercises, PT Neuro Re-Ed/Balance, PT Aquatic Therapy, PT Therapeutic Activity, PT Manual Therapy and PT Evaluation.                    Section completed by:   Michelle Talavera PT, DPT    Activities of Daily Living  Eating Initial:  5 - Standby Prompting/Supervision or Set-up  Eating Current:  5 - Standby Prompting/Supervision or Set-up   Eating Description:  Increased time, Supervision for safety, Verbal cueing  Grooming Initial:  5 - Standby Prompting/Supervision or Set-up  Grooming Current:  5 - Standby Prompting/Supervision or Set-up   Grooming Description:  Supervision for safety  Bathing Initial:  5 - Standby Prompting/Supervision or Set-up  Bathing Current:  5 - Standby Prompting/Supervision or Set-up   Bathing Description:  Grab bar, Tub bench, Hand held shower, Increased time, Supervision for safety, Verbal cueing, Set-up of equipment, Initial preparation for task  Upper Body Dressing Initial:  5 - Standby Prompting/Supervision or Set-up  Upper Body Dressing Current:  5 - Standby Prompting/Supervision or Set-up   Upper Body Dressing Description:     Lower Body Dressing Initial:  5 - Standby Prompting/Supervsion or Set-up  Lower Body Dressing Current:  5 - Standby Prompting/Supervsion or Set-up   Lower Body Dressing Description:  5 - Standby Prompting/Supervsion or Set-up  Toileting Initial:  5 - Standby Prompting/Supervision or Set-up  Toileting Current:  5 - Standby Prompting/Supervision or Set-up   Toileting Description:  Increased time, Verbal cueing, using urinal, max assist for bowel movement  Toilet Transfer Initial:  0 - Not tested, patient refused  Toilet Transfer Current:  0 - Not tested, patient refused   Toilet Transfer Description:  0 - Not tested, patient refused  Tub / Shower Transfer Initial:  1 - Total Assistance  Tub / Shower Transfer Current:  1 - Total Assistance   Tub / Shower Transfer Description:  Grab bar, Increased time, Supervision for safety, Verbal cueing, Set-up of equipment, Initial preparation for task, Requires 2 people to assist  IADL:  TBA   Family Training/Education:  ongoing   DME/DC Recommendations:  Has equipment in place at home.      Strengths:  Able to follow instructions, Good insight into deficits/needs, Making steady progress towards goals, Motivated for self care and independence, Pleasant and cooperative and Willingly participates in therapeutic activities  Barriers:  Decreased endurance, Generalized weakness, Limited mobility, Poor activity tolerance and Poor family support     # of short term goals set= 5    # of short term goals met= 0, due to recent admit          Occupational Therapy Goals           Problem: Bathing     Dates: Start: 07/26/17       Goal: STG-Within one week, patient will bathe     Dates: Start: 07/26/17      Description: 1) Individualized Goal:  With mod I using AE and DME prn    2) Interventions:  OT Orthotics Training, OT E Stim Attended, OT Self Care/ADL, OT Cognitive Skill Dev, OT Community Reintegration, OT Manual Ther Technique, OT Neuro Re-Ed/Balance, OT Sensory Int Techniques, OT Therapeutic Activity, OT Evaluation and OT Therapeutic Exercise              Problem: Dressing     Dates: Start: 07/26/17       Goal: STG-Within one week, patient will dress LB     Dates: Start: 07/26/17      Description: 1) Individualized Goal: with supervision, v/c, and AE prn    2) Interventions:  OT Orthotics Training, OT E Stim Attended, OT Self Care/ADL, OT Cognitive Skill Dev, OT Community Reintegration, OT Manual Ther Technique, OT Neuro Re-Ed/Balance, OT Sensory Int Techniques, OT Therapeutic Activity, OT Evaluation and OT Therapeutic Exercise          Problem: Functional Transfers     Dates: Start: 07/26/17       Goal: STG-Within one week, patient will transfer to toilet     Dates: Start: 07/26/17      Description: 1) Individualized Goal:  With mod A using DME and v/c    2) Interventions:  OT Orthotics Training, OT E Stim Attended, OT Self Care/ADL, OT Cognitive Skill Dev, OT Community Reintegration, OT Manual Ther Technique, OT Neuro Re-Ed/Balance, OT Sensory Int Techniques, OT Therapeutic Activity, OT Evaluation and OT  Therapeutic Exercise        Goal: STG-Within one week, patient will transfer to step in shower     Dates: Start: 07/26/17      Description: 1) Individualized Goal:  With mod A using DME prn    2) Interventions:  OT Orthotics Training, OT E Stim Attended, OT Self Care/ADL, OT Cognitive Skill Dev, OT Community Reintegration, OT Manual Ther Technique, OT Neuro Re-Ed/Balance, OT Sensory Int Techniques, OT Therapeutic Activity, OT Evaluation and OT Therapeutic Exercise          Problem: OT Long Term Goals     Dates: Start: 07/27/17       Goal: LTG-By discharge, patient will complete basic self care tasks     Dates: Start: 07/27/17      Description: 1) Individualized Goal: modified independent at wheelchair level    2) Interventions:  OT Self Care/ADL and OT Neuro Re-Ed/Balance            Goal: LTG-By discharge, patient will perform bathroom transfers     Dates: Start: 07/27/17      Description: 1) Individualized Goal: modified independent wheelchair level    2) Interventions:  OT Self Care/ADL and OT Neuro Re-Ed/Balance            Goal: LTG-By discharge, patient will complete basic home management     Dates: Start: 07/27/17      Description: 1) Individualized Goal:  Modified independent at a wheelchair level.    2) Interventions:  OT Self Care/ADL, OT Community Reintegration, OT Neuro Re-Ed/Balance, OT Therapeutic Activity and OT Therapeutic Exercise              Problem: Toileting     Dates: Start: 07/26/17       Goal: STG-Within one week, patient will complete toileting tasks     Dates: Start: 07/26/17      Description: 1) Individualized Goal:  With supervision    2) Interventions:  OT Orthotics Training, OT E Stim Attended, OT Self Care/ADL, OT Cognitive Skill Dev, OT Community Reintegration, OT Manual Ther Technique, OT Neuro Re-Ed/Balance, OT Sensory Int Techniques, OT Therapeutic Activity, OT Evaluation and OT Therapeutic Exercise              Section completed by:  Claudia Paris MS,OTR/L              REHAB-Pharmacy IDT Team Note by Carroll Ricks RPH at 7/26/2017  3:36 PM  Version 1 of 1    Author:  Carroll Ricks RPH Service:  (none) Author Type:  Pharmacist    Filed:  7/26/2017  3:37 PM Note Time:  7/26/2017  3:36 PM Status:  Signed    :  Carroll Ricks RPH (Pharmacist)           Pharmacy  Pharmacy  Antibiotics/Antifungals/Antivirals:  Medication:      Active Orders     None        Route:         None  Stop Date:  N/A  Reason:   Antihypertensives/Cardiac:  Medication:    Orders     None        Patient Vitals for the past 24 hrs:   BP Pulse   07/26/17 1430 104/72 mmHg 86   07/26/17 1100 116/80 mmHg (!) 106   07/26/17 0831 115/72 mmHg (!) 112   07/26/17 0815 - 98   07/26/17 0645 138/74 mmHg 80   07/25/17 2117 140/90 mmHg 92   07/25/17 1840 145/89 mmHg 99   07/25/17 1828 137/87 mmHg 60       Anticoagulation:  Medication:  Not applicable  INR:      Other key medications:        A review of the medication list reveals no issues at this time.      Section completed by:  Carroll Ricks RPH           DC Planning  DC destination/dispostion:  Patient lives in a single level apartment.    Referrals: Possible PAS program    DC Needs: Patient has a tub bench, w/c, cushion and cane.  He may need a new w/c and new AFO's.  We have referred patient for evaluation with Joan and Nan.  I have discussed home health follow up and he is okay with this.    Barriers to discharge:   Decreasing function.    Strengths: very motivated.    Section completed by:  Ida Hays R.N.      Physician Summary  Margo Bruce MD participated and led team conference discussion.

## 2017-07-27 NOTE — REHAB-CM IDT TEAM NOTE
Case Management   DC Planning  DC destination/dispostion:  Patient lives in a single level apartment.    Referrals: Possible PAS program    DC Needs: Patient has a tub bench, w/c, cushion and cane.  He may need a new w/c and new AFO's.  We have referred patient for evaluation with Joan and Nan.  I have discussed home health follow up and he is okay with this.    Barriers to discharge:   Decreasing function.    Strengths: very motivated.    Section completed by:  Ida Hays R.N.

## 2017-07-27 NOTE — CARE PLAN
Problem: Bathing  Goal: STG-Within one week, patient will bathe  1) Individualized Goal: With mod I using AE and DME prn  2) Interventions: OT Orthotics Training, OT E Stim Attended, OT Self Care/ADL, OT Cognitive Skill Dev, OT Community Reintegration, OT Manual Ther Technique, OT Neuro Re-Ed/Balance, OT Sensory Int Techniques, OT Therapeutic Activity, OT Evaluation and OT Therapeutic Exercise  Outcome: NOT MET    Problem: Dressing  Goal: STG-Within one week, patient will dress LB  1) Individualized Goal: with supervision, v/c, and AE prn  2) Interventions: OT Orthotics Training, OT E Stim Attended, OT Self Care/ADL, OT Cognitive Skill Dev, OT Community Reintegration, OT Manual Ther Technique, OT Neuro Re-Ed/Balance, OT Sensory Int Techniques, OT Therapeutic Activity, OT Evaluation and OT Therapeutic Exercise   Outcome: PROGRESSING AS EXPECTED    Problem: Toileting  Goal: STG-Within one week, patient will complete toileting tasks  1) Individualized Goal: With supervision  2) Interventions: OT Orthotics Training, OT E Stim Attended, OT Self Care/ADL, OT Cognitive Skill Dev, OT Community Reintegration, OT Manual Ther Technique, OT Neuro Re-Ed/Balance, OT Sensory Int Techniques, OT Therapeutic Activity, OT Evaluation and OT Therapeutic Exercise   Outcome: PROGRESSING AS EXPECTED    Problem: Functional Transfers  Goal: STG-Within one week, patient will transfer to toilet  1) Individualized Goal: With mod A using DME and v/c  2) Interventions: OT Orthotics Training, OT E Stim Attended, OT Self Care/ADL, OT Cognitive Skill Dev, OT Community Reintegration, OT Manual Ther Technique, OT Neuro Re-Ed/Balance, OT Sensory Int Techniques, OT Therapeutic Activity, OT Evaluation and OT Therapeutic Exercise   Outcome: NOT MET  Goal: STG-Within one week, patient will transfer to step in shower  1) Individualized Goal: With mod A using DME prn  2) Interventions: OT Orthotics Training, OT E Stim Attended, OT Self Care/ADL, OT Cognitive  Skill Dev, OT Community Reintegration, OT Manual Ther Technique, OT Neuro Re-Ed/Balance, OT Sensory Int Techniques, OT Therapeutic Activity, OT Evaluation and OT Therapeutic Exercise   Outcome: NOT MET

## 2017-07-27 NOTE — THERAPY
Occupational Therapy Initial Plan of Care Note    1) Assessment:  Patient is 67 y.o. male with a diagnosis of paraplegia, debility, failure to thrive, leukopenia, chronic pain.  Additional factors influencing patient status / progress (ie: cognitive factors, co-morbidities, social support, etc): claustrophobia, anxiety, limited social support previously living independent with no family close by.  Long term and short term goals have been discussed with patient and they are in agreement.  2) Plan:  Recommend Occupational Therapy  minutes per day 5-7 days per week for 4-6  weeks for the following treatments:  OT Orthotics Training, OT E Stim Attended, OT Self Care/ADL, OT Cognitive Skill Dev, OT Community Reintegration, OT Manual Ther Technique, OT Neuro Re-Ed/Balance, OT Sensory Int Techniques, OT Therapeutic Activity, OT Evaluation and OT Therapeutic Exercise.  3) Goals:  Please refer to care plan for goals.

## 2017-07-27 NOTE — CARE PLAN
Problem: Safety  Goal: Will remain free from injury  Patient uses call light consistently and appropriately this shift.  Waits for assistance when needed and does not attempt self transfer.  Able to verbalize needs.  Will continue to monitor.    Problem: Pain Management  Goal: Pain level will decrease to patient’s comfort goal  No complaints of pain so far this shift, routine meds given per mar, will continue to assess and monitor.

## 2017-07-28 PROCEDURE — A9270 NON-COVERED ITEM OR SERVICE: HCPCS | Performed by: PHYSICAL MEDICINE & REHABILITATION

## 2017-07-28 PROCEDURE — 97110 THERAPEUTIC EXERCISES: CPT

## 2017-07-28 PROCEDURE — 700102 HCHG RX REV CODE 250 W/ 637 OVERRIDE(OP): Performed by: PHYSICAL MEDICINE & REHABILITATION

## 2017-07-28 PROCEDURE — 97535 SELF CARE MNGMENT TRAINING: CPT

## 2017-07-28 PROCEDURE — 97530 THERAPEUTIC ACTIVITIES: CPT

## 2017-07-28 PROCEDURE — 770010 HCHG ROOM/CARE - REHAB SEMI PRIVAT*

## 2017-07-28 PROCEDURE — 99232 SBSQ HOSP IP/OBS MODERATE 35: CPT | Performed by: PHYSICAL MEDICINE & REHABILITATION

## 2017-07-28 RX ADMIN — IBUPROFEN 800 MG: 400 TABLET ORAL at 14:51

## 2017-07-28 RX ADMIN — LORAZEPAM 1 MG: 1 TABLET ORAL at 21:13

## 2017-07-28 RX ADMIN — OXYCODONE HYDROCHLORIDE 5 MG: 5 TABLET ORAL at 18:14

## 2017-07-28 RX ADMIN — IBUPROFEN 800 MG: 400 TABLET ORAL at 08:37

## 2017-07-28 RX ADMIN — MIRTAZAPINE 15 MG: 15 TABLET, ORALLY DISINTEGRATING ORAL at 21:13

## 2017-07-28 RX ADMIN — IBUPROFEN 800 MG: 400 TABLET ORAL at 21:13

## 2017-07-28 ASSESSMENT — PAIN SCALES - GENERAL
PAINLEVEL_OUTOF10: 5
PAINLEVEL_OUTOF10: 3

## 2017-07-28 NOTE — CARE PLAN
Problem: Communication  Goal: The ability to communicate needs accurately and effectively will improve  Bernardino is alert and oriented x4  He is able to communicate his need accurately and effectively.

## 2017-07-28 NOTE — PROGRESS NOTES
"Rehab Progress Note     Chief complaint: follow up history of T9 spinal cord injury, debility/failure to thrive  Date of Service: 7/28/2017    Interval Events (Subjective)  Patient seen and examined. No acute events overnight. Patient seen during his PT session in the stander. Reports his heart is beating fast and wonders if that's OK. Denies any chest pain. Able to tolerate standing much longer in stander versus parallel bars. Slept good last night. No further issues with his bowels.    Objective:  VITAL SIGNS: /78 mmHg  Pulse 85  Temp(Src) 36.7 °C (98.1 °F)  Resp 18  Ht 1.93 m (6' 4\")  Wt 83.008 kg (183 lb)  BMI 22.28 kg/m2  SpO2 98%  Gen: alert, no apparent distress  CV: mild tachycardia, regular rhythm, no murmurs, no peripheral edema  Resp: clear to ascultation bilaterally, normal respiratory effort  GI: soft, non-tender abdomen, bowel sounds present  Neuro: notable for flaccid right leg with muscular atrophy, increased tone at the knee, left hip, knee, and ankle 4 out of 5, increased tone at the left knee, patchy sensory changes in the bilateral legs    Recent Results (from the past 72 hour(s))   Comp Metabolic Panel (CMP)    Collection Time: 07/26/17  5:40 AM   Result Value Ref Range    Sodium 133 (L) 135 - 145 mmol/L    Potassium 3.7 3.6 - 5.5 mmol/L    Chloride 106 96 - 112 mmol/L    Co2 20 20 - 33 mmol/L    Anion Gap 7.0 0.0 - 11.9    Glucose 72 65 - 99 mg/dL    Bun 7 (L) 8 - 22 mg/dL    Creatinine 0.49 (L) 0.50 - 1.40 mg/dL    Calcium 8.3 (L) 8.5 - 10.5 mg/dL    AST(SGOT) 23 12 - 45 U/L    ALT(SGPT) 13 2 - 50 U/L    Alkaline Phosphatase 42 30 - 99 U/L    Total Bilirubin 0.9 0.1 - 1.5 mg/dL    Albumin 2.4 (L) 3.2 - 4.9 g/dL    Total Protein 5.7 (L) 6.0 - 8.2 g/dL    Globulin 3.3 1.9 - 3.5 g/dL    A-G Ratio 0.7 g/dL   CBC with Differential    Collection Time: 07/26/17  5:40 AM   Result Value Ref Range    WBC 3.1 (L) 4.8 - 10.8 K/uL    RBC 3.85 (L) 4.70 - 6.10 M/uL    Hemoglobin 12.1 (L) 14.0 - " 18.0 g/dL    Hematocrit 34.5 (L) 42.0 - 52.0 %    MCV 89.6 81.4 - 97.8 fL    MCH 31.4 27.0 - 33.0 pg    MCHC 35.1 33.7 - 35.3 g/dL    RDW 38.4 35.9 - 50.0 fL    Platelet Count 162 (L) 164 - 446 K/uL    MPV 11.4 9.0 - 12.9 fL    Neutrophils-Polys 58.90 44.00 - 72.00 %    Lymphocytes 25.30 22.00 - 41.00 %    Monocytes 10.70 0.00 - 13.40 %    Eosinophils 3.90 0.00 - 6.90 %    Basophils 0.60 0.00 - 1.80 %    Immature Granulocytes 0.60 0.00 - 0.90 %    Nucleated RBC 0.00 /100 WBC    Neutrophils (Absolute) 1.81 (L) 1.82 - 7.42 K/uL    Lymphs (Absolute) 0.78 (L) 1.00 - 4.80 K/uL    Monos (Absolute) 0.33 0.00 - 0.85 K/uL    Eos (Absolute) 0.12 0.00 - 0.51 K/uL    Baso (Absolute) 0.02 0.00 - 0.12 K/uL    Immature Granulocytes (abs) 0.02 0.00 - 0.11 K/uL    NRBC (Absolute) 0.00 K/uL   Lipid Profile (Lipid Panel)    Collection Time: 07/26/17  5:40 AM   Result Value Ref Range    Cholesterol,Tot 87 (L) 100 - 199 mg/dL    Triglycerides 71 0 - 149 mg/dL    HDL 19 (A) >=40 mg/dL    LDL 54 <100 mg/dL   Magnesium    Collection Time: 07/26/17  5:40 AM   Result Value Ref Range    Magnesium 1.5 1.5 - 2.5 mg/dL   Phosphorus    Collection Time: 07/26/17  5:40 AM   Result Value Ref Range    Phosphorus 3.2 2.5 - 4.5 mg/dL   Prealbumin    Collection Time: 07/26/17  5:40 AM   Result Value Ref Range    Pre-Albumin <3.0 (L) 18.0 - 38.0 mg/dL   ESTIMATED GFR    Collection Time: 07/26/17  5:40 AM   Result Value Ref Range    GFR If African American >60 >60 mL/min/1.73 m 2    GFR If Non African American >60 >60 mL/min/1.73 m 2       Current Facility-Administered Medications   Medication Frequency   • ibuprofen (MOTRIN) tablet 800 mg TID   • oxycodone immediate-release (ROXICODONE) tablet 5 mg Q4HRS PRN   • baclofen (LIORESAL) tablet 10 mg QHS   • mirtazapine (REMERON) orally disintegrating tab 15 mg QHS   • senna-docusate (PERICOLACE or SENOKOT S) 8.6-50 MG per tablet 2 Tab QDAY PRN    And   • polyethylene glycol/lytes (MIRALAX) PACKET 1 Packet  QDAY PRN    And   • magnesium hydroxide (MILK OF MAGNESIA) suspension 30 mL QDAY PRN   • guaiFENesin (ROBITUSSIN) 100 MG/5ML solution 200 mg Q4HRS PRN   • Respiratory Care per Protocol Continuous RT   • Pharmacy Consult Request ...Pain Management Review 1 Each PRN   • tramadol (ULTRAM) 50 MG tablet 50 mg Q4HRS PRN   • acetaminophen (TYLENOL) tablet 650 mg Q4HRS PRN   • artificial tears 1.4 % ophthalmic solution 1 Drop PRN   • benzocaine-menthol (CEPACOL) lozenge 1 Lozenge Q2HRS PRN   • mag hydrox-al hydrox-simeth (MAALOX PLUS ES or MYLANTA DS) suspension 20 mL Q2HRS PRN   • trazodone (DESYREL) tablet 50 mg QHS PRN   • sodium chloride (OCEAN) 0.65 % nasal spray 2 Spray PRN   • ondansetron (ZOFRAN ODT) dispertab 4 mg Q4HRS PRN   • acetaminophen (TYLENOL) tablet 650 mg Q4HRS PRN   • lorazepam (ATIVAN) tablet 1 mg QHS       Orders Placed This Encounter   Procedures   • Diet Order     Standing Status: Standing      Number of Occurrences: 1      Standing Expiration Date:      Order Specific Question:  Diet:     Answer:  Regular [1]     Radiology  7/27/2017 9:35 AM    HISTORY/REASON FOR EXAM:  Atraumatic Pain/Swelling/Deformity.  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    TECHNIQUE/EXAM DESCRIPTION AND NUMBER OF VIEWS:  4 views of the RIGHT wrist.    COMPARISON: None    FINDINGS:  Deformity right wrist including partial resection proximal pole of the scaphoid and possible complete excision of the lunate.  The proximal segment of the capitate may articulate with the articulating surface of the radius.  Marked radial carpal and ulnar carpal arthritic changes demonstrated.  Marked arthritic changes first carpometacarpal joint.  No definite acute fracture.  Old fragments posterior soft tissues.  Soft tissue swelling.    Mild osteopenia.         Impression        Postoperative changes right wrist including partial resection of the scaphoid and possible complete excision of the lunate.  Question articulation  of the capitate with the articulating surface of the radius.  Marked arthritic changes. No acute fracture identified.  Soft tissue swelling and old fragments within the dorsal soft tissues.     Assessment:  Active Hospital Problems    Diagnosis   • Debility   • Wrist pain   • Back pain   • Paraplegia (CMS-HCC)   • Chronic pain   • Depression   • Poor appetite   • T9 spinal cord injury (CMS-HCC)   • Failure to thrive (0-17)   • Leukopenia     I led and attended the weekly conference, and agree with the IDT conference documentation and plan of care as noted below.    Date of conference: 7/27/2017    CM/social support: DC destination/dispostion:  Patient lives in a single level apartment.    Referrals: Possible PAS program    DC Needs: Patient has a tub bench, w/c, cushion and cane.  He may need a new w/c and new AFO's.  We have referred patient for evaluation with Joan and Nan.  I have discussed home health follow up and he is okay with this.    Barriers to discharge:   Decreasing function.    Strengths: very motivated.    Anticipated DC date: 8/22/2017    Home health: PT/OT/RN    Equip: 2 new AFOs, new wheelchair, transfer pole in house    Follow up: PCP, SCI clinic    Therapy update 7/28/2017  PT - Moderate assist to  the parallel bars for 3 minutes  Pt demos improved upright stance and standing tolerance in // bars compared to  yesterday.   Orthotist to return Monday for bilateral AFO casting  standing frame x11:45 minutes to facilitate WB through B LEs and upright  posture.   OT - Toilet tx with Mod to Max A x1 with 2nd person for safety. Pt used  grab bars and performed squat-pivot tx wc<>toilet with poor eccentric control  when sitting. Outside wc mob with CGA on uneven surfaces.    Medical Decision Making and Plan:    Labs reviewed. Medications reviewed.    T9 incomplete spinal cord injury -with no movement of right leg, 4/5 left leg, increased tone at knees, patchy sensation in both legs.  Continue therapy. Being evaluated for new bilateral AFOs and new wheelchair.    Debility/Failure to thrive - multifactorial. Continue full rehab program.    Pancytopenia - due to poor nutritional intake. Dietician to see patient.     Alcohol overuse - likely using alcohol for his anxiety. This also is the likely reason for his pancytopenia. Dr. Dutton to consult. One week since his last drink. Re-admitted to [facility] on 7/22. Monitor for signs of withdrawal.    Spasticity versus contractures - assess with therapy. Scheduled baclofen at night. Monitor.    Bilateral wrist pain - continue splint on left. Xray right with severe OA, soft tissue swelling, and evidence of old surgeries. 4 days of motrin. Ice as needed.    Depression/anxiety - started Remeron. Dr. Dutton consult.    Hypoalbuminemia/Poor appetite - likely due to above. Starting Remeron.    Insomnia - continue ativan at night which patient states helps his sleep. Trazodone as needed.    Cough - guaifenesin PRN. Incentive spirometry.    Hyponatremia/recent hypokalemia - likely due to poor nutrition. Recheck labs on Monday.    Bowel incontinence - unclear etiology. Monitor. Patient reports baclofen caused this prior?    DVT prophylaxis - not indicated as patient not a new paraplegic.      Total time:  >25 minutes.  I spent greater than 50% of the time for patient care and coordination on this date, including unit/floor time, and face-to-face time with the patient as per assessment and plan above.    Margo Bruce M.D.

## 2017-07-28 NOTE — CARE PLAN
Problem: Communication  Goal: The ability to communicate needs accurately and effectively will improve  Makes needs known to staff.  Encouraged to use call light for staff assist.    Problem: Safety  Goal: Will remain free from falls  Call light kept within reach and encouraged to use for assistance.  Frequent checks made.      Problem: Pain Management  Goal: Pain level will decrease to patient’s comfort goal  Complains of pain to back and bilateral wrists.  Has Motrin scheduled three times a day. See doc flow sheet.  Will continue to monitor patient.

## 2017-07-28 NOTE — DISCHARGE PLANNING
Case management;  Met with patient and therapist and reviewed team conference discussion.  Will follow.

## 2017-07-28 NOTE — PROGRESS NOTES
Received shift report and assumed care of patient. Patient awake, calm and stable, currently positioned in bed for comfort and safety; call light within reach. POC discussed. Denies pain or discomfort at this time.

## 2017-07-28 NOTE — PROGRESS NOTES
Received bedside shift report from Penelope BRAVO RN regarding patient and assumed care. Patient awake, calm and stable, currently positioned in bed for comfort and safety; call light within reach. Denies pain or discomfort at this time. Will continue to monitor.

## 2017-07-29 PROCEDURE — 770010 HCHG ROOM/CARE - REHAB SEMI PRIVAT*

## 2017-07-29 PROCEDURE — 700102 HCHG RX REV CODE 250 W/ 637 OVERRIDE(OP): Performed by: PHYSICAL MEDICINE & REHABILITATION

## 2017-07-29 PROCEDURE — 97530 THERAPEUTIC ACTIVITIES: CPT

## 2017-07-29 PROCEDURE — 97535 SELF CARE MNGMENT TRAINING: CPT

## 2017-07-29 PROCEDURE — A9270 NON-COVERED ITEM OR SERVICE: HCPCS | Performed by: PHYSICAL MEDICINE & REHABILITATION

## 2017-07-29 PROCEDURE — 97110 THERAPEUTIC EXERCISES: CPT

## 2017-07-29 RX ADMIN — IBUPROFEN 800 MG: 400 TABLET ORAL at 15:17

## 2017-07-29 RX ADMIN — DOCUSATE SODIUM AND SENNOSIDES 2 TABLET: 8.6; 5 TABLET, FILM COATED ORAL at 15:17

## 2017-07-29 RX ADMIN — LORAZEPAM 1 MG: 1 TABLET ORAL at 20:28

## 2017-07-29 RX ADMIN — BACLOFEN 10 MG: 10 TABLET ORAL at 20:28

## 2017-07-29 RX ADMIN — IBUPROFEN 800 MG: 400 TABLET ORAL at 20:28

## 2017-07-29 RX ADMIN — IBUPROFEN 800 MG: 400 TABLET ORAL at 09:36

## 2017-07-29 RX ADMIN — MIRTAZAPINE 15 MG: 15 TABLET, ORALLY DISINTEGRATING ORAL at 20:28

## 2017-07-29 ASSESSMENT — PAIN SCALES - GENERAL
PAINLEVEL_OUTOF10: 0
PAINLEVEL_OUTOF10: 0

## 2017-07-29 NOTE — CARE PLAN
Problem: Other Problem (see comments)  Goal: Other Goal  Monitor/Evaluation: Monitor PO intake, weight, labs, medication adjustments, skin integrity, GI function, vitals, I/Os, and overall hydration status. Adjust nutritional POC pending clinical outcomes.   RD following weekly.   Goal: Maintain adequate oral nutrient/fluid intake to promote nutrition optimization/healing/ resolution of malnutrition.   Outcome: PROGRESSING AS EXPECTED
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
## 2017-07-29 NOTE — CARE PLAN
Problem: Communication  Goal: The ability to communicate needs accurately and effectively will improve  Patient is alert and oriented x 4.     Problem: Bowel/Gastric:  Goal: Normal bowel function is maintained or improved  Patient has good bowel sounds, active in all quads.  Patient has not had a BM in 3 days. Sonia-colace 2 tabs given per prn order.  Patient refused Miralax or Milk of magnesia at this time.     Problem: Pain Management  Goal: Pain level will decrease to patient’s comfort goal  Patient has denied pain or discomfort at this time.

## 2017-07-29 NOTE — PROGRESS NOTES
Endorsed by RN. Assumed care. Pt in bed resting. V/s stable. Will continue to monitor.    A/o x4. Able to communicate needs. No s/s of respiratory distress noted. Compliant with prescribed HS meds without s/s of adverse reaction noted. Pt stated his appetite improved since he started taking Remeron and was happy with effect. R wrist edema still present, pain medication given as scheduled. Safety precautions in place. Call light within reach.

## 2017-07-29 NOTE — CARE PLAN
"Problem: Infection  Goal: Will remain free from infection  Outcome: PROGRESSING SLOWER THAN EXPECTED  Assessed R forearm skin tear dressing, no s/s of infection noted. Irrigated with NS and changed biatain dressing.    Problem: Pain Management  Goal: Pain level will decrease to patient’s comfort goal  Outcome: PROGRESSING AS EXPECTED  Administered Ibuprofen as scheduled for 3/10 wrist and back pain, will continue to monitor. Pt refused HS scheduled Baclofen. \"I will skip the Baclofen tonight.\" as verbalized by pt.        "
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
## 2017-07-30 PROCEDURE — 700102 HCHG RX REV CODE 250 W/ 637 OVERRIDE(OP): Performed by: PHYSICAL MEDICINE & REHABILITATION

## 2017-07-30 PROCEDURE — A9270 NON-COVERED ITEM OR SERVICE: HCPCS | Performed by: PHYSICAL MEDICINE & REHABILITATION

## 2017-07-30 PROCEDURE — 770010 HCHG ROOM/CARE - REHAB SEMI PRIVAT*

## 2017-07-30 RX ORDER — POLYETHYLENE GLYCOL 3350 17 G/17G
1 POWDER, FOR SOLUTION ORAL DAILY
Status: DISCONTINUED | OUTPATIENT
Start: 2017-07-30 | End: 2017-08-26 | Stop reason: HOSPADM

## 2017-07-30 RX ORDER — BISACODYL 10 MG
10 SUPPOSITORY, RECTAL RECTAL
Status: DISCONTINUED | OUTPATIENT
Start: 2017-07-30 | End: 2017-08-26 | Stop reason: HOSPADM

## 2017-07-30 RX ORDER — LACTULOSE 20 G/30ML
30 SOLUTION ORAL
Status: DISCONTINUED | OUTPATIENT
Start: 2017-07-30 | End: 2017-08-26 | Stop reason: HOSPADM

## 2017-07-30 RX ORDER — AMOXICILLIN 250 MG
2 CAPSULE ORAL EVERY EVENING
Status: DISCONTINUED | OUTPATIENT
Start: 2017-07-30 | End: 2017-08-26 | Stop reason: HOSPADM

## 2017-07-30 RX ADMIN — IBUPROFEN 800 MG: 400 TABLET ORAL at 20:14

## 2017-07-30 RX ADMIN — BACLOFEN 10 MG: 10 TABLET ORAL at 20:14

## 2017-07-30 RX ADMIN — MAGNESIUM HYDROXIDE 30 ML: 400 SUSPENSION ORAL at 05:16

## 2017-07-30 RX ADMIN — LORAZEPAM 1 MG: 1 TABLET ORAL at 20:14

## 2017-07-30 RX ADMIN — MIRTAZAPINE 15 MG: 15 TABLET, ORALLY DISINTEGRATING ORAL at 20:14

## 2017-07-30 RX ADMIN — LACTULOSE 30 ML: 20 SOLUTION ORAL at 10:16

## 2017-07-30 RX ADMIN — IBUPROFEN 800 MG: 400 TABLET ORAL at 14:28

## 2017-07-30 RX ADMIN — IBUPROFEN 800 MG: 400 TABLET ORAL at 08:39

## 2017-07-30 ASSESSMENT — PAIN SCALES - GENERAL: PAINLEVEL_OUTOF10: 0

## 2017-07-30 NOTE — CARE PLAN
Problem: Bowel/Gastric:  Goal: Normal bowel function is maintained or improved  Lactulose given per prn order.   Large results noted.   Sonia care done.     Problem: Pain Management  Goal: Pain level will decrease to patient’s comfort goal  Patient denies pain or discomfort at this time.

## 2017-07-30 NOTE — CARE PLAN
Problem: Communication  Goal: The ability to communicate needs accurately and effectively will improve  Intervention: [place] patient and significant other/support system to call light to alert staff of needs  Reoriented pt with the use of call light and waiting for assistance before getting out of bed, compliant.      Problem: Respiratory:  Goal: Respiratory status will improve  Intervention: Assess and monitor pulmonary status  Clear lung sounds noted on both lobes. No s/s of respiratory distress noted.

## 2017-07-30 NOTE — PROGRESS NOTES
Endorsed by RN. Assumed care. Pt in bed resting. Encouraged to put pull up briefs on d/t bowel incontinence, effective. Denied any pain/discomfort. Will continue to monitor.    A/o x4. Able to communicate needs. Compliant with all prescribed HS meds without s/s of adverse reaction noted. On Lovenox without s/s of active bleeding noted. R wrist edema present. Ibuprofen given as scheduled. All safety precautions in place. Call light within reach.    @0530, administered MOM as per BM protocol. Pt's last BM was 07/26. Waiting for result.

## 2017-07-31 LAB
ANION GAP SERPL CALC-SCNC: 6 MMOL/L (ref 0–11.9)
BUN SERPL-MCNC: 10 MG/DL (ref 8–22)
CALCIUM SERPL-MCNC: 8.4 MG/DL (ref 8.5–10.5)
CHLORIDE SERPL-SCNC: 108 MMOL/L (ref 96–112)
CO2 SERPL-SCNC: 23 MMOL/L (ref 20–33)
CREAT SERPL-MCNC: 0.6 MG/DL (ref 0.5–1.4)
GFR SERPL CREATININE-BSD FRML MDRD: >60 ML/MIN/1.73 M 2
GLUCOSE SERPL-MCNC: 81 MG/DL (ref 65–99)
POTASSIUM SERPL-SCNC: 3.3 MMOL/L (ref 3.6–5.5)
SODIUM SERPL-SCNC: 137 MMOL/L (ref 135–145)

## 2017-07-31 PROCEDURE — A9270 NON-COVERED ITEM OR SERVICE: HCPCS | Performed by: PHYSICAL MEDICINE & REHABILITATION

## 2017-07-31 PROCEDURE — 97530 THERAPEUTIC ACTIVITIES: CPT

## 2017-07-31 PROCEDURE — 700102 HCHG RX REV CODE 250 W/ 637 OVERRIDE(OP): Performed by: PHYSICAL MEDICINE & REHABILITATION

## 2017-07-31 PROCEDURE — 97110 THERAPEUTIC EXERCISES: CPT

## 2017-07-31 PROCEDURE — 770010 HCHG ROOM/CARE - REHAB SEMI PRIVAT*

## 2017-07-31 PROCEDURE — 36415 COLL VENOUS BLD VENIPUNCTURE: CPT

## 2017-07-31 PROCEDURE — 80048 BASIC METABOLIC PNL TOTAL CA: CPT

## 2017-07-31 PROCEDURE — 97535 SELF CARE MNGMENT TRAINING: CPT

## 2017-07-31 PROCEDURE — 99232 SBSQ HOSP IP/OBS MODERATE 35: CPT | Performed by: PHYSICAL MEDICINE & REHABILITATION

## 2017-07-31 RX ORDER — POTASSIUM CHLORIDE 20 MEQ/1
20 TABLET, EXTENDED RELEASE ORAL DAILY
Status: DISCONTINUED | OUTPATIENT
Start: 2017-07-31 | End: 2017-08-07

## 2017-07-31 RX ADMIN — BACLOFEN 10 MG: 10 TABLET ORAL at 20:08

## 2017-07-31 RX ADMIN — LORAZEPAM 1 MG: 1 TABLET ORAL at 20:07

## 2017-07-31 RX ADMIN — Medication 2 TABLET: at 20:08

## 2017-07-31 RX ADMIN — POTASSIUM CHLORIDE 20 MEQ: 1500 TABLET, EXTENDED RELEASE ORAL at 11:00

## 2017-07-31 RX ADMIN — IBUPROFEN 800 MG: 400 TABLET ORAL at 08:18

## 2017-07-31 RX ADMIN — MIRTAZAPINE 15 MG: 15 TABLET, ORALLY DISINTEGRATING ORAL at 20:08

## 2017-07-31 ASSESSMENT — PAIN SCALES - GENERAL
PAINLEVEL_OUTOF10: 0
PAINLEVEL_OUTOF10: 0

## 2017-07-31 NOTE — PROGRESS NOTES
Endorsed by RN. Assumed care. Pt in bed watching tv. C/o increased bowel motility, all four quads hyperactive. Pt had BMx2 in 4hrs. Protocol BM meds given in the morning d/t 4 days without bowel output. Will continue to monitor.    A/o x4 able to communicate needs. O2 sat of 97% on RA without s/s of respiratory distress noted. Compliant with all prescribed HS meds without s/s of adverse reaction noted. Advised pt to skip the icecream tonight d/t uncontrolled BM today. RFA skin tear dressing intact. Safety precautions in place. Call light within reach.

## 2017-07-31 NOTE — CARE PLAN
Problem: Venous Thromboembolism (VTW)/Deep Vein Thrombosis (DVT) Prevention:  Goal: Patient will participate in Venous Thrombosis (VTE)/Deep Vein Thrombosis (DVT)Prevention Measures  Intervention: Ensure patient wears graduated elastic stockings (OSVALDO hose) and/or SCDs, if ordered, when in bed or chair (Remove at least once per shift for skin check)  Elastic stocking taken off after dinner after pt went back to bed. Pedal pulse palpable. Denied any pain/discomfort.      Problem: Bowel/Gastric:  Goal: Normal bowel function is maintained or improved  Outcome: PROGRESSING SLOWER THAN EXPECTED  Held prescribed HS stool softener. Pt had watery BM after dinner. Denied abdominal pain/discomfort at this time.

## 2017-08-01 ENCOUNTER — PATIENT OUTREACH (OUTPATIENT)
Dept: HEALTH INFORMATION MANAGEMENT | Facility: OTHER | Age: 67
End: 2017-08-01

## 2017-08-01 PROCEDURE — 97530 THERAPEUTIC ACTIVITIES: CPT

## 2017-08-01 PROCEDURE — 97110 THERAPEUTIC EXERCISES: CPT

## 2017-08-01 PROCEDURE — 97535 SELF CARE MNGMENT TRAINING: CPT

## 2017-08-01 PROCEDURE — 700102 HCHG RX REV CODE 250 W/ 637 OVERRIDE(OP): Performed by: PHYSICAL MEDICINE & REHABILITATION

## 2017-08-01 PROCEDURE — 770010 HCHG ROOM/CARE - REHAB SEMI PRIVAT*

## 2017-08-01 PROCEDURE — 99232 SBSQ HOSP IP/OBS MODERATE 35: CPT | Performed by: PHYSICAL MEDICINE & REHABILITATION

## 2017-08-01 PROCEDURE — A9270 NON-COVERED ITEM OR SERVICE: HCPCS | Performed by: PHYSICAL MEDICINE & REHABILITATION

## 2017-08-01 RX ADMIN — LACTULOSE 30 ML: 20 SOLUTION ORAL at 18:14

## 2017-08-01 RX ADMIN — Medication 2 TABLET: at 19:37

## 2017-08-01 RX ADMIN — BACLOFEN 10 MG: 10 TABLET ORAL at 19:37

## 2017-08-01 RX ADMIN — POLYETHYLENE GLYCOL 3350 1 PACKET: 17 POWDER, FOR SOLUTION ORAL at 09:53

## 2017-08-01 RX ADMIN — LORAZEPAM 1 MG: 1 TABLET ORAL at 19:37

## 2017-08-01 RX ADMIN — MIRTAZAPINE 15 MG: 15 TABLET, ORALLY DISINTEGRATING ORAL at 19:37

## 2017-08-01 RX ADMIN — POTASSIUM CHLORIDE 20 MEQ: 1500 TABLET, EXTENDED RELEASE ORAL at 09:48

## 2017-08-01 ASSESSMENT — PAIN SCALES - GENERAL: PAINLEVEL_OUTOF10: 0

## 2017-08-01 NOTE — PROGRESS NOTES
Patient Bernardino Hawthorne was admitted on 7/17/17 at [facility] and discharged on 7/20/17 with a diagnosis of low back pain. See all appointment details below. The patient had no new DME orders. The patient discharged with a referral for [facility] services that were to begin on 7/22/17. Home Health start-of-care was cancelled due to the [facility] nurse sending the patient to the ED on 7/22/17. After initial contact, it was confirmed the [facility].   [facility] - Cancelled, patient readmitted to ED   - JESSE Pan 7/25/17 - Cancelled, patient admitted to [facility]

## 2017-08-01 NOTE — CARE PLAN
Problem: Bowel/Gastric:  Goal: Normal bowel function is maintained or improved  Outcome: PROGRESSING SLOWER THAN EXPECTED  States that he feels a little tired from all the prn bowel meds that he took yesterday. He had a few very large BMs after the laxatives and does feel better after the constipation resolved.    Problem: Pain Management  Goal: Pain level will decrease to patient’s comfort goal  Outcome: PROGRESSING SLOWER THAN EXPECTED  No complaints of pain but does state he has a general fatique later in the day.

## 2017-08-01 NOTE — PROGRESS NOTES
"Rehab Progress Note     Chief complaint: follow up history of T9 spinal cord injury, debility/failure to thrive  Date of Service: 8/1/2017    Interval Events (Subjective)  Patient seen and examined. No acute events overnight. Patient doing well with therapy. Was able to do a transfer this morning at contact guard to standby assist which is a great improvement compared to admission. Patient tells me he hasn't really walked in many years, but about a month ago was able to independently transfer which is his goal prior to discharge from here.  Joan is coming in today to assess him for wheelchair modifications and/or new wheelchair. He was fitted for bilateral orthotics yesterday. He really is making great improvements overall with his strength and mobility. We discussed what he will do for his anxiety and depression at home and how he can keep from drinking too much alcohol for mood control. Patient understands that alcohol makes depression worse.    Objective:  VITAL SIGNS: /62 mmHg  Pulse 77  Temp(Src) 36.8 °C (98.3 °F)  Resp 18  Ht 1.93 m (6' 4\")  Wt 83 kg (182 lb 15.7 oz)  BMI 22.28 kg/m2  SpO2 96%  Gen: alert, no apparent distress  CV: mild tachycardia, regular rhythm, no murmurs, no peripheral edema  Resp: clear to ascultation bilaterally, normal respiratory effort  GI: soft, non-tender abdomen, bowel sounds present  Neuro: notable for flaccid right leg with muscular atrophy, increased tone at the knee, left hip, knee, and ankle 4 out of 5, increased tone at the left knee, patchy sensory changes in the bilateral legs    Recent Results (from the past 72 hour(s))   BASIC METABOLIC PANEL    Collection Time: 07/31/17  5:14 AM   Result Value Ref Range    Sodium 137 135 - 145 mmol/L    Potassium 3.3 (L) 3.6 - 5.5 mmol/L    Chloride 108 96 - 112 mmol/L    Co2 23 20 - 33 mmol/L    Glucose 81 65 - 99 mg/dL    Bun 10 8 - 22 mg/dL    Creatinine 0.60 0.50 - 1.40 mg/dL    Calcium 8.4 (L) 8.5 - 10.5 mg/dL    " Anion Gap 6.0 0.0 - 11.9   ESTIMATED GFR    Collection Time: 07/31/17  5:14 AM   Result Value Ref Range    GFR If African American >60 >60 mL/min/1.73 m 2    GFR If Non African American >60 >60 mL/min/1.73 m 2       Current Facility-Administered Medications   Medication Frequency   • potassium chloride SA (Kdur) tablet 20 mEq DAILY   • senna-docusate (PERICOLACE or SENOKOT S) 8.6-50 MG per tablet 2 Tab Q EVENING    And   • polyethylene glycol/lytes (MIRALAX) PACKET 1 Packet DAILY    And   • magnesium hydroxide (MILK OF MAGNESIA) suspension 30 mL QDAY PRN   • lactulose 20 GM/30ML solution 30 mL QDAY PRN   • bisacodyl (DULCOLAX) suppository 10 mg QDAY PRN   • oxycodone immediate-release (ROXICODONE) tablet 5 mg Q4HRS PRN   • baclofen (LIORESAL) tablet 10 mg QHS   • mirtazapine (REMERON) orally disintegrating tab 15 mg QHS   • guaiFENesin (ROBITUSSIN) 100 MG/5ML solution 200 mg Q4HRS PRN   • Respiratory Care per Protocol Continuous RT   • Pharmacy Consult Request ...Pain Management Review 1 Each PRN   • tramadol (ULTRAM) 50 MG tablet 50 mg Q4HRS PRN   • acetaminophen (TYLENOL) tablet 650 mg Q4HRS PRN   • artificial tears 1.4 % ophthalmic solution 1 Drop PRN   • benzocaine-menthol (CEPACOL) lozenge 1 Lozenge Q2HRS PRN   • mag hydrox-al hydrox-simeth (MAALOX PLUS ES or MYLANTA DS) suspension 20 mL Q2HRS PRN   • trazodone (DESYREL) tablet 50 mg QHS PRN   • sodium chloride (OCEAN) 0.65 % nasal spray 2 Spray PRN   • ondansetron (ZOFRAN ODT) dispertab 4 mg Q4HRS PRN   • acetaminophen (TYLENOL) tablet 650 mg Q4HRS PRN   • lorazepam (ATIVAN) tablet 1 mg QHS       Orders Placed This Encounter   Procedures   • Diet Order     Standing Status: Standing      Number of Occurrences: 1      Standing Expiration Date:      Order Specific Question:  Diet:     Answer:  Regular [1]         Assessment:  Active Hospital Problems    Diagnosis   • Debility   • Wrist pain   • Back pain   • Paraplegia (CMS-HCC)   • Chronic pain   • Depression    • Poor appetite   • T9 spinal cord injury (CMS-HCC)   • Failure to thrive (0-17)   • Leukopenia     I led and attended the weekly conference, and agree with the IDT conference documentation and plan of care as noted below.    Date of conference: 7/27/2017    CM/social support: DC destination/dispostion:  Patient lives in a single level apartment.    Referrals: Possible PAS program    DC Needs: Patient has a tub bench, w/c, cushion and cane.  He may need a new w/c and new AFO's.  We have referred patient for evaluation with Joan and Nan.  I have discussed home health follow up and he is okay with this.    Barriers to discharge:   Decreasing function.    Strengths: very motivated.    Anticipated DC date: 8/22/2017    Home health: PT/OT/RN    Equip: 2 new AFOs, new wheelchair, transfer pole in house    Follow up: PCP, SCI clinic    Medical Decision Making and Plan:    Labs reviewed. Medications reviewed.    T9 incomplete spinal cord injury -with no movement of right leg, 4/5 left leg, increased tone at knees, patchy sensation in both legs. Continue therapies. Being evaluated for new bilateral AFOs and new wheelchair.     Debility/Failure to thrive - multifactorial. Continue full rehab program.    Pancytopenia - due to poor nutritional intake. Dietician to see patient.     Alcohol overuse - likely using alcohol for his anxiety. Dr. Dutton to consult.     Spasticity - Scheduled baclofen at night. Monitor.    Bilateral wrist pain - continue splint on left, for severe OA/ligament tear. Xray right with severe OA, soft tissue swelling, and evidence of old surgeries. S/p 4 days of motrin. Ice as needed. Improved.    Depression/anxiety - continue Remeron. Dr. Dutton consult.    Hypoalbuminemia/Poor appetite - likely due to above. Continue Remeron.    Insomnia - continue ativan at night which patient states helps his sleep. Trazodone as needed.    Cough - guaifenesin PRN. Incentive spirometry.    Hyponatremia/recent  hypokalemia - likely due to poor nutrition. Start potassium supplementation. Monitor.    Bowel incontinence - once on admission, unclear etiology. Monitor. Some constipation, then with loose stools due to bowel meds.    DVT prophylaxis - not indicated as patient not a new paraplegic.      Total time:  >25 minutes.  I spent greater than 50% of the time for patient care and coordination on this date, including unit/floor time, and face-to-face time with the patient as per assessment and plan above.    Margo Bruce M.D.

## 2017-08-01 NOTE — PROGRESS NOTES
"Rehab Progress Note     Chief complaint: follow up history of T9 spinal cord injury, debility/failure to thrive  Date of Service: 7/31/2017    Interval Events (Subjective)  Patient seen and examined. No acute events overnight. Patient is making good improvements with therapy. Physical therapy reports a min assist for transfer today. Patient agrees he will wait until his transfers are stronger before attempting to strengths for into his light weight wheelchair which does not have handles. Patient was constipated and then had loose stools due to the laxatives and stool softeners. He understands his potassium is low and needs repletion. He was fitted for custom made bilateral ankle-foot orthotics today. Patient reports the baclofen at night is controlling his spasms. He is not having any spasms during the day. Pain in both of his wrists is much improved as well as the swelling. Mood and appetite also seems improved. Overall he is inching forward. His goals continue to be to independently transfer in his home. He does not have community outing goals thus far.    Objective:  VITAL SIGNS: /74 mmHg  Pulse 83  Temp(Src) 36.6 °C (97.8 °F)  Resp 18  Ht 1.93 m (6' 4\")  Wt 83 kg (182 lb 15.7 oz)  BMI 22.28 kg/m2  SpO2 98%  Gen: alert, no apparent distress  CV: mild tachycardia, regular rhythm, no murmurs, no peripheral edema  Resp: clear to ascultation bilaterally, normal respiratory effort  GI: soft, non-tender abdomen, bowel sounds present  Neuro: notable for flaccid right leg with muscular atrophy, increased tone at the knee, left hip, knee, and ankle 4 out of 5, increased tone at the left knee, patchy sensory changes in the bilateral legs    Recent Results (from the past 72 hour(s))   BASIC METABOLIC PANEL    Collection Time: 07/31/17  5:14 AM   Result Value Ref Range    Sodium 137 135 - 145 mmol/L    Potassium 3.3 (L) 3.6 - 5.5 mmol/L    Chloride 108 96 - 112 mmol/L    Co2 23 20 - 33 mmol/L    Glucose 81 65 - " 99 mg/dL    Bun 10 8 - 22 mg/dL    Creatinine 0.60 0.50 - 1.40 mg/dL    Calcium 8.4 (L) 8.5 - 10.5 mg/dL    Anion Gap 6.0 0.0 - 11.9   ESTIMATED GFR    Collection Time: 07/31/17  5:14 AM   Result Value Ref Range    GFR If African American >60 >60 mL/min/1.73 m 2    GFR If Non African American >60 >60 mL/min/1.73 m 2       Current Facility-Administered Medications   Medication Frequency   • potassium chloride SA (Kdur) tablet 20 mEq DAILY   • senna-docusate (PERICOLACE or SENOKOT S) 8.6-50 MG per tablet 2 Tab Q EVENING    And   • polyethylene glycol/lytes (MIRALAX) PACKET 1 Packet DAILY    And   • magnesium hydroxide (MILK OF MAGNESIA) suspension 30 mL QDAY PRN   • lactulose 20 GM/30ML solution 30 mL QDAY PRN   • bisacodyl (DULCOLAX) suppository 10 mg QDAY PRN   • oxycodone immediate-release (ROXICODONE) tablet 5 mg Q4HRS PRN   • baclofen (LIORESAL) tablet 10 mg QHS   • mirtazapine (REMERON) orally disintegrating tab 15 mg QHS   • guaiFENesin (ROBITUSSIN) 100 MG/5ML solution 200 mg Q4HRS PRN   • Respiratory Care per Protocol Continuous RT   • Pharmacy Consult Request ...Pain Management Review 1 Each PRN   • tramadol (ULTRAM) 50 MG tablet 50 mg Q4HRS PRN   • acetaminophen (TYLENOL) tablet 650 mg Q4HRS PRN   • artificial tears 1.4 % ophthalmic solution 1 Drop PRN   • benzocaine-menthol (CEPACOL) lozenge 1 Lozenge Q2HRS PRN   • mag hydrox-al hydrox-simeth (MAALOX PLUS ES or MYLANTA DS) suspension 20 mL Q2HRS PRN   • trazodone (DESYREL) tablet 50 mg QHS PRN   • sodium chloride (OCEAN) 0.65 % nasal spray 2 Spray PRN   • ondansetron (ZOFRAN ODT) dispertab 4 mg Q4HRS PRN   • acetaminophen (TYLENOL) tablet 650 mg Q4HRS PRN   • lorazepam (ATIVAN) tablet 1 mg QHS       Orders Placed This Encounter   Procedures   • Diet Order     Standing Status: Standing      Number of Occurrences: 1      Standing Expiration Date:      Order Specific Question:  Diet:     Answer:  Regular [1]         Assessment:  Active Hospital Problems     Diagnosis   • Debility   • Wrist pain   • Back pain   • Paraplegia (CMS-HCC)   • Chronic pain   • Depression   • Poor appetite   • T9 spinal cord injury (CMS-HCC)   • Failure to thrive (0-17)   • Leukopenia     I led and attended the weekly conference, and agree with the IDT conference documentation and plan of care as noted below.    Date of conference: 7/27/2017    CM/social support: DC destination/dispostion:  Patient lives in a single level apartment.    Referrals: Possible PAS program    DC Needs: Patient has a tub bench, w/c, cushion and cane.  He may need a new w/c and new AFO's.  We have referred patient for evaluation with Joan and Nan.  I have discussed home health follow up and he is okay with this.    Barriers to discharge:   Decreasing function.    Strengths: very motivated.    Anticipated DC date: 8/22/2017    Home health: PT/OT/RN    Equip: 2 new AFOs, new wheelchair, transfer pole in house    Follow up: PCP, SCI clinic    Therapy update 7/31/2017  PT - tx focused on functional transfers: bed<>wc transfer from std wc with  armrests, using bedrail, squat-pivot with Min A and VCs for setup. Sit<>supine  with Mod I. Same transfer from pt's personal lightweight wc with brakes and  NO armrests: squat-pivot wc->EOB with Mod A and pt with posterolateral seated  LOB when at EOB. Pt unable to place feet in ideal position due to fixed  footplate. Pt returned to sitting with CGA. Discussed wc modifications to  improve transfers and allow for ease in mobility, pt receptive to suggestions.  Meeting with Joan scheduled for tomorrow. Pt propelled personal  lightweight wc >450' around building with Mod I. Stance in // bars with CGA x2  min to switch wheelchairs. Discussed option of power assist wc propulsion.    Medical Decision Making and Plan:    Labs reviewed. Medications reviewed.    T9 incomplete spinal cord injury -with no movement of right leg, 4/5 left leg, increased tone at knees, patchy  sensation in both legs. Continue therapies. Being evaluated for new bilateral AFOs and new wheelchair.     Debility/Failure to thrive - multifactorial. Continue full rehab program.    Pancytopenia - due to poor nutritional intake. Dietician to see patient.     Alcohol overuse - likely using alcohol for his anxiety. Dr. Dutton to consult.     Spasticity - Scheduled baclofen at night. Monitor.    Bilateral wrist pain - continue splint on left, for severe OA/ligament tear. Xray right with severe OA, soft tissue swelling, and evidence of old surgeries. S/p 4 days of motrin. Ice as needed. Improved.    Depression/anxiety - continue Remeron. Dr. Dutton consult.    Hypoalbuminemia/Poor appetite - likely due to above. Continue Remeron.    Insomnia - continue ativan at night which patient states helps his sleep. Trazodone as needed.    Cough - guaifenesin PRN. Incentive spirometry.    Hyponatremia/recent hypokalemia - likely due to poor nutrition. Start potassium supplementation. Monitor.    Bowel incontinence - once on admission, unclear etiology. Monitor. Some constipation, then with loose stools due to bowel meds.    DVT prophylaxis - not indicated as patient not a new paraplegic.      Total time:  >25 minutes.  I spent greater than 50% of the time for patient care and coordination on this date, including unit/floor time, and face-to-face time with the patient as per assessment and plan above.    Margo Bruce M.D.
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
## 2017-08-01 NOTE — PROGRESS NOTES
DATE OF SERVICE:  07/27/2017    Patient was seen for a followup visit.  The patient is settling into his rehab   _____ seemed less anxious today than when he first was seen.  Patient said he   is meeting all his goals.  Patient reiterated that he wants to return _____   of his rehab.    This session was devoted to talking about the patient's goals and the   importance of him.  Patient himself has lowered his threshold levels.       ____________________________________     PAULA MARINELLI, PHD    ADEN / ABBI    DD:  07/31/2017 15:54:07  DT:  07/31/2017 23:43:55    D#:  [id number]  Job#:  [id number]

## 2017-08-01 NOTE — CARE PLAN
Problem: Communication  Goal: The ability to communicate needs accurately and effectively will improve  Outcome: MET Date Met:  08/01/17  Pt is able to communicate his needs to staff effectively    Problem: Safety  Goal: Will remain free from injury  Outcome: PROGRESSING AS EXPECTED  Pt uses call light consistently for staff assistance. Pt has good safety awareness, no impulsivity observed.    Problem: Pain Management  Goal: Pain level will decrease to patient’s comfort goal  Outcome: PROGRESSING AS EXPECTED  No reports of pain at this time. Will continue to monitor through shift with hourly rounds.

## 2017-08-01 NOTE — PROGRESS NOTES
Received bedside report; assumed pt care. Pt A/O x 4, calm, up in chair, call light within reach  Will continue to monitor. Morning meds given

## 2017-08-01 NOTE — CARE PLAN
Problem: Safety  Goal: Will remain free from injury  Outcome: PROGRESSING AS EXPECTED  Continue to provide assistance as needed. Call light in place when in room. Fall precautions and safety measures in place. Pt understands limitations and to call for assistance     Problem: Pain Management  Goal: Pain level will decrease to patient’s comfort goal  Outcome: PROGRESSING AS EXPECTED  Pt has history of chronic pain, however is more worried about constipation then pain meds (just discussed this with pt) given senakot as ordered and miralax (pt requested) will follow up with next medication in bowel protocol (after therapy is done) last  7-30-17

## 2017-08-02 PROCEDURE — 97530 THERAPEUTIC ACTIVITIES: CPT

## 2017-08-02 PROCEDURE — 97112 NEUROMUSCULAR REEDUCATION: CPT

## 2017-08-02 PROCEDURE — 770010 HCHG ROOM/CARE - REHAB SEMI PRIVAT*

## 2017-08-02 PROCEDURE — 99232 SBSQ HOSP IP/OBS MODERATE 35: CPT | Performed by: PHYSICAL MEDICINE & REHABILITATION

## 2017-08-02 PROCEDURE — 97110 THERAPEUTIC EXERCISES: CPT

## 2017-08-02 PROCEDURE — A9270 NON-COVERED ITEM OR SERVICE: HCPCS | Performed by: PHYSICAL MEDICINE & REHABILITATION

## 2017-08-02 PROCEDURE — 700102 HCHG RX REV CODE 250 W/ 637 OVERRIDE(OP): Performed by: PHYSICAL MEDICINE & REHABILITATION

## 2017-08-02 RX ORDER — CALCIUM POLYCARBOPHIL 625 MG 625 MG/1
625 TABLET ORAL
Status: DISCONTINUED | OUTPATIENT
Start: 2017-08-02 | End: 2017-08-05

## 2017-08-02 RX ADMIN — BACLOFEN 10 MG: 10 TABLET ORAL at 20:42

## 2017-08-02 RX ADMIN — LORAZEPAM 1 MG: 1 TABLET ORAL at 20:42

## 2017-08-02 RX ADMIN — CALCIUM POLYCARBOPHIL 625 MG: 625 TABLET, FILM COATED ORAL at 11:13

## 2017-08-02 RX ADMIN — POTASSIUM CHLORIDE 20 MEQ: 1500 TABLET, EXTENDED RELEASE ORAL at 08:22

## 2017-08-02 RX ADMIN — CALCIUM POLYCARBOPHIL 625 MG: 625 TABLET, FILM COATED ORAL at 17:20

## 2017-08-02 RX ADMIN — MIRTAZAPINE 15 MG: 15 TABLET, ORALLY DISINTEGRATING ORAL at 20:42

## 2017-08-02 ASSESSMENT — PAIN SCALES - GENERAL
PAINLEVEL_OUTOF10: 0
PAINLEVEL_OUTOF10: 0

## 2017-08-02 NOTE — CARE PLAN
Problem: Safety  Goal: Will remain free from falls  Call light within reach, needs met.  Bed in low and locked position.  Patient educated on fall risk and verbalized agreement, patient does call appropriately for toileting, nutrition, and other needs.  Patient A&O x 4 and not impulsive.          Problem: Bowel/Gastric:  Goal: Normal bowel function is maintained or improved  Patient alternating between constipation and diarrhea.  Fibercon prescribed today.  1st dose given, tolerated well.  Patient educated and in agreement to drink 8oz water with each dose.  MIralax held this am.

## 2017-08-02 NOTE — REHAB-PHARMACY IDT TEAM NOTE
Pharmacy  Pharmacy  Antibiotics/Antifungals/Antivirals:  Medication:      Active Orders     None        Route:         None  Stop Date:  N/A  Reason:   Antihypertensives/Cardiac:  Medication:    Orders     None        Patient Vitals for the past 24 hrs:   BP Pulse   08/02/17 1300 104/51 mmHg 64   08/02/17 0700 111/69 mmHg 78   08/01/17 1847 114/72 mmHg 74       Anticoagulation:  Medication:  Not applicable  INR:      Other key medications:        A review of the medication list reveals no issues at this time.       Section completed by:  Carroll Ricks RPH

## 2017-08-02 NOTE — CARE PLAN
Problem: Safety  Goal: Will remain free from injury  Outcome: PROGRESSING AS EXPECTED  Pt uses call light consistently for staff assistance. Pt has good safety awareness, no impulsivity observed.    Problem: Bowel/Gastric:  Goal: Normal bowel function is maintained or improved  Outcome: PROGRESSING SLOWER THAN EXPECTED  Pt had 2 large incontinent loose BMs tonight.     Problem: Pain Management  Goal: Pain level will decrease to patient’s comfort goal  Outcome: PROGRESSING AS EXPECTED  No reports of pain at this time. Will continue to monitor through shift with hourly rounds.    Problem: Respiratory:  Goal: Respiratory status will improve  Outcome: MET Date Met:  08/02/17  Pt is on room air and respirations are WNL.

## 2017-08-02 NOTE — PROGRESS NOTES
0715: Bedside report received, assumed care for this patient.  Patient is A&O x 4.  VSS.  Communication board updated, call light and belongings are within reach.  Bed is in low position. Patient appears in no distress, and has no complaints of SOB and 0/10 of pain.  Will be medicated per MAR.  Plan of care discussed and agreed upon with patient.  Will hold miralax as patient had an episode of diarrhea last night.

## 2017-08-02 NOTE — THERAPY
Recreational Therapy Initial Plan of Care Note   1) Assessment: Patient is 67y.o. male with a diagnosis of debility secondary to spasticity, LE weakness, back and wrist pain due to h/o T9 sci. Additional factors influencing patient status / progress (ie: cognitive factors, co-morbidities, social support, etc):living alone, able to care for self, driving independently, depression, ETOH . Long term and short term goals have been discussed with patient and they are in agreement.   2) Plan: Recommend Recreational Therapy 30-60 minutes per day 3 to 4x per week for 3-4 weeks for the following treatments: Fine and Gross motor leisure functioning, Cognitive leisure functioning, Communication/Social skills, Leisure Education, Community Re-integration, Emotional and Behavioral functioning   3) Goals: Please refer to care plan for goals.

## 2017-08-03 PROCEDURE — 770010 HCHG ROOM/CARE - REHAB SEMI PRIVAT*

## 2017-08-03 PROCEDURE — 97530 THERAPEUTIC ACTIVITIES: CPT

## 2017-08-03 PROCEDURE — 97110 THERAPEUTIC EXERCISES: CPT

## 2017-08-03 PROCEDURE — 99233 SBSQ HOSP IP/OBS HIGH 50: CPT | Performed by: PHYSICAL MEDICINE & REHABILITATION

## 2017-08-03 PROCEDURE — 700102 HCHG RX REV CODE 250 W/ 637 OVERRIDE(OP): Performed by: PHYSICAL MEDICINE & REHABILITATION

## 2017-08-03 PROCEDURE — A9270 NON-COVERED ITEM OR SERVICE: HCPCS | Performed by: PHYSICAL MEDICINE & REHABILITATION

## 2017-08-03 PROCEDURE — 97535 SELF CARE MNGMENT TRAINING: CPT

## 2017-08-03 RX ORDER — METHYLPHENIDATE HYDROCHLORIDE 5 MG/1
5 TABLET ORAL
Status: DISCONTINUED | OUTPATIENT
Start: 2017-08-04 | End: 2017-08-03

## 2017-08-03 RX ADMIN — POTASSIUM CHLORIDE 20 MEQ: 1500 TABLET, EXTENDED RELEASE ORAL at 07:44

## 2017-08-03 RX ADMIN — LORAZEPAM 1 MG: 1 TABLET ORAL at 19:59

## 2017-08-03 RX ADMIN — CALCIUM POLYCARBOPHIL 625 MG: 625 TABLET, FILM COATED ORAL at 11:10

## 2017-08-03 RX ADMIN — BACLOFEN 10 MG: 10 TABLET ORAL at 20:02

## 2017-08-03 RX ADMIN — CALCIUM POLYCARBOPHIL 625 MG: 625 TABLET, FILM COATED ORAL at 07:43

## 2017-08-03 RX ADMIN — MIRTAZAPINE 15 MG: 15 TABLET, ORALLY DISINTEGRATING ORAL at 20:01

## 2017-08-03 RX ADMIN — POLYETHYLENE GLYCOL 3350 1 PACKET: 17 POWDER, FOR SOLUTION ORAL at 07:43

## 2017-08-03 ASSESSMENT — PAIN SCALES - GENERAL
PAINLEVEL_OUTOF10: 0
PAINLEVEL_OUTOF10: 0

## 2017-08-03 NOTE — CARE PLAN
Problem: Mobility  Goal: STG-Within one week, patient will propel wheelchair community  1) Individualized goal: Patient will propel wc SPV outdoors >150 ft x 2 over mild inclines and declines  2) Interventions: PT E Stim Attended, PT Orthotics Training, PT Gait Training, PT Self Care/Home Eval, PT Therapeutic Exercises, PT Neuro Re-Ed/Balance, PT Aquatic Therapy, PT Therapeutic Activity, PT Manual Therapy and PT Evaluation.  Outcome: MET Date Met:  08/03/17    Problem: Mobility Transfers  Goal: STG-Within one week, patient will perform bed mobility  1) Individualized goal: Patient will perform bed mobility SPV consistently with use of bed rails  2) Interventions: PT E Stim Attended, PT Orthotics Training, PT Gait Training, PT Self Care/Home Eval, PT Therapeutic Exercises, PT Neuro Re-Ed/Balance, PT Aquatic Therapy, PT Therapeutic Activity, PT Manual Therapy and PT Evaluation.  Outcome: NOT MET  SPV to L, MOd A to R  Goal: STG-Within one week, patient will transfer bed to chair  1) Individualized goal: Patient will transfer min A consistently wc <> bed with set up support  2) Interventions: PT E Stim Attended, PT Orthotics Training, PT Gait Training, PT Self Care/Home Eval, PT Therapeutic Exercises, PT Neuro Re-Ed/Balance, PT Aquatic Therapy, PT Therapeutic Activity, PT Manual Therapy and PT Evaluation.  Outcome: MET Date Met:  08/03/17

## 2017-08-03 NOTE — CARE PLAN
Problem: Safety  Goal: Will remain free from falls  Call light within reach, needs met.  Bed in low and locked position.  Patient educated on fall risk and verbalized agreement, patient does call appropriately for toileting, nutrition, and other needs.  Patient A&O x 4 and not impulsive.

## 2017-08-03 NOTE — CARE PLAN
Problem: Toileting  Goal: STG-Within one week, patient will complete toileting tasks  1) Individualized Goal: With supervision  2) Interventions: OT Orthotics Training, OT E Stim Attended, OT Self Care/ADL, OT Cognitive Skill Dev, OT Community Reintegration, OT Manual Ther Technique, OT Neuro Re-Ed/Balance, OT Sensory Int Techniques, OT Therapeutic Activity, OT Evaluation and OT Therapeutic Exercise   Outcome: MET Date Met:  08/03/17

## 2017-08-03 NOTE — PROGRESS NOTES
0715: Bedside report received, assumed care for this patient.  Patient is A&O x 4.  VSS.  Communication board updated, call light and belongings are within reach.  Bed is in low position. Patient appears in no distress, and has no complaints of SOB and 0/10 of pain.  Will be medicated per MAR.  Plan of care discussed and agreed upon with patient.

## 2017-08-03 NOTE — CARE PLAN
"Problem: Bowel/Gastric:  Goal: Normal bowel function is maintained or improved  Outcome: PROGRESSING AS EXPECTED  Patient refused his ordered Senna for H.S and stated that he will take in the morning. Patient did not take his Senna because of what happened the previous night when patient had multiple trips to the toilet to have a B.M. \" Last night was horrible\". As verbalized by patient.    Problem: Pain Management  Goal: Pain level will decrease to patient’s comfort goal  Outcome: PROGRESSING AS EXPECTED  Patient assessed during initial rounds.  Patient alert and coherent and had no complaints of pain/discomfort. Patient's ADL's assisted by staff, call light with in reach and patient monitored for any change in status.             "

## 2017-08-03 NOTE — CARE PLAN
Problem: Bathing  Goal: STG-Within one week, patient will bathe  1) Individualized Goal: With mod I using AE and DME prn  2) Interventions: OT Orthotics Training, OT E Stim Attended, OT Self Care/ADL, OT Cognitive Skill Dev, OT Community Reintegration, OT Manual Ther Technique, OT Neuro Re-Ed/Balance, OT Sensory Int Techniques, OT Therapeutic Activity, OT Evaluation and OT Therapeutic Exercise  Outcome: NOT MET    Problem: Dressing  Goal: STG-Within one week, patient will dress LB  1) Individualized Goal: with supervision, v/c, and AE prn  2) Interventions: OT Orthotics Training, OT E Stim Attended, OT Self Care/ADL, OT Cognitive Skill Dev, OT Community Reintegration, OT Manual Ther Technique, OT Neuro Re-Ed/Balance, OT Sensory Int Techniques, OT Therapeutic Activity, OT Evaluation and OT Therapeutic Exercise   Outcome: MET Date Met:  08/03/17    Problem: Functional Transfers  Goal: STG-Within one week, patient will transfer to toilet  1) Individualized Goal: With mod A using DME and v/c  2) Interventions: OT Orthotics Training, OT E Stim Attended, OT Self Care/ADL, OT Cognitive Skill Dev, OT Community Reintegration, OT Manual Ther Technique, OT Neuro Re-Ed/Balance, OT Sensory Int Techniques, OT Therapeutic Activity, OT Evaluation and OT Therapeutic Exercise   Outcome: NOT MET  Goal: STG-Within one week, patient will transfer to step in shower  1) Individualized Goal: With mod A using DME prn  2) Interventions: OT Orthotics Training, OT E Stim Attended, OT Self Care/ADL, OT Cognitive Skill Dev, OT Community Reintegration, OT Manual Ther Technique, OT Neuro Re-Ed/Balance, OT Sensory Int Techniques, OT Therapeutic Activity, OT Evaluation and OT Therapeutic Exercise   Outcome: NOT MET

## 2017-08-03 NOTE — PROGRESS NOTES
"Rehab Progress Note     Chief complaint: follow up history of T9 spinal cord injury, debility/failure to thrive  Date of Service: 8/2/2017    Interval Events (Subjective)  Patient seen and examined. No acute events overnight. Jacky assessed the patient's wheelchair yesterday and is attempting to make some modifications. Patient reports pain and spasms are currently well controlled. He's having difficulty with his bowels which include several days of constipation then loose stools and diarrhea due to the bowel meds. He is okay for a trial of fiber to see if we can get on top of this. Continues to participate very well with therapy and transfers continued to require less assistance. Both of his bilateral wrists also with less pain as well. Overall making slow and steady progress.    Objective:  VITAL SIGNS: /51 mmHg  Pulse 64  Temp(Src) 36.4 °C (97.5 °F)  Resp 18  Ht 1.93 m (6' 4\")  Wt 83 kg (182 lb 15.7 oz)  BMI 22.28 kg/m2  SpO2 95%  Gen: alert, no apparent distress  CV: mild tachycardia, regular rhythm, no murmurs, no peripheral edema  Resp: clear to ascultation bilaterally, normal respiratory effort  GI: soft, non-tender abdomen, bowel sounds present  Neuro: notable for flaccid right leg with muscular atrophy, increased tone at the knee, left hip, knee, and ankle 4 out of 5, increased tone at the left knee, patchy sensory changes in the bilateral legs    Recent Results (from the past 72 hour(s))   BASIC METABOLIC PANEL    Collection Time: 07/31/17  5:14 AM   Result Value Ref Range    Sodium 137 135 - 145 mmol/L    Potassium 3.3 (L) 3.6 - 5.5 mmol/L    Chloride 108 96 - 112 mmol/L    Co2 23 20 - 33 mmol/L    Glucose 81 65 - 99 mg/dL    Bun 10 8 - 22 mg/dL    Creatinine 0.60 0.50 - 1.40 mg/dL    Calcium 8.4 (L) 8.5 - 10.5 mg/dL    Anion Gap 6.0 0.0 - 11.9   ESTIMATED GFR    Collection Time: 07/31/17  5:14 AM   Result Value Ref Range    GFR If African American >60 >60 mL/min/1.73 m 2    GFR If Non " African American >60 >60 mL/min/1.73 m 2       Current Facility-Administered Medications   Medication Frequency   • calcium polycarbophil (FIBERCON) tablet 625 mg TID WITH MEALS   • potassium chloride SA (Kdur) tablet 20 mEq DAILY   • senna-docusate (PERICOLACE or SENOKOT S) 8.6-50 MG per tablet 2 Tab Q EVENING    And   • polyethylene glycol/lytes (MIRALAX) PACKET 1 Packet DAILY    And   • magnesium hydroxide (MILK OF MAGNESIA) suspension 30 mL QDAY PRN   • lactulose 20 GM/30ML solution 30 mL QDAY PRN   • bisacodyl (DULCOLAX) suppository 10 mg QDAY PRN   • oxycodone immediate-release (ROXICODONE) tablet 5 mg Q4HRS PRN   • baclofen (LIORESAL) tablet 10 mg QHS   • mirtazapine (REMERON) orally disintegrating tab 15 mg QHS   • guaiFENesin (ROBITUSSIN) 100 MG/5ML solution 200 mg Q4HRS PRN   • Respiratory Care per Protocol Continuous RT   • Pharmacy Consult Request ...Pain Management Review 1 Each PRN   • tramadol (ULTRAM) 50 MG tablet 50 mg Q4HRS PRN   • acetaminophen (TYLENOL) tablet 650 mg Q4HRS PRN   • artificial tears 1.4 % ophthalmic solution 1 Drop PRN   • benzocaine-menthol (CEPACOL) lozenge 1 Lozenge Q2HRS PRN   • mag hydrox-al hydrox-simeth (MAALOX PLUS ES or MYLANTA DS) suspension 20 mL Q2HRS PRN   • trazodone (DESYREL) tablet 50 mg QHS PRN   • sodium chloride (OCEAN) 0.65 % nasal spray 2 Spray PRN   • ondansetron (ZOFRAN ODT) dispertab 4 mg Q4HRS PRN   • acetaminophen (TYLENOL) tablet 650 mg Q4HRS PRN   • lorazepam (ATIVAN) tablet 1 mg QHS       Orders Placed This Encounter   Procedures   • Diet Order     Standing Status: Standing      Number of Occurrences: 1      Standing Expiration Date:      Order Specific Question:  Diet:     Answer:  Regular [1]         Assessment:  Active Hospital Problems    Diagnosis   • Debility   • Wrist pain   • Back pain   • Paraplegia (CMS-HCC)   • Chronic pain   • Depression   • Poor appetite   • T9 spinal cord injury (CMS-HCC)   • Failure to thrive (0-17)   • Leukopenia     I  led and attended the weekly conference, and agree with the IDT conference documentation and plan of care as noted below.    Date of conference: 7/27/2017    CM/social support: DC destination/dispostion:  Patient lives in a single level apartment.    Referrals: Possible PAS program    DC Needs: Patient has a tub bench, w/c, cushion and cane.  He may need a new w/c and new AFO's.  We have referred patient for evaluation with Joan and Nan.  I have discussed home health follow up and he is okay with this.    Barriers to discharge:   Decreasing function.    Strengths: very motivated.    Anticipated DC date: 8/22/2017    Home health: PT/OT/RN    Equip: 2 new AFOs, new wheelchair, transfer pole in house    Follow up: PCP, SCI clinic    Medical Decision Making and Plan:    Labs reviewed. Medications reviewed.    T9 incomplete spinal cord injury -with no movement of right leg, 4/5 left leg, increased tone at knees, patchy sensation in both legs. Continue therapies. Being evaluated for new bilateral AFOs and new/modified wheelchair.     Debility/Failure to thrive - multifactorial. Continue full rehab program.    Pancytopenia - due to poor nutritional intake. Dietician to see patient.     Alcohol overuse - likely using alcohol for his anxiety. Dr. Dutton to consult.     Spasticity - Scheduled baclofen at night. Monitor.    Bilateral wrist pain - continue splint on left, for severe OA/ligament tear. Xray right with severe OA, soft tissue swelling, and evidence of old surgeries. S/p 4 days of motrin. Ice as needed. Improved.    Depression/anxiety - continue Remeron. Dr. Dutton consult.    Hypoalbuminemia/Poor appetite - likely due to above. Continue Remeron.    Insomnia - continue ativan at night which patient states helps his sleep. Trazodone as needed.    Cough - guaifenesin PRN. Incentive spirometry. Much improved.    Hyponatremia/recent hypokalemia - likely due to poor nutrition. Start potassium supplementation.  Monitor.    Bowel incontinence - once on admission, unclear etiology. Monitor. Some constipation, then with loose stools due to bowel meds. Start fiber.    Bladder - continent.    DVT prophylaxis - not indicated as patient not a new paraplegic.      Total time:  >25 minutes.  I spent greater than 50% of the time for patient care and coordination on this date, including unit/floor time, and face-to-face time with the patient as per assessment and plan above.    Margo Bruce M.D.

## 2017-08-03 NOTE — CARE PLAN
Problem: Bowel/Gastric:  Goal: Normal bowel function is maintained or improved  Patient on fibercon to help with issues of constipation/diarrhea.  Took miralax this AM an d fibercon throughout day, no diarrhea noted.

## 2017-08-03 NOTE — REHAB-NURSING IDT TEAM NOTE
Nursing  Nursing  Diet/Nutrition:  Regular and Thin Liquids  Medication Administration:  Whole with Liquid Wash  % consumed at meals in last 24 hours:  Consumed 0-100% of meals per documentation.  Breakfast:  80%   Lunch:  100%  Dinner:  0%   Snack schedule:  None  Supplement:  Boost Plus  Appetite:  neither Fair nor Good  Fluid Intake/Output in past 24 hours: In: 600 [P.O.:600]  Out: 825   Admit Weight:  Weight: 83.008 kg (183 lb)  Weight Last 7 Days   [83 kg (182 lb 15.7 oz)] 83 kg (182 lb 15.7 oz) (07/30 0500)    Pain Issues:    Location:  --  --         Severity:  neither Denies nor Mild nor Moderate   Scheduled pain medications:  None     PRN pain medications used in last 24 hours:  None   Non Pharmacologic Interventions:  warm blanket, distraction, emotional support, environmental changes, relaxation, repositioned and rest  Effectiveness of pain management plan:  good=patient states acceptable comfort after interventions    Bowel:    Bowel Assist Initial Score:  2 - Max Assistance  Bowel Assist Current Score:  1 - Total Assistance  Bowl Accidents in last 7 days:  2  Last bowel movement: 08/01/17  Stool: Large, Loose, Brown     Usual bowel pattern:  every other day  Scheduled bowel medications:  docusate sodium (COLACE) and polyethylene glycol/lytes (MIRALAX)   PRN bowel medications used in last 24 hours:  None  Nursing Interventions:  Increased time, Scheduled medication, Supervision, Brief  Effectiveness of bowel program:   fair=sometimes needs prn bowel meds for constipation  Bladder:    Bladder Assist Initial Score:  5 - Standby Prompting/Supervision or Set-up  Bladder Assist Current Score:  3 - Moderate Assistance  Bladder Accidents in last 7 days:  1  PVR range for past 24-48 hours: --  Medications affecting bladder:  None    Time void schedule/voiding pattern:  Voiding every 2-4 hours  Interventions:  Increased time, Supervision, Verbal cueing, Emptying of device, Brief  Effectiveness of bladder  training:  Good=regular, predictable, emptying of bladder, patient initiates time voiding      Diabetes: N/A    Wound:         Patient Lines/Drains/Airways Status    Active Current Wounds     Name: Placement date: Placement time: Site: Days:    Wound Not POA Skin Tear Forearm Right 07/22/17  2230   11                   Interventions:  PRN wound cleansing / dressing  Effectiveness of intervention:  wound is improving     Wound Vac Location:  Not applicable    Sleep/wake cycle:   Average hours slept:  Sleeps 4-6 hours without waking  Sleep medication usage:  Declines    Patient/Family Training/Education:  Bowel Management/Training, Diet/Nutrition, Fall Prevention, General Self Care, Medication Management, Pain Management, Safe Transfers, Safety, Skin Care and Wound Care  Discharge Supply Recommendations:  Blood Pressure Monitor and Wound Care Supplies  Strengths: Alert and oriented, Willingly participates in therapeutic activities, Able to follow instructions, Supportive family, Pleasant and cooperative, Effective communication skills, Good carryover of learning, Motivated for self care and independence, Good endurance and Manages pain appropriately   Barriers:   Constipation, Fatigue, Generalized weakness and Limited mobility            Nursing Problems           Problem: Bowel/Gastric:     Goal: Normal bowel function is maintained or improved     Flowsheet: Taken at 07/30/17 1840    Last BM 07/30/17 by Teresa Frye       Goal: Will not experience complications related to bowel motility           Problem: Discharge Barriers/Planning     Goal: Patient's continuum of care needs will be met           Problem: Infection     Goal: Will remain free from infection           Problem: Knowledge Deficit     Goal: Knowledge of disease process/condition, treatment plan, diagnostic tests, and medications will improve         Goal: Knowledge of the prescribed therapeutic regimen will improve           Problem: Mobility     Goal:  Risk for activity intolerance will decrease           Problem: Pain Management     Goal: Pain level will decrease to patient's comfort goal           Problem: Safety     Goal: Will remain free from injury         Goal: Will remain free from falls           Problem: Venous Thromboembolism (VTW)/Deep Vein Thrombosis (DVT) Prevention:     Goal: Patient will participate in Venous Thrombosis (VTE)/Deep Vein Thrombosis (DVT)Prevention Measures                Long Term Goals:   At discharge patient will be able to fun[place]on safely and independently at home and in the community.    Section completed by:  Philippe Nair R.N.

## 2017-08-03 NOTE — REHAB-OT IDT TEAM NOTE
Occupational Therapy  Activities of Daily Living  Eating Initial:  5 - Standby Prompting/Supervision or Set-up  Eating Current:  6 - Modified Independent   Eating Description:  Increased time, Supervision for safety, Verbal cueing  Grooming Initial:  5 - Standby Prompting/Supervision or Set-up  Grooming Current:  5 - Standby Prompting/Supervision or Set-up   Grooming Description:  Set-up of equipment (washing face and combing hair, spv/ set-up only)  Bathing Initial:  5 - Standby Prompting/Supervision or Set-up  Bathing Current:  5 - Standby Prompting/Supervision or Set-up   Bathing Description:   (pt request to perform bed bath and not shower)  Upper Body Dressing Initial:  5 - Standby Prompting/Supervision or Set-up  Upper Body Dressing Current:  6 - Modified Independent   Upper Body Dressing Description:   (Mod I donning T-shirt)  Lower Body Dressing Initial:  5 - Standby Prompting/Supervsion or Set-up  Lower Body Dressing Current:5-standby assistance   Lower Body Dressing Description: Prompting/Supervsion or Set-up  Toileting Initial:  5 - Standby Prompting/Supervision or Set-up  Toileting Current:  5 - Standby Prompting/Supervision or Set-up   Toileting Description:  Increased time, Verbal cueing  Toilet Transfer Initial:  0 - Not tested, patient refused  Toilet Transfer Current:  1 - Total Assistance   Toilet Transfer Description:  1 - Total Assistance  Tub / Shower Transfer Initial:  1 - Total Assistance  Tub / Shower Transfer Current:  3- mod Assistance using grab bars   Tub / Shower Transfer Description:  Grab bar, Increased time, Supervision for safety, Verbal cueing, Set-up of equipment, Initial preparation for task, scoot transfer  IADL:  ongoing   Family Training/Education:  ongoing   DME/DC Recommendations: wheelchair, grab bars by toilet and in shower, shower tub bench/shower chair      Strengths:  Able to follow instructions, Alert and oriented, Effective communication skills, Good insight into  deficits/needs, Making steady progress towards goals, Motivated for self care and independence, Pleasant and cooperative and Willingly participates in therapeutic activities  Barriers:  Decreased endurance, Generalized weakness and Poor activity tolerance, declined shower for bed bath     # of short term goals set= 5    # of short term goals met= 2          Occupational Therapy Goals           Problem: Bathing     Dates: Start: 07/26/17       Goal: STG-Within one week, patient will bathe     Dates: Start: 07/26/17      Description: 1) Individualized Goal:  With mod I using AE and DME prn    2) Interventions:  OT Orthotics Training, OT E Stim Attended, OT Self Care/ADL, OT Cognitive Skill Dev, OT Community Reintegration, OT Manual Ther Technique, OT Neuro Re-Ed/Balance, OT Sensory Int Techniques, OT Therapeutic Activity, OT Evaluation and OT Therapeutic Exercise              Problem: Functional Transfers     Dates: Start: 07/26/17       Goal: STG-Within one week, patient will transfer to toilet     Dates: Start: 07/26/17      Description: 1) Individualized Goal:  With mod A using DME and v/c    2) Interventions:  OT Orthotics Training, OT E Stim Attended, OT Self Care/ADL, OT Cognitive Skill Dev, OT Community Reintegration, OT Manual Ther Technique, OT Neuro Re-Ed/Balance, OT Sensory Int Techniques, OT Therapeutic Activity, OT Evaluation and OT Therapeutic Exercise        Goal: STG-Within one week, patient will transfer to step in shower     Dates: Start: 07/26/17      Description: 1) Individualized Goal:  With mod A using DME prn    2) Interventions:  OT Orthotics Training, OT E Stim Attended, OT Self Care/ADL, OT Cognitive Skill Dev, OT Community Reintegration, OT Manual Ther Technique, OT Neuro Re-Ed/Balance, OT Sensory Int Techniques, OT Therapeutic Activity, OT Evaluation and OT Therapeutic Exercise          Problem: OT Long Term Goals     Dates: Start: 07/27/17       Goal: LTG-By discharge, patient will  complete basic self care tasks     Dates: Start: 07/27/17      Description: 1) Individualized Goal: modified independent at wheelchair level    2) Interventions:  OT Self Care/ADL and OT Neuro Re-Ed/Balance            Goal: LTG-By discharge, patient will perform bathroom transfers     Dates: Start: 07/27/17      Description: 1) Individualized Goal: modified independent wheelchair level    2) Interventions:  OT Self Care/ADL and OT Neuro Re-Ed/Balance            Goal: LTG-By discharge, patient will complete basic home management     Dates: Start: 07/27/17      Description: 1) Individualized Goal:  Modified independent at a wheelchair level.    2) Interventions:  OT Self Care/ADL, OT Community Reintegration, OT Neuro Re-Ed/Balance, OT Therapeutic Activity and OT Therapeutic Exercise                  Section completed by:  GOLD Ortega/Claudia Renteria OTJESS/EILEEN
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
## 2017-08-04 PROCEDURE — 99232 SBSQ HOSP IP/OBS MODERATE 35: CPT | Performed by: PHYSICAL MEDICINE & REHABILITATION

## 2017-08-04 PROCEDURE — 770010 HCHG ROOM/CARE - REHAB SEMI PRIVAT*

## 2017-08-04 PROCEDURE — A9270 NON-COVERED ITEM OR SERVICE: HCPCS | Performed by: PHYSICAL MEDICINE & REHABILITATION

## 2017-08-04 PROCEDURE — 97110 THERAPEUTIC EXERCISES: CPT

## 2017-08-04 PROCEDURE — 700102 HCHG RX REV CODE 250 W/ 637 OVERRIDE(OP): Performed by: PHYSICAL MEDICINE & REHABILITATION

## 2017-08-04 PROCEDURE — 97535 SELF CARE MNGMENT TRAINING: CPT

## 2017-08-04 PROCEDURE — 97530 THERAPEUTIC ACTIVITIES: CPT

## 2017-08-04 PROCEDURE — 97140 MANUAL THERAPY 1/> REGIONS: CPT

## 2017-08-04 RX ORDER — BACLOFEN 10 MG/1
10 TABLET ORAL 3 TIMES DAILY PRN
Status: DISCONTINUED | OUTPATIENT
Start: 2017-08-04 | End: 2017-08-26 | Stop reason: HOSPADM

## 2017-08-04 RX ADMIN — BACLOFEN 10 MG: 10 TABLET ORAL at 10:33

## 2017-08-04 RX ADMIN — Medication 2 TABLET: at 21:52

## 2017-08-04 RX ADMIN — BACLOFEN 10 MG: 10 TABLET ORAL at 21:52

## 2017-08-04 RX ADMIN — POTASSIUM CHLORIDE 20 MEQ: 1500 TABLET, EXTENDED RELEASE ORAL at 08:24

## 2017-08-04 RX ADMIN — LORAZEPAM 1 MG: 1 TABLET ORAL at 21:52

## 2017-08-04 RX ADMIN — LACTULOSE 30 ML: 20 SOLUTION ORAL at 08:24

## 2017-08-04 RX ADMIN — MIRTAZAPINE 15 MG: 15 TABLET, ORALLY DISINTEGRATING ORAL at 21:52

## 2017-08-04 ASSESSMENT — PAIN SCALES - GENERAL
PAINLEVEL_OUTOF10: 0
PAINLEVEL_OUTOF10: 0

## 2017-08-04 NOTE — PROGRESS NOTES
"Patient AOx4 has good safety awareness and uses the call light when assistance is needed. Patient states after he got up this am he was experiencing a \"jumpy\" tingling sensation in his left leg. Dr Bruce is aware and ordered prn Baclofen. Patient denies pain and has been up participating in therapies.    Patient given lactulose this am, no BM x 3 days. Will continue to encourage fluids throughout the day.  Patient had BM at 1120, pinkish color in stool, Dr Bruce evaluated and stated monitor patient. Patient denies HX of hemorrhoids, states he has been eating strawberries, will continue to monitor and assess.     "
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
## 2017-08-04 NOTE — CARE PLAN
Problem: Bowel/Gastric:  Goal: Will not experience complications related to bowel motility  Intervention: Assess baseline bowel pattern  Patient AOx4 has not had a BM for x 3 days. Patient given lactulose this am, will encourage increased fluid intake throughout the day.      Problem: Pain Management  Goal: Pain level will decrease to patient’s comfort goal  Intervention: Follow pain managment plan developed in collaboration with patient and Interdisciplinary Team  Patient was experiencing left leg spasms this am. Dr Bruce ordered prn baclofen. Patient states he has had this in the past with good results at most intervals. Will continue to monitor and assess.

## 2017-08-04 NOTE — PROGRESS NOTES
received patient on bed. conscious coherent .not in PC distress. no SOB. no chest pain. verbalized no significant changes in patient neurovascular status. safety and fall precaution reinforced.

## 2017-08-04 NOTE — PROGRESS NOTES
reported to incoming nurse patient last BM was 8/3/2017. patient refused to have PRN BM meds for now

## 2017-08-04 NOTE — CARE PLAN
Problem: Other Problem (see comments)  Goal: Other Goal  Monitor/Evaluation: Monitor PO intake, weight, labs, medication adjustments, skin integrity, GI function, vitals, I/Os, and overall hydration status. Adjust nutritional POC pending clinical outcomes.   RD following weekly.   Goal: Maintain adequate oral nutrient/fluid intake to promote nutrition optimization/healing/ resolution of malnutrition.   Outcome: MET Date Met:  08/04/17  Having bowel issues which affect PO (Fiber added- encourage fluid).  Otherwise, appetite is fair, but eating well and supplement providing kcals/protein patient is not consuming  Via meals. RD following PRN. Weight stable, skin tear noted, no new labs since 7/31.

## 2017-08-04 NOTE — DISCHARGE PLANNING
Case Management:  Patient's w/c is being assessed for repair by [employer].  He has been fitted for AFO.  I will continue to follow for his dme outcome.

## 2017-08-04 NOTE — CARE PLAN
Problem: Safety  Goal: Will remain free from injury  Intervention: Provide assistance with mobility  patient verbalized will not get up without assist. call button within reach.safety and fall precaution reinforced.           Problem: Pain Management  Goal: Pain level will decrease to patient’s comfort goal  Outcome: PROGRESSING AS EXPECTED  patient verbalized PRN pain meds been helping with pain control. encouraged to do diversional activity. and relaxation exercises.

## 2017-08-05 PROCEDURE — A9270 NON-COVERED ITEM OR SERVICE: HCPCS | Performed by: PHYSICAL MEDICINE & REHABILITATION

## 2017-08-05 PROCEDURE — 97110 THERAPEUTIC EXERCISES: CPT

## 2017-08-05 PROCEDURE — 97530 THERAPEUTIC ACTIVITIES: CPT

## 2017-08-05 PROCEDURE — 700102 HCHG RX REV CODE 250 W/ 637 OVERRIDE(OP): Performed by: PHYSICAL MEDICINE & REHABILITATION

## 2017-08-05 PROCEDURE — 770010 HCHG ROOM/CARE - REHAB SEMI PRIVAT*

## 2017-08-05 PROCEDURE — 99232 SBSQ HOSP IP/OBS MODERATE 35: CPT | Performed by: PHYSICAL MEDICINE & REHABILITATION

## 2017-08-05 RX ORDER — CALCIUM POLYCARBOPHIL 625 MG 625 MG/1
625 TABLET ORAL 3 TIMES DAILY PRN
Status: DISCONTINUED | OUTPATIENT
Start: 2017-08-05 | End: 2017-08-26 | Stop reason: HOSPADM

## 2017-08-05 RX ADMIN — BACLOFEN 10 MG: 10 TABLET ORAL at 11:28

## 2017-08-05 RX ADMIN — BACLOFEN 10 MG: 10 TABLET ORAL at 20:54

## 2017-08-05 RX ADMIN — LORAZEPAM 1 MG: 1 TABLET ORAL at 20:53

## 2017-08-05 RX ADMIN — MIRTAZAPINE 15 MG: 15 TABLET, ORALLY DISINTEGRATING ORAL at 20:53

## 2017-08-05 RX ADMIN — POLYETHYLENE GLYCOL 3350 1 PACKET: 17 POWDER, FOR SOLUTION ORAL at 08:25

## 2017-08-05 RX ADMIN — POTASSIUM CHLORIDE 20 MEQ: 1500 TABLET, EXTENDED RELEASE ORAL at 08:25

## 2017-08-05 RX ADMIN — BACLOFEN 10 MG: 10 TABLET ORAL at 03:47

## 2017-08-05 RX ADMIN — Medication 2 TABLET: at 20:53

## 2017-08-05 ASSESSMENT — PAIN SCALES - GENERAL: PAINLEVEL_OUTOF10: 0

## 2017-08-05 NOTE — CARE PLAN
Problem: Safety  Goal: Will remain free from falls  Intervention: Assess risk factors for falls  Patient AOx4 has good safety awareness and uses the call light when assistance is needed. Patient has call light close by, shoes on when out of bed, bed in low position.

## 2017-08-05 NOTE — CARE PLAN
Problem: Safety  Goal: Will remain free from injury  Outcome: PROGRESSING AS EXPECTED  Pt uses call light consistently for staff assistance. Pt has good safety awareness, no impulsivity observed.    Problem: Infection  Goal: Will remain free from infection  Outcome: PROGRESSING AS EXPECTED  No s/s of infection present    Problem: Pain Management  Goal: Pain level will decrease to patient’s comfort goal  Outcome: PROGRESSING AS EXPECTED  PRN Baclofen x 1 this shift for leg spasticity. Given at pt request. Pt asleep with reassessment.

## 2017-08-05 NOTE — CARE PLAN
Problem: Bowel/Gastric:  Goal: Will not experience complications related to bowel motility  Intervention: Assess baseline bowel pattern  Patient had a BM yesterday and has refused scheduled fiber this am. Patient educated to drink plenty of fluids to promote regular bowel movements.

## 2017-08-05 NOTE — PROGRESS NOTES
"Rehab Progress Note     Chief complaint: left leg spasms, follow up history of T9 spinal cord injury, debility/failure to thrive  Date of Service: 8/5/2017    Interval Events (Subjective)  Patient seen and examined. No acute events overnight. Leg with much less spasms today using extra doses of baclofen during the day. Patient has been refusing the fiber as he is now having regular bowel movements without incontinence. No new complaints.    Objective:  VITAL SIGNS: /70 mmHg  Pulse 78  Temp(Src) 36.8 °C (98.3 °F)  Resp 18  Ht 1.93 m (6' 4\")  Wt 83 kg (182 lb 15.7 oz)  BMI 22.28 kg/m2  SpO2 98%  Gen: alert, no apparent distress  CV: mild tachycardia, regular rhythm, no murmurs, no peripheral edema  Resp: clear to ascultation bilaterally, normal respiratory effort  GI: soft, non-tender abdomen, bowel sounds present  Neuro: notable for flaccid right leg with muscular atrophy, increased tone at the knee, left hip, knee, and ankle 4 out of 5, increased tone at the left knee, patchy sensory changes in the bilateral legs    No results found for this or any previous visit (from the past 72 hour(s)).    Current Facility-Administered Medications   Medication Frequency   • baclofen (LIORESAL) tablet 10 mg TID PRN   • calcium polycarbophil (FIBERCON) tablet 625 mg TID WITH MEALS   • potassium chloride SA (Kdur) tablet 20 mEq DAILY   • senna-docusate (PERICOLACE or SENOKOT S) 8.6-50 MG per tablet 2 Tab Q EVENING    And   • polyethylene glycol/lytes (MIRALAX) PACKET 1 Packet DAILY    And   • magnesium hydroxide (MILK OF MAGNESIA) suspension 30 mL QDAY PRN   • lactulose 20 GM/30ML solution 30 mL QDAY PRN   • bisacodyl (DULCOLAX) suppository 10 mg QDAY PRN   • oxycodone immediate-release (ROXICODONE) tablet 5 mg Q4HRS PRN   • baclofen (LIORESAL) tablet 10 mg QHS   • mirtazapine (REMERON) orally disintegrating tab 15 mg QHS   • guaiFENesin (ROBITUSSIN) 100 MG/5ML solution 200 mg Q4HRS PRN   • Respiratory Care per " Protocol Continuous RT   • Pharmacy Consult Request ...Pain Management Review 1 Each PRN   • tramadol (ULTRAM) 50 MG tablet 50 mg Q4HRS PRN   • acetaminophen (TYLENOL) tablet 650 mg Q4HRS PRN   • artificial tears 1.4 % ophthalmic solution 1 Drop PRN   • benzocaine-menthol (CEPACOL) lozenge 1 Lozenge Q2HRS PRN   • mag hydrox-al hydrox-simeth (MAALOX PLUS ES or MYLANTA DS) suspension 20 mL Q2HRS PRN   • trazodone (DESYREL) tablet 50 mg QHS PRN   • sodium chloride (OCEAN) 0.65 % nasal spray 2 Spray PRN   • ondansetron (ZOFRAN ODT) dispertab 4 mg Q4HRS PRN   • acetaminophen (TYLENOL) tablet 650 mg Q4HRS PRN   • lorazepam (ATIVAN) tablet 1 mg QHS       Orders Placed This Encounter   Procedures   • Diet Order     Standing Status: Standing      Number of Occurrences: 1      Standing Expiration Date:      Order Specific Question:  Diet:     Answer:  Regular [1]         Assessment:  Active Hospital Problems    Diagnosis   • Debility   • Wrist pain   • Back pain   • Paraplegia (CMS-HCC)   • Chronic pain   • Depression   • Poor appetite   • T9 spinal cord injury (CMS-HCC)   • Failure to thrive (0-17)   • Leukopenia     I led and attended the weekly conference, and agree with the IDT conference documentation and plan of care as noted below.     Date of conference: 8/3/2017    Admission FIM 61 --> 79    CM/social support: DC destination/dispostion:  Patient lives in a single level apartment.    Referrals: Possible PAS program    DC Needs: Patient has a tub bench, w/c, cushion and cane.  He may need a new w/c and new AFO's.  We have referred patient for evaluation with Joan and Nan.  I have discussed home health follow up and he is okay with this.    Barriers to discharge:   Decreasing function.    Strengths: very motivated.    Anticipated DC date: 8/22/2017    Home health: PT/OT/RN    Equip: 2 new AFOs, new wheelchair, transfer pole in house    Follow up: PCP, SCI clinic    Medical Decision Making and Plan:    Labs  reviewed. Medications reviewed.    T9 incomplete spinal cord injury -with no movement of right leg, 4/5 left leg, increased tone at knees, patchy sensation in both legs. Continue therapies. Being evaluated for new bilateral AFOs and new/modified wheelchair.     Debility/Failure to thrive - multifactorial. Continue full rehab program.    Pancytopenia - due to poor nutritional intake. Dietician to see patient.     Alcohol overuse - likely using alcohol for his anxiety. Dr. Dutton to consult.     Spasticity - Scheduled baclofen at night. Increased today. Start PRN TID baclofen during the day.    Bilateral wrist pain - continue splint on left, for severe OA/ligament tear. Xray right with severe OA, soft tissue swelling, and evidence of old surgeries. S/p 4 days of motrin. Ice as needed. Improved.    Depression/anxiety - continue Remeron. Dr. Dutton consult.    Hypoalbuminemia/Poor appetite - likely due to above. Continue Remeron.    Insomnia - continue ativan at night which patient states helps his sleep. Trazodone as needed.    Cough - guaifenesin PRN. Incentive spirometry. Much improved.    Hyponatremia/recent hypokalemia - likely due to poor nutrition. Start potassium supplementation. Monitor.    Bowel incontinence - twice since admission, unclear etiology. Monitor. Started fiber. Now resolved. Stop fiber.    Bladder - continent.    DVT prophylaxis - not indicated as patient not a new paraplegic.      Total time:  >25 minutes.  I spent greater than 50% of the time for patient care and coordination on this date, including unit/floor time, and face-to-face time with the patient as per assessment and plan above.    Margo Bruce M.D.

## 2017-08-05 NOTE — PROGRESS NOTES
"Rehab Progress Note     Chief complaint: left leg spasms, follow up history of T9 spinal cord injury, debility/failure to thrive  Date of Service: 8/4/2017    Interval Events (Subjective)  Patient seen and examined. No acute events overnight. However this morning after barely twisting in his chair he started to have increasing spasms in the left leg. He reports is very similar to the increased spasticity he started to experience back in July. He has no other new complaints or symptoms. He is agreeable to try when necessary baclofen during the day.    Objective:  VITAL SIGNS: /76 mmHg  Pulse 76  Temp(Src) 36.9 °C (98.4 °F)  Resp 18  Ht 1.93 m (6' 4\")  Wt 83 kg (182 lb 15.7 oz)  BMI 22.28 kg/m2  SpO2 98%  Gen: alert, no apparent distress  CV: mild tachycardia, regular rhythm, no murmurs, no peripheral edema  Resp: clear to ascultation bilaterally, normal respiratory effort  GI: soft, non-tender abdomen, bowel sounds present  Neuro: notable for flaccid right leg with muscular atrophy, increased tone at the knee, left hip, knee, and ankle 4 out of 5, increased tone at the left knee, patchy sensory changes in the bilateral legs    No results found for this or any previous visit (from the past 72 hour(s)).    Current Facility-Administered Medications   Medication Frequency   • baclofen (LIORESAL) tablet 10 mg TID PRN   • calcium polycarbophil (FIBERCON) tablet 625 mg TID WITH MEALS   • potassium chloride SA (Kdur) tablet 20 mEq DAILY   • senna-docusate (PERICOLACE or SENOKOT S) 8.6-50 MG per tablet 2 Tab Q EVENING    And   • polyethylene glycol/lytes (MIRALAX) PACKET 1 Packet DAILY    And   • magnesium hydroxide (MILK OF MAGNESIA) suspension 30 mL QDAY PRN   • lactulose 20 GM/30ML solution 30 mL QDAY PRN   • bisacodyl (DULCOLAX) suppository 10 mg QDAY PRN   • oxycodone immediate-release (ROXICODONE) tablet 5 mg Q4HRS PRN   • baclofen (LIORESAL) tablet 10 mg QHS   • mirtazapine (REMERON) orally disintegrating " tab 15 mg QHS   • guaiFENesin (ROBITUSSIN) 100 MG/5ML solution 200 mg Q4HRS PRN   • Respiratory Care per Protocol Continuous RT   • Pharmacy Consult Request ...Pain Management Review 1 Each PRN   • tramadol (ULTRAM) 50 MG tablet 50 mg Q4HRS PRN   • acetaminophen (TYLENOL) tablet 650 mg Q4HRS PRN   • artificial tears 1.4 % ophthalmic solution 1 Drop PRN   • benzocaine-menthol (CEPACOL) lozenge 1 Lozenge Q2HRS PRN   • mag hydrox-al hydrox-simeth (MAALOX PLUS ES or MYLANTA DS) suspension 20 mL Q2HRS PRN   • trazodone (DESYREL) tablet 50 mg QHS PRN   • sodium chloride (OCEAN) 0.65 % nasal spray 2 Spray PRN   • ondansetron (ZOFRAN ODT) dispertab 4 mg Q4HRS PRN   • acetaminophen (TYLENOL) tablet 650 mg Q4HRS PRN   • lorazepam (ATIVAN) tablet 1 mg QHS       Orders Placed This Encounter   Procedures   • Diet Order     Standing Status: Standing      Number of Occurrences: 1      Standing Expiration Date:      Order Specific Question:  Diet:     Answer:  Regular [1]         Assessment:  Active Hospital Problems    Diagnosis   • Debility   • Wrist pain   • Back pain   • Paraplegia (CMS-HCC)   • Chronic pain   • Depression   • Poor appetite   • T9 spinal cord injury (CMS-HCC)   • Failure to thrive (0-17)   • Leukopenia     I led and attended the weekly conference, and agree with the IDT conference documentation and plan of care as noted below.     Date of conference: 8/3/2017    Admission FIM 61 --> 79    CM/social support: DC destination/dispostion:  Patient lives in a single level apartment.    Referrals: Possible PAS program    DC Needs: Patient has a tub bench, w/c, cushion and cane.  He may need a new w/c and new AFO's.  We have referred patient for evaluation with Joan and Nan.  I have discussed home health follow up and he is okay with this.    Barriers to discharge:   Decreasing function.    Strengths: very motivated.    Anticipated DC date: 8/22/2017    Home health: PT/OT/RN    Equip: 2 new AFOs, new  wheelchair, transfer pole in house    Follow up: PCP, SCI clinic    Medical Decision Making and Plan:    Labs reviewed. Medications reviewed.    T9 incomplete spinal cord injury -with no movement of right leg, 4/5 left leg, increased tone at knees, patchy sensation in both legs. Continue therapies. Being evaluated for new bilateral AFOs and new/modified wheelchair.     Debility/Failure to thrive - multifactorial. Continue full rehab program.    Pancytopenia - due to poor nutritional intake. Dietician to see patient.     Alcohol overuse - likely using alcohol for his anxiety. Dr. Dutton to consult.     Spasticity - Scheduled baclofen at night. Increased today. Start PRN TID baclofen during the day.    Bilateral wrist pain - continue splint on left, for severe OA/ligament tear. Xray right with severe OA, soft tissue swelling, and evidence of old surgeries. S/p 4 days of motrin. Ice as needed. Improved.    Depression/anxiety - continue Remeron. Dr. Dutton consult.    Hypoalbuminemia/Poor appetite - likely due to above. Continue Remeron.    Insomnia - continue ativan at night which patient states helps his sleep. Trazodone as needed.    Cough - guaifenesin PRN. Incentive spirometry. Much improved.    Hyponatremia/recent hypokalemia - likely due to poor nutrition. Start potassium supplementation. Monitor.    Bowel incontinence - twice since admission, unclear etiology. Monitor. Started fiber.    Bladder - continent.    DVT prophylaxis - not indicated as patient not a new paraplegic.      Total time:  >25 minutes.  I spent greater than 50% of the time for patient care and coordination on this date, including unit/floor time, and face-to-face time with the patient as per assessment and plan above.    Margo Bruce M.D.

## 2017-08-05 NOTE — PROGRESS NOTES
Patient AOx4 has good safety awareness and uses the call light when assistance is needed. Patient denies complaints of pain and has been up to breakfast this am. Will continue to monitor and assess throughout the day.

## 2017-08-06 LAB — POTASSIUM SERPL-SCNC: 3.8 MMOL/L (ref 3.6–5.5)

## 2017-08-06 PROCEDURE — 700102 HCHG RX REV CODE 250 W/ 637 OVERRIDE(OP): Performed by: PHYSICAL MEDICINE & REHABILITATION

## 2017-08-06 PROCEDURE — A9270 NON-COVERED ITEM OR SERVICE: HCPCS | Performed by: PHYSICAL MEDICINE & REHABILITATION

## 2017-08-06 PROCEDURE — 97530 THERAPEUTIC ACTIVITIES: CPT

## 2017-08-06 PROCEDURE — 84132 ASSAY OF SERUM POTASSIUM: CPT

## 2017-08-06 PROCEDURE — 97110 THERAPEUTIC EXERCISES: CPT

## 2017-08-06 PROCEDURE — 770010 HCHG ROOM/CARE - REHAB SEMI PRIVAT*

## 2017-08-06 PROCEDURE — 36415 COLL VENOUS BLD VENIPUNCTURE: CPT

## 2017-08-06 RX ADMIN — LORAZEPAM 1 MG: 1 TABLET ORAL at 20:30

## 2017-08-06 RX ADMIN — POLYETHYLENE GLYCOL 3350 1 PACKET: 17 POWDER, FOR SOLUTION ORAL at 08:16

## 2017-08-06 RX ADMIN — Medication 2 TABLET: at 20:30

## 2017-08-06 RX ADMIN — BACLOFEN 10 MG: 10 TABLET ORAL at 20:31

## 2017-08-06 RX ADMIN — POTASSIUM CHLORIDE 20 MEQ: 1500 TABLET, EXTENDED RELEASE ORAL at 08:14

## 2017-08-06 RX ADMIN — MIRTAZAPINE 15 MG: 15 TABLET, ORALLY DISINTEGRATING ORAL at 20:30

## 2017-08-06 ASSESSMENT — PAIN SCALES - GENERAL: PAINLEVEL_OUTOF10: 0

## 2017-08-06 NOTE — CARE PLAN
Problem: Pain Management  Goal: Pain level will decrease to patient’s comfort goal  Outcome: PROGRESSING AS EXPECTED  Patient assessed during initial rounds.  Patient alert and coherent and had no complaints of pain/discomfort. Patient's ADL's assisted by staff, call light with in reach and patient monitored for any change in status.

## 2017-08-06 NOTE — CARE PLAN
Problem: Safety  Goal: Will remain free from injury  Intervention: Provide assistance with mobility  Patient with weakness of both lower extremities, assisted to transfer via slide board to prevent falls.      Problem: Infection  Goal: Will remain free from infection  Patient with skin tear to right FA with biatin dressing in place, will continue to monitor site for any signs of infection.

## 2017-08-07 PROCEDURE — 97530 THERAPEUTIC ACTIVITIES: CPT

## 2017-08-07 PROCEDURE — 700102 HCHG RX REV CODE 250 W/ 637 OVERRIDE(OP): Performed by: PHYSICAL MEDICINE & REHABILITATION

## 2017-08-07 PROCEDURE — 97110 THERAPEUTIC EXERCISES: CPT

## 2017-08-07 PROCEDURE — 97535 SELF CARE MNGMENT TRAINING: CPT

## 2017-08-07 PROCEDURE — A9270 NON-COVERED ITEM OR SERVICE: HCPCS | Performed by: PHYSICAL MEDICINE & REHABILITATION

## 2017-08-07 PROCEDURE — 99232 SBSQ HOSP IP/OBS MODERATE 35: CPT | Performed by: PHYSICAL MEDICINE & REHABILITATION

## 2017-08-07 PROCEDURE — 770010 HCHG ROOM/CARE - REHAB SEMI PRIVAT*

## 2017-08-07 RX ORDER — HYDROCORTISONE ACETATE 25 MG/1
25 SUPPOSITORY RECTAL EVERY 12 HOURS
Status: DISCONTINUED | OUTPATIENT
Start: 2017-08-07 | End: 2017-08-10 | Stop reason: ALTCHOICE

## 2017-08-07 RX ADMIN — BACLOFEN 10 MG: 10 TABLET ORAL at 20:44

## 2017-08-07 RX ADMIN — HYDROCORTISONE ACETATE 25 MG: 25 SUPPOSITORY RECTAL at 20:44

## 2017-08-07 RX ADMIN — Medication 2 TABLET: at 20:44

## 2017-08-07 RX ADMIN — LORAZEPAM 1 MG: 1 TABLET ORAL at 20:44

## 2017-08-07 RX ADMIN — POLYETHYLENE GLYCOL 3350 1 PACKET: 17 POWDER, FOR SOLUTION ORAL at 08:00

## 2017-08-07 RX ADMIN — POTASSIUM CHLORIDE 20 MEQ: 1500 TABLET, EXTENDED RELEASE ORAL at 08:00

## 2017-08-07 RX ADMIN — MIRTAZAPINE 15 MG: 15 TABLET, ORALLY DISINTEGRATING ORAL at 20:44

## 2017-08-07 ASSESSMENT — PAIN SCALES - GENERAL
PAINLEVEL_OUTOF10: 0
PAINLEVEL_OUTOF10: 0

## 2017-08-07 NOTE — CARE PLAN
Problem: Pain Management  Goal: Pain level will decrease to patient’s comfort goal  Outcome: PROGRESSING AS EXPECTED  Patient denies pain. No sign of discomfort noted. Will monitor throughout the shift.    Problem: Skin Integrity  Goal: Risk for impaired skin integrity will decrease  Intervention: Assess risk factors for impaired skin integrity and/or pressure ulcers  Right forearm skin tear appears healing apropritelly. Dressing (biatain) was removed per patient request. Skin lotion was applied due to dry skin around the affected area.

## 2017-08-07 NOTE — PROGRESS NOTES
"Rehab Progress Note     Chief complaint: blood on stools, follow up history of T9 spinal cord injury, debility/failure to thrive  Date of Service: 8/7/2017    Interval Events (Subjective)  Patient seen and examined. No acute events overnight. Patient has had 2 episodes now of a small amount of blood on the outside of his stool. He denies a history of hemorrhoids. He does report he has been constipated and thinks he might be straining. He is okay for a trial of Anusol. The increased spasms in the left leg Completely resolved. He reports his appetite has greatly improved. He has plans to buy a wheelchair accessible van. Joan still has his wheelchair and is making adjustments.    Objective:  VITAL SIGNS: /68 mmHg  Pulse 77  Temp(Src) 36.3 °C (97.4 °F)  Resp 16  Ht 1.93 m (6' 4\")  Wt 83 kg (182 lb 15.7 oz)  BMI 22.28 kg/m2  SpO2 97%  Gen: alert, no apparent distress  CV: mild tachycardia, regular rhythm, no murmurs, no peripheral edema  Resp: clear to ascultation bilaterally, normal respiratory effort  GI: soft, non-tender abdomen, bowel sounds present  Neuro: notable for flaccid right leg with muscular atrophy, increased tone at the knee, left hip, knee, and ankle 4 out of 5, increased tone at the left knee, patchy sensory changes in the bilateral legs    Recent Results (from the past 72 hour(s))   POTASSIUM SERUM (K)    Collection Time: 08/06/17  5:30 AM   Result Value Ref Range    Potassium 3.8 3.6 - 5.5 mmol/L       Current Facility-Administered Medications   Medication Frequency   • calcium polycarbophil (FIBERCON) tablet 625 mg TID PRN   • baclofen (LIORESAL) tablet 10 mg TID PRN   • senna-docusate (PERICOLACE or SENOKOT S) 8.6-50 MG per tablet 2 Tab Q EVENING    And   • polyethylene glycol/lytes (MIRALAX) PACKET 1 Packet DAILY    And   • magnesium hydroxide (MILK OF MAGNESIA) suspension 30 mL QDAY PRN   • lactulose 20 GM/30ML solution 30 mL QDAY PRN   • bisacodyl (DULCOLAX) suppository 10 mg QDAY " PRN   • oxycodone immediate-release (ROXICODONE) tablet 5 mg Q4HRS PRN   • baclofen (LIORESAL) tablet 10 mg QHS   • mirtazapine (REMERON) orally disintegrating tab 15 mg QHS   • guaiFENesin (ROBITUSSIN) 100 MG/5ML solution 200 mg Q4HRS PRN   • Respiratory Care per Protocol Continuous RT   • Pharmacy Consult Request ...Pain Management Review 1 Each PRN   • tramadol (ULTRAM) 50 MG tablet 50 mg Q4HRS PRN   • acetaminophen (TYLENOL) tablet 650 mg Q4HRS PRN   • artificial tears 1.4 % ophthalmic solution 1 Drop PRN   • benzocaine-menthol (CEPACOL) lozenge 1 Lozenge Q2HRS PRN   • mag hydrox-al hydrox-simeth (MAALOX PLUS ES or MYLANTA DS) suspension 20 mL Q2HRS PRN   • trazodone (DESYREL) tablet 50 mg QHS PRN   • sodium chloride (OCEAN) 0.65 % nasal spray 2 Spray PRN   • ondansetron (ZOFRAN ODT) dispertab 4 mg Q4HRS PRN   • acetaminophen (TYLENOL) tablet 650 mg Q4HRS PRN   • lorazepam (ATIVAN) tablet 1 mg QHS       Orders Placed This Encounter   Procedures   • Diet Order     Standing Status: Standing      Number of Occurrences: 1      Standing Expiration Date:      Order Specific Question:  Diet:     Answer:  Regular [1]         Assessment:  Active Hospital Problems    Diagnosis   • Debility   • Wrist pain   • Back pain   • Paraplegia (CMS-HCC)   • Chronic pain   • Depression   • Poor appetite   • T9 spinal cord injury (CMS-HCC)   • Failure to thrive (0-17)   • Leukopenia     I led and attended the weekly conference, and agree with the IDT conference documentation and plan of care as noted below.     Date of conference: 8/3/2017    Admission FIM 61 --> 79    CM/social support: DC destination/dispostion:  Patient lives in a single level apartment.    Referrals: Possible PAS program    DC Needs: Patient has a tub bench, w/c, cushion and cane.  He may need a new w/c and new AFO's.  We have referred patient for evaluation with Joan and Nan.  I have discussed home health follow up and he is okay with this.    Barriers to  "discharge:   Decreasing function.    Strengths: very motivated.    Anticipated DC date: 8/22/2017    Home health: PT/OT/RN    Equip: 2 new AFOs, new wheelchair, transfer pole in house    Follow up: PCP, SCI clinic    Therapy update 8/7/2017  PT - wc->edge of standard bed with bedrail: pt able to place slideboard with Min A,  however refused to implement \"head-hips\" principle resulting in L lateral LOB  requiring Mod A to return to upright sitting. Pt verbalized thinking that  transfer \"went well.\" Reviewed safety with transfers, head-hips principle and  need for independence to return home safely. Pt performed EOB to wc tx with  CGA without SB. See FIMs for wc mob and bed<>wc tx. pt impulsive with transfers this session. Pt verbalizes reception to PT's recommendations however apparent aversion to implementing suggestions.   OT - Pt able to sort clothing to place in washer. only assisted pt with controls of  washer and dryer unit. pt able to wash and dry own clothing with modified  independent wheelchair level. Pt motivated to managing his own laundry. Pt shown how to use washer and dryer and was able to problem solve best way to complete task. Pt also open to trying a variety of dressing techniques to assist with improving independence with self care tasks.      Medical Decision Making and Plan:    Labs reviewed. Medications reviewed.    T9 incomplete spinal cord injury -with no movement of right leg, 4/5 left leg, increased tone at knees, patchy sensation in both legs. Continue therapies. Being evaluated for new bilateral AFOs and new/modified wheelchair.     Debility/Failure to thrive - multifactorial. Continue full rehab program.    Pancytopenia - due to poor nutritional intake. Dietician to see patient.     Alcohol overuse - likely using alcohol for his anxiety. Dr. Dutton to consult.     Spasticity - Scheduled baclofen at night. Increased today. Start PRN TID baclofen during the day.    Bilateral wrist pain - " continue splint on left, for severe OA/ligament tear. Xray right with severe OA, soft tissue swelling, and evidence of old surgeries. S/p 4 days of motrin. Ice as needed. Improved.    Depression/anxiety - continue Remeron. Dr. Dutton consult.    Hypoalbuminemia/Poor appetite - likely due to above. Continue Remeron.    Insomnia - continue ativan at night which patient states helps his sleep. Trazodone as needed.    Cough - guaifenesin PRN. Incentive spirometry. Much improved.    Hyponatremia/hypokalemia - resolved. Monitor weekly.    Bowel incontinence - twice since admission, unclear etiology. Monitor. Started fiber. Now resolved. Stop fiber.    Blood on outside of stool x 2 - looks like hemorrhoids. 4 days of anusol. Continue to monitor. May need outpatient colonoscopy.    Bladder - continent.    DVT prophylaxis - not indicated as patient not a new paraplegic.      Total time:  >25 minutes.  I spent greater than 50% of the time for patient care and coordination on this date, including unit/floor time, and face-to-face time with the patient as per assessment and plan above.    Margo Bruce M.D.

## 2017-08-07 NOTE — PROGRESS NOTES
Bedside report received from night shift RN, POC discussed, pt awake in bed, denies pain, questions, or concerns at this time, no s/s distress noted, call light w/in reach, monitoring in progress.

## 2017-08-08 PROCEDURE — 700102 HCHG RX REV CODE 250 W/ 637 OVERRIDE(OP): Performed by: PHYSICAL MEDICINE & REHABILITATION

## 2017-08-08 PROCEDURE — 97535 SELF CARE MNGMENT TRAINING: CPT

## 2017-08-08 PROCEDURE — 97530 THERAPEUTIC ACTIVITIES: CPT

## 2017-08-08 PROCEDURE — A9270 NON-COVERED ITEM OR SERVICE: HCPCS | Performed by: PHYSICAL MEDICINE & REHABILITATION

## 2017-08-08 PROCEDURE — 97110 THERAPEUTIC EXERCISES: CPT

## 2017-08-08 PROCEDURE — 99232 SBSQ HOSP IP/OBS MODERATE 35: CPT | Performed by: PHYSICAL MEDICINE & REHABILITATION

## 2017-08-08 PROCEDURE — 770010 HCHG ROOM/CARE - REHAB SEMI PRIVAT*

## 2017-08-08 RX ADMIN — BACLOFEN 10 MG: 10 TABLET ORAL at 20:00

## 2017-08-08 RX ADMIN — LORAZEPAM 1 MG: 1 TABLET ORAL at 20:00

## 2017-08-08 RX ADMIN — TRAZODONE HYDROCHLORIDE 50 MG: 50 TABLET ORAL at 20:00

## 2017-08-08 RX ADMIN — POLYETHYLENE GLYCOL 3350 1 PACKET: 17 POWDER, FOR SOLUTION ORAL at 08:04

## 2017-08-08 RX ADMIN — Medication 2 TABLET: at 20:00

## 2017-08-08 RX ADMIN — MIRTAZAPINE 15 MG: 15 TABLET, ORALLY DISINTEGRATING ORAL at 20:00

## 2017-08-08 ASSESSMENT — PAIN SCALES - GENERAL
PAINLEVEL_OUTOF10: 0
PAINLEVEL_OUTOF10: 0

## 2017-08-08 NOTE — CARE PLAN
Problem: Safety  Goal: Will remain free from injury  Intervention: Provide assistance with mobility  Pt uses call light consistently and appropriately. Waits for assistance does not attempt self transfer this shift. Able to verbalize needs.     Problem: Bowel/Gastric:  Goal: Normal bowel function is maintained or improved  Outcome: PROGRESSING AS EXPECTED  This morning patient refused his hydrocortisone suppository. Pt. states that he will do it at night (just once a day).

## 2017-08-08 NOTE — CARE PLAN
Problem: Safety  Goal: Will remain free from falls  Intervention: Implement fall precautions  Use of call light encouraged to make needs known.Bed in low position, non skid socks/shoes on when out of bed.Call light and things within reach.Will continue to monitor and assess needs and safety.      Problem: Bowel/Gastric:  Goal: Normal bowel function is maintained or improved  Intervention: Educate patient and significant other/support system about signs and symptoms of constipation and interventions to implement  Scheduled bowel medication given.Continent of bowel per report.LB 08/07.Will continue to monitor and assess for s/s of constipation.      Problem: Pain Management  Goal: Pain level will decrease to patient’s comfort goal  Intervention: Educate and implement non-pharmacologic comfort measures. Examples: relaxation, distration, play therapy, activity therapy, massage, etc.  Pt c/o headache but refused any pain medication.Repositioned with pillows for comfort.Will continue to monitor and assess pain level and medicate as needed.

## 2017-08-08 NOTE — PROGRESS NOTES
Pt. reports to have migraine pain after dinner. Pt. states that it happens very rarely and refuses any medication for headache or any other intervention at this moment. Pt. was educated about availability of PRN medications. Pt. agreed to call RN if pain gets not manageable.
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
## 2017-08-09 PROCEDURE — 97112 NEUROMUSCULAR REEDUCATION: CPT

## 2017-08-09 PROCEDURE — 700102 HCHG RX REV CODE 250 W/ 637 OVERRIDE(OP): Performed by: PHYSICAL MEDICINE & REHABILITATION

## 2017-08-09 PROCEDURE — A9270 NON-COVERED ITEM OR SERVICE: HCPCS | Performed by: PHYSICAL MEDICINE & REHABILITATION

## 2017-08-09 PROCEDURE — 97535 SELF CARE MNGMENT TRAINING: CPT

## 2017-08-09 PROCEDURE — 770010 HCHG ROOM/CARE - REHAB SEMI PRIVAT*

## 2017-08-09 PROCEDURE — 99232 SBSQ HOSP IP/OBS MODERATE 35: CPT | Performed by: PHYSICAL MEDICINE & REHABILITATION

## 2017-08-09 PROCEDURE — 97110 THERAPEUTIC EXERCISES: CPT

## 2017-08-09 PROCEDURE — 97530 THERAPEUTIC ACTIVITIES: CPT

## 2017-08-09 RX ADMIN — Medication 2 TABLET: at 20:45

## 2017-08-09 RX ADMIN — BACLOFEN 10 MG: 10 TABLET ORAL at 20:45

## 2017-08-09 RX ADMIN — POLYETHYLENE GLYCOL 3350 1 PACKET: 17 POWDER, FOR SOLUTION ORAL at 08:06

## 2017-08-09 RX ADMIN — LORAZEPAM 1 MG: 1 TABLET ORAL at 20:45

## 2017-08-09 RX ADMIN — MIRTAZAPINE 15 MG: 15 TABLET, ORALLY DISINTEGRATING ORAL at 20:45

## 2017-08-09 RX ADMIN — TRAZODONE HYDROCHLORIDE 50 MG: 50 TABLET ORAL at 20:48

## 2017-08-09 ASSESSMENT — PAIN SCALES - GENERAL
PAINLEVEL_OUTOF10: 0
PAINLEVEL_OUTOF10: 0

## 2017-08-09 NOTE — PROGRESS NOTES
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 No acute events overnight. No blood on stool today. Has been taking the Anusol once daily. Evaluated a van yesterday - decided he needs one with a swivel seat entry system. WC parts to be in by late next week. Bilateral wrist pain improved - he used ice after therapy yesterday which helps. Also using his splints.    Objective:  VITAL SIGNS: /69 mmHg  Pulse 73  Temp(Src) 36.3 °C (97.3 °F)  Resp 20  Ht 1.93 m (6' 4\")  Wt 83 kg (182 lb 15.7 oz)  BMI 22.28 kg/m2  SpO2 96%  Gen: alert, no apparent distress  CV: mild tachycardia, regular rhythm, no murmurs, no peripheral edema  Resp: clear to ascultation bilaterally, normal respiratory effort  GI: soft, non-tender abdomen, bowel sounds present  Neuro: notable for flaccid right leg with muscular atrophy, increased tone at the knee, MMT- left hip, knee, and ankle 4 out of 5, patchy sensory changes in the bilateral legs    No results found for this or any previous visit (from the past 72 hour(s)).    Current Facility-Administered Medications   Medication Frequency   • hydrocortisone (ANUSOL-HC) suppository 25 mg Q12HRS   • calcium polycarbophil (FIBERCON) tablet 625 mg TID PRN   • baclofen (LIORESAL) tablet 10 mg TID PRN   • senna-docusate (PERICOLACE or SENOKOT S) 8.6-50 MG per tablet 2 Tab Q EVENING    And   • polyethylene glycol/lytes (MIRALAX) PACKET 1 Packet DAILY    And   • magnesium hydroxide (MILK OF MAGNESIA) suspension 30 mL QDAY PRN   • lactulose 20 GM/30ML solution 30 mL QDAY PRN   • bisacodyl (DULCOLAX) suppository 10 mg QDAY PRN   • oxycodone immediate-release (ROXICODONE) tablet 5 mg Q4HRS PRN   • baclofen (LIORESAL) tablet 10 mg QHS   • mirtazapine (REMERON) orally disintegrating tab 15 mg QHS   • guaiFENesin (ROBITUSSIN) 100 MG/5ML solution 200 " mg Q4HRS PRN   • Respiratory Care per Protocol Continuous RT   • Pharmacy Consult Request ...Pain Management Review 1 Each PRN   • tramadol (ULTRAM) 50 MG tablet 50 mg Q4HRS PRN   • acetaminophen (TYLENOL) tablet 650 mg Q4HRS PRN   • artificial tears 1.4 % ophthalmic solution 1 Drop PRN   • benzocaine-menthol (CEPACOL) lozenge 1 Lozenge Q2HRS PRN   • mag hydrox-al hydrox-simeth (MAALOX PLUS ES or MYLANTA DS) suspension 20 mL Q2HRS PRN   • trazodone (DESYREL) tablet 50 mg QHS PRN   • sodium chloride (OCEAN) 0.65 % nasal spray 2 Spray PRN   • ondansetron (ZOFRAN ODT) dispertab 4 mg Q4HRS PRN   • acetaminophen (TYLENOL) tablet 650 mg Q4HRS PRN   • lorazepam (ATIVAN) tablet 1 mg QHS       Orders Placed This Encounter   Procedures   • Diet Order     Standing Status: Standing      Number of Occurrences: 1      Standing Expiration Date:      Order Specific Question:  Diet:     Answer:  Regular [1]         Assessment:  Active Hospital Problems    Diagnosis   • Debility   • Wrist pain   • Back pain   • Paraplegia (CMS-HCC)   • Chronic pain   • Depression   • Poor appetite   • T9 spinal cord injury (CMS-HCC)   • Failure to thrive (0-17)   • Leukopenia     I led and attended the weekly conference, and agree with the IDT conference documentation and plan of care as noted below.     Date of conference: 8/3/2017    Admission FIM 61 --> 79    CM/social support: DC destination/dispostion:  Patient lives in a single level apartment.    Referrals: Possible PAS program    DC Needs: Patient has a tub bench, w/c, cushion and cane.  He may need a new w/c and new AFO's.  We have referred patient for evaluation with Joan and Nan.  I have discussed home health follow up and he is okay with this.    Barriers to discharge:   Decreasing function.    Strengths: very motivated.    Anticipated DC date: 8/22/2017    Home health: PT/OT/RN    Equip: 2 new AFOs, new wheelchair, transfer pole in house    Follow up: PCP, SCI clinic    Therapy  "update 8/9/2017  PT - Patient was frustrated when asked to review transfer training, but was  receptive to standing frame and initiating . Patient was frustrated by  the current wc he is in and reported that Joan has his personal wc, and  will not receive the parts they need until the end of next week. Patient  reported wrist pain and was icing L wrist upon PT arrival.  [employer] brought adaptive van with \"kneeling\" feature and power folding ramp  for pt inspection and to assess if wc will fit on ramp. Pt's ultralightweight  wc does fit on ramp. Pt will likey need a power/swivel drivers seat and  possibly hand controls to safely transfer and operate van.  PT - w/c>mat slide board transfer from pt's left side 1 time with even surface with  SBA Pt required VC to keep fingers out from under the board. Pt transferred  again from w/c> mat slightly uphill, verbalizing and demonstrating leaning  technique to the right when transferring to the left as instructed from PT. Pt  reported it was an easier transfer with this technique. Pt transferred from  mat>w/c from slightly uphill position without slide board and SBA and  demonstrated mini pop over transfer, clearing his butt as he transferred and  pt reported he beared weight through his left foot. Pt accessed computer to  research light weight slide transfer boards; pt interested in for possible  home use.     Medical Decision Making and Plan:    Labs reviewed. Medications reviewed.    T9 incomplete spinal cord injury -with no movement of right leg, 4/5 left leg, increased tone at knees, patchy sensation in both legs. Continue therapies. Being evaluated for new bilateral AFOs and new/modified wheelchair. Also in the process of buying a van that is WC accessible.    Debility/Failure to thrive - multifactorial. Continue full rehab program. Much improved.     Pancytopenia - due to poor nutritional intake. Dietician to see patient.     Alcohol overuse - likely using " alcohol for his anxiety. No withdrawal issues since admission.    Spasticity - Scheduled baclofen at night. Start PRN TID baclofen during the day for days when he has increasing spasms. Currently under control.    Bilateral wrist pain - continue PRN splint on left, for severe OA/ligament tear. Xray right with severe OA, soft tissue swelling, and evidence of old surgeries. S/p 4 days of motrin. Ice as needed. Improved.    Depression/anxiety - continue Remeron. Mood improved.     Hypoalbuminemia/Poor appetite - likely due to above. Continue Remeron.    Insomnia - continue ativan at night which patient states helps his sleep. Trazodone as needed.    Hyponatremia/hypokalemia - resolved. Monitor weekly.    Bowel incontinence - twice since admission, unclear etiology. Monitor. Started fiber. Now resolved. Stopped fiber.    Blood on outside of stool x 2 - looks like hemorrhoids. 4 days of anusol. Continue to monitor. May need outpatient colonoscopy.    Bladder - continent.    DVT prophylaxis - not indicated as patient not a new paraplegic.      Total time:  >25 minutes.  I spent greater than 50% of the time for patient care and coordination on this date, including unit/floor time, and face-to-face time with the patient as per assessment and plan above.    Margo Bruce M.D.

## 2017-08-09 NOTE — CARE PLAN
Problem: Safety  Goal: Will remain free from injury  Outcome: PROGRESSING AS EXPECTED  Patient uses call light appropriately. Bed locked and in the lowest position. Non skid socks in place.     Problem: Bowel/Gastric:  Goal: Will not experience complications related to bowel motility  Intervention: Assess baseline bowel pattern  This morning patient refused his hydrocortisone suppository, stating that he doesn't need it anymore. Will follow up.

## 2017-08-09 NOTE — REHAB-PHARMACY IDT TEAM NOTE
Pharmacy  Pharmacy  Antibiotics/Antifungals/Antivirals:  Medication:      Active Orders     None        Route:         None  Stop Date:  N/A  Reason:   Antihypertensives/Cardiac:  Medication:    Orders     None        Patient Vitals for the past 24 hrs:   BP Pulse   08/09/17 0720 113/69 mmHg 73   08/08/17 1930 119/69 mmHg 78       Anticoagulation:  Medication:  Not applicable  INR:      Other key medications:         A review of the medication list reveals no issues at this time.      Section completed by:  Carroll Ricks RPH

## 2017-08-09 NOTE — PROGRESS NOTES
"Rehab Progress Note     Chief complaint: migraine, follow up history of T9 spinal cord injury, debility/failure to thrive  Date of Service: 8/8/2017    Interval Events (Subjective)  Patient seen and examined. Patient with mild early migraine last night with aura, which he has had since a child. Didn't take any medications, and didn't escalate. No more blood on stools - just started Anusol. Patient reports he hasn't had a colonoscopy. He reports he doesn't want to see Dr. Dutton as he didn't like his line of questioning about asking the doctor for explanation regarding his spasms/pain. Patient reports therapy continues to go well. He's impatient about getting his WC back from [facility] because then he wants to schedule a time to trial a van prototype, and he needs to WC for this. Pain in the bilateral wrists is chronic, but currently well controlled. No longer having leg spasms.    Objective:  VITAL SIGNS: /68 mmHg  Pulse 70  Temp(Src) 36.7 °C (98 °F)  Resp 17  Ht 1.93 m (6' 4\")  Wt 83 kg (182 lb 15.7 oz)  BMI 22.28 kg/m2  SpO2 98%  Gen: alert, no apparent distress  CV: mild tachycardia, regular rhythm, no murmurs, no peripheral edema  Resp: clear to ascultation bilaterally, normal respiratory effort  GI: soft, non-tender abdomen, bowel sounds present  Neuro: notable for flaccid right leg with muscular atrophy, increased tone at the knee, MMT- left hip, knee, and ankle 4 out of 5, patchy sensory changes in the bilateral legs    Recent Results (from the past 72 hour(s))   POTASSIUM SERUM (K)    Collection Time: 08/06/17  5:30 AM   Result Value Ref Range    Potassium 3.8 3.6 - 5.5 mmol/L       Current Facility-Administered Medications   Medication Frequency   • hydrocortisone (ANUSOL-HC) suppository 25 mg Q12HRS   • calcium polycarbophil (FIBERCON) tablet 625 mg TID PRN   • baclofen (LIORESAL) tablet 10 mg TID PRN   • senna-docusate (PERICOLACE or SENOKOT S) 8.6-50 MG per tablet 2 Tab Q EVENING    And   • " polyethylene glycol/lytes (MIRALAX) PACKET 1 Packet DAILY    And   • magnesium hydroxide (MILK OF MAGNESIA) suspension 30 mL QDAY PRN   • lactulose 20 GM/30ML solution 30 mL QDAY PRN   • bisacodyl (DULCOLAX) suppository 10 mg QDAY PRN   • oxycodone immediate-release (ROXICODONE) tablet 5 mg Q4HRS PRN   • baclofen (LIORESAL) tablet 10 mg QHS   • mirtazapine (REMERON) orally disintegrating tab 15 mg QHS   • guaiFENesin (ROBITUSSIN) 100 MG/5ML solution 200 mg Q4HRS PRN   • Respiratory Care per Protocol Continuous RT   • Pharmacy Consult Request ...Pain Management Review 1 Each PRN   • tramadol (ULTRAM) 50 MG tablet 50 mg Q4HRS PRN   • acetaminophen (TYLENOL) tablet 650 mg Q4HRS PRN   • artificial tears 1.4 % ophthalmic solution 1 Drop PRN   • benzocaine-menthol (CEPACOL) lozenge 1 Lozenge Q2HRS PRN   • mag hydrox-al hydrox-simeth (MAALOX PLUS ES or MYLANTA DS) suspension 20 mL Q2HRS PRN   • trazodone (DESYREL) tablet 50 mg QHS PRN   • sodium chloride (OCEAN) 0.65 % nasal spray 2 Spray PRN   • ondansetron (ZOFRAN ODT) dispertab 4 mg Q4HRS PRN   • acetaminophen (TYLENOL) tablet 650 mg Q4HRS PRN   • lorazepam (ATIVAN) tablet 1 mg QHS       Orders Placed This Encounter   Procedures   • Diet Order     Standing Status: Standing      Number of Occurrences: 1      Standing Expiration Date:      Order Specific Question:  Diet:     Answer:  Regular [1]         Assessment:  Active Hospital Problems    Diagnosis   • Debility   • Wrist pain   • Back pain   • Paraplegia (CMS-HCC)   • Chronic pain   • Depression   • Poor appetite   • T9 spinal cord injury (CMS-HCC)   • Failure to thrive (0-17)   • Leukopenia     I led and attended the weekly conference, and agree with the IDT conference documentation and plan of care as noted below.     Date of conference: 8/3/2017    Admission FIM 61 --> 79    CM/social support: DC destination/dispostion:  Patient lives in a single level apartment.    Referrals: Possible PAS program    DC Needs:  Patient has a tub bench, w/c, cushion and cane.  He may need a new w/c and new AFO's.  We have referred patient for evaluation with Joan and Nan.  I have discussed home health follow up and he is okay with this.    Barriers to discharge:   Decreasing function.    Strengths: very motivated.    Anticipated DC date: 8/22/2017    Home health: PT/OT/RN    Equip: 2 new AFOs, new wheelchair, transfer pole in house    Follow up: PCP, SCI clinic    Medical Decision Making and Plan:    Labs reviewed. Medications reviewed.    T9 incomplete spinal cord injury -with no movement of right leg, 4/5 left leg, increased tone at knees, patchy sensation in both legs. Continue therapies. Being evaluated for new bilateral AFOs and new/modified wheelchair. Also in the process of buying a van that is WC accessible.    Debility/Failure to thrive - multifactorial. Continue full rehab program. Much improved.     Pancytopenia - due to poor nutritional intake. Dietician to see patient.     Alcohol overuse - likely using alcohol for his anxiety. Dr. Dutton to consult. - patient doesn't want to see Dr. Dutton.     Spasticity - Scheduled baclofen at night. Start PRN TID baclofen during the day for days when he has increasing spasms. Currently under control.    Bilateral wrist pain - continue PRN splint on left, for severe OA/ligament tear. Xray right with severe OA, soft tissue swelling, and evidence of old surgeries. S/p 4 days of motrin. Ice as needed. Improved.    Depression/anxiety - continue Remeron.     Hypoalbuminemia/Poor appetite - likely due to above. Continue Remeron.    Insomnia - continue ativan at night which patient states helps his sleep. Trazodone as needed.    Cough - guaifenesin PRN. Incentive spirometry. Resolved.    Hyponatremia/hypokalemia - resolved. Monitor weekly.    Bowel incontinence - twice since admission, unclear etiology. Monitor. Started fiber. Now resolved. Stopped fiber.    Blood on outside of stool x 2  - looks like hemorrhoids. 4 days of anusol. Continue to monitor. May need outpatient colonoscopy.    Bladder - continent.    DVT prophylaxis - not indicated as patient not a new paraplegic.      Total time:  >25 minutes.  I spent greater than 50% of the time for patient care and coordination on this date, including unit/floor time, and face-to-face time with the patient as per assessment and plan above.    Margo Bruce M.D.

## 2017-08-09 NOTE — CARE PLAN
Problem: Mobility Transfers  Goal: STG-Within one week, patient will perform bed mobility  1) Individualized goal: Patient will perform bed mobility SPV consistently with use of bed rails  2) Interventions: PT E Stim Attended, PT Orthotics Training, PT Gait Training, PT Self Care/Home Eval, PT Therapeutic Exercises, PT Neuro Re-Ed/Balance, PT Aquatic Therapy, PT Therapeutic Activity, PT Manual Therapy and PT Evaluation.  Outcome: NOT MET  Inconsistent      Goal: STG-Within one week, patient will transfer bed to chair  1) Individualized goal: With SBA with AD  2) Interventions: PT E Stim Attended, PT Orthotics Training, PT Gait Training, PT Self Care/Home Eval, PT Therapeutic Exercises, PT TENS Application, PT Neuro Re-Ed/Balance, PT Therapeutic Activity, PT Manual Therapy and PT Evaluation  Outcome: NOT MET  CGA-mod A (inconsistent)

## 2017-08-09 NOTE — REHAB-PT IDT TEAM NOTE
Physical Therapy  Mobility  Bed mobility:  Inconsistent (Mod A-Mod I per recent FIMs)  Bed /Chair/Wheelchair Transfer Initial:  1 - Total Assistance  Bed /Chair/Wheelchair Transfer Current:  3 - Moderate Assistance   Bed/Chair/Wheelchair Transfer Description:   (wc->bed with SB and Mod A for sitting balance ; sit<>supine with MOd A; EOB to wc with CGA. Pt impulsive with tx this session. )  Walk Initial:  0 - Not tested, patient refused  Walk Current:  0 - Not tested,medical condition   Walk Description:     Wheelchair Initial:  6 - Modified Independent  Wheelchair Current:  6 - Modified Independent   Wheelchair Description:   (Mod I on inside surfaces, SBA on outside surfaces)  Stairs Initial:  0 - Not tested,medical condition  Stairs Current: 0 - Not tested,medical condition   Stairs Description:    Patient/Family Training/Education:  wc consult, fitted for B AFOs, transfers, review of pressure relief and stretching  DME/DC Recommendations:  HH PT vs outpatient; modifications to ultralightweight wc in progress; has been fitted for B AFOs, possibly needs a new adaptive car with mechanical lift; transfer pole vs bedrail.  Strengths:  Able to follow instructions, Alert and oriented, Effective communication skills, Good carryover of learning, Good insight into deficits/needs, Independent PLOF, Making steady progress towards goals, Motivated for self care and independence, Pleasant and cooperative and Willingly participates in therapeutic activities  Barriers:   Other: para, B wrist pain, hx of falls, lives alone  # of short term goals set= 2  # of short term goals met= 0       Physical Therapy Problems           Problem: Mobility Transfers     Dates: Start: 07/26/17       Goal: STG-Within one week, patient will perform bed mobility     Dates: Start: 07/26/17      Description: 1) Individualized goal:  Patient will perform bed mobility SPV consistently with use of bed rails    2) Interventions:  PT E Stim Attended, PT  Orthotics Training, PT Gait Training, PT Self Care/Home Eval, PT Therapeutic Exercises, PT Neuro Re-Ed/Balance, PT Aquatic Therapy, PT Therapeutic Activity, PT Manual Therapy and PT Evaluation.         Note: Goal Note filed on 08/09/17 1649 by Michelle Talavera DPT    Goal: STG-Within one week, patient will perform bed mobility  Outcome: NOT MET  Inconsistent              Goal: STG-Within one week, patient will transfer bed to chair     Dates: Start: 08/03/17   Expected End: 08/10/17      Description: 1) Individualized goal:  With SBA with AD    2) Interventions:  PT E Stim Attended, PT Orthotics Training, PT Gait Training, PT Self Care/Home Eval, PT Therapeutic Exercises, PT TENS Application, PT Neuro Re-Ed/Balance, PT Therapeutic Activity, PT Manual Therapy and PT Evaluation        Note: Goal Note filed on 08/09/17 1649 by Michelle Talavera DPT    Goal: STG-Within one week, patient will transfer bed to chair  Outcome: NOT MET  CGA-mod A (inconsistent)            Problem: PT-Long Term Goals     Dates: Start: 07/26/17       Goal: LTG-By discharge, patient will transfer one surface to another     Dates: Start: 07/26/17      Description: 1) Individualized goal:  Patient will transfer mod I wc <> bed/ mod I bed mobility     2) Interventions:  PT E Stim Attended, PT Orthotics Training, PT Gait Training, PT Self Care/Home Eval, PT Therapeutic Exercises, PT Neuro Re-Ed/Balance, PT Aquatic Therapy, PT Therapeutic Activity, PT Manual Therapy and PT Evaluation.            Goal: LTG-By discharge, patient will propel wheelchair     Dates: Start: 07/26/17      Description: 1) Individualized goal:  Patient will propel wc mod I indoors and outdoors over various inclines and surfaces >300 ft.    2) Interventions:  PT E Stim Attended, PT Orthotics Training, PT Gait Training, PT Self Care/Home Eval, PT Therapeutic Exercises, PT Neuro Re-Ed/Balance, PT Aquatic Therapy, PT Therapeutic Activity, PT Manual Therapy and PT Evaluation.                     Section completed by:  Michelle Talavera PT, DPT

## 2017-08-09 NOTE — CARE PLAN
Problem: Safety  Goal: Will remain free from injury  Outcome: PROGRESSING AS EXPECTED  Pt. Uses call light within reach, stayed in bed all night. Good safety awareness noted but continue hourly rounding for safety.    Problem: Pain Management  Goal: Pain level will decrease to patient’s comfort goal  Outcome: PROGRESSING AS EXPECTED  Pt. Resting comfortably, pain is controlled at this time, no complaints made, repositioned in bed for comfort. Continue monitor condition.

## 2017-08-10 LAB
EST. AVERAGE GLUCOSE BLD GHB EST-MCNC: 100 MG/DL
HBA1C MFR BLD: 5.1 % (ref 0–5.6)

## 2017-08-10 PROCEDURE — 36415 COLL VENOUS BLD VENIPUNCTURE: CPT

## 2017-08-10 PROCEDURE — 99233 SBSQ HOSP IP/OBS HIGH 50: CPT | Performed by: PHYSICAL MEDICINE & REHABILITATION

## 2017-08-10 PROCEDURE — 700102 HCHG RX REV CODE 250 W/ 637 OVERRIDE(OP): Performed by: PHYSICAL MEDICINE & REHABILITATION

## 2017-08-10 PROCEDURE — A9270 NON-COVERED ITEM OR SERVICE: HCPCS | Performed by: PHYSICAL MEDICINE & REHABILITATION

## 2017-08-10 PROCEDURE — 770010 HCHG ROOM/CARE - REHAB SEMI PRIVAT*

## 2017-08-10 PROCEDURE — 97535 SELF CARE MNGMENT TRAINING: CPT

## 2017-08-10 PROCEDURE — 97110 THERAPEUTIC EXERCISES: CPT

## 2017-08-10 PROCEDURE — 97530 THERAPEUTIC ACTIVITIES: CPT

## 2017-08-10 PROCEDURE — 83036 HEMOGLOBIN GLYCOSYLATED A1C: CPT

## 2017-08-10 RX ADMIN — Medication 2 TABLET: at 20:13

## 2017-08-10 RX ADMIN — LORAZEPAM 1 MG: 1 TABLET ORAL at 20:13

## 2017-08-10 RX ADMIN — MIRTAZAPINE 15 MG: 15 TABLET, ORALLY DISINTEGRATING ORAL at 20:13

## 2017-08-10 RX ADMIN — BACLOFEN 10 MG: 10 TABLET ORAL at 20:12

## 2017-08-10 ASSESSMENT — PAIN SCALES - GENERAL
PAINLEVEL_OUTOF10: 0
PAINLEVEL_OUTOF10: 0

## 2017-08-10 NOTE — CARE PLAN
Problem: Bathing  Goal: STG-Within one week, patient will bathe  1) Individualized Goal: With mod I using AE and DME prn  2) Interventions: OT Orthotics Training, OT E Stim Attended, OT Self Care/ADL, OT Cognitive Skill Dev, OT Community Reintegration, OT Manual Ther Technique, OT Neuro Re-Ed/Balance, OT Sensory Int Techniques, OT Therapeutic Activity, OT Evaluation and OT Therapeutic Exercise  Outcome: NOT MET    Problem: Functional Transfers  Goal: STG-Within one week, patient will transfer to toilet  1) Individualized Goal: With mod A using DME and v/c  2) Interventions: OT Orthotics Training, OT E Stim Attended, OT Self Care/ADL, OT Cognitive Skill Dev, OT Community Reintegration, OT Manual Ther Technique, OT Neuro Re-Ed/Balance, OT Sensory Int Techniques, OT Therapeutic Activity, OT Evaluation and OT Therapeutic Exercise   Outcome: MET Date Met:  08/10/17  Goal: STG-Within one week, patient will transfer to step in shower  1) Individualized Goal: With mod A using DME prn  2) Interventions: OT Orthotics Training, OT E Stim Attended, OT Self Care/ADL, OT Cognitive Skill Dev, OT Community Reintegration, OT Manual Ther Technique, OT Neuro Re-Ed/Balance, OT Sensory Int Techniques, OT Therapeutic Activity, OT Evaluation and OT Therapeutic Exercise   Outcome: MET Date Met:  08/10/17

## 2017-08-10 NOTE — REHAB-NURSING IDT TEAM NOTE
Nursing  Nursing  Diet/Nutrition:  Regular and Thin Liquids  Medication Administration:  Whole with Liquid Wash  % consumed at meals in last 24 hours:  Consumed % of meals per documentation.  Breakfast:  100%   Lunch:  100%  Dinner:  95%   Snack schedule:  HS  Supplement:  Boost Plus  Appetite:  Excellent  Fluid Intake/Output in past 24 hours: In: 360 [P.O.:360]  Out: 1390   Admit Weight:  Weight: 83.008 kg (183 lb)  Weight Last 7 Days       Pain Issues:    Location:  --  --         Severity:  Denies   Scheduled pain medications:  None   Baclofen  PRN pain medications used in last 24 hours:  None   Non Pharmacologic Interventions:  relaxation and rest  Effectiveness of pain management plan:  good=patient states acceptable comfort after interventions    Bowel:    Bowel Assist Initial Score:  2 - Max Assistance  Bowel Assist Current Score:  4 - Minimal Assistance  Bowl Accidents in last 7 days:  1  Last bowel movement: 08/09/17  Stool: Medium, Formed     Usual bowel pattern:  daily  Scheduled bowel medications:  senna-docusate (PERICOLACE or SENOKOT S)  and polyethylene glycol/lytes (MIRALAX)   PRN bowel medications used in last 24 hours:  None  Nursing Interventions:  Increased time, Scheduled medication, Supervision  Effectiveness of bowel program:   good=regular, predictable, controlled emptying of bowel  Bladder:    Bladder Assist Initial Score:  5 - Standby Prompting/Supervision or Set-up  Bladder Assist Current Score:  4 - Minimal Assistance  Bladder Accidents in last 7 days:  1  Medications affecting bladder:  None    Time void schedule/voiding pattern:  Time void every 3 hours during the day and every 4 hours at night  Interventions:  Increased time, Emptying of device, Urinal  Effectiveness of bladder training:  Good=regular, predictable, emptying of bladder, patient initiates time voiding    Wound:         Patient Lines/Drains/Airways Status    Active Current Wounds     Name: Placement date: Placement  time: Site: Days:    Wound Not POA Skin Tear Forearm Right 07/22/17  2230   18                   Interventions:  observation  Effectiveness of intervention:  wound is improving     Sleep/wake cycle:   Average hours slept:  Sleeps 4-6 hours without waking  Sleep medication usage:  None    Patient/Family Training/Education:  Bladder Management/Training, Bowel Management/Training, Fall Prevention, General Self Care, Medication Management, Safe Transfers, Safety and Skin Care  Discharge Supply Recommendations:  Blood Pressure Monitor  Strengths: Alert and oriented, Willingly participates in therapeutic activities, Able to follow instructions, Supportive family, Pleasant and cooperative, Effective communication skills, Good carryover of learning, Motivated for self care and independence and Good endurance   Barriers:   Generalized weakness, Hypertension and Limited mobility            Nursing Problems           Problem: Bowel/Gastric:     Goal: Normal bowel function is maintained or improved     Flowsheet: Taken at 07/30/17 1840    Last BM 07/30/17 by Teresa Frye       Goal: Will not experience complications related to bowel motility           Problem: Discharge Barriers/Planning     Goal: Patient's continuum of care needs will be met           Problem: Infection     Goal: Will remain free from infection           Problem: Knowledge Deficit     Goal: Knowledge of disease process/condition, treatment plan, diagnostic tests, and medications will improve         Goal: Knowledge of the prescribed therapeutic regimen will improve           Problem: Mobility     Goal: Risk for activity intolerance will decrease           Problem: Pain Management     Goal: Pain level will decrease to patient's comfort goal           Problem: Safety     Goal: Will remain free from injury         Goal: Will remain free from falls           Problem: Skin Integrity     Goal: Risk for impaired skin integrity will decrease           Problem: Venous  Thromboembolism (VTW)/Deep Vein Thrombosis (DVT) Prevention:     Goal: Patient will participate in Venous Thrombosis (VTE)/Deep Vein Thrombosis (DVT)Prevention Measures                Long Term Goals:   At discharge patient will be able to function safely at home and in the community with support.    Section completed by:  Mary Miller R.N.

## 2017-08-10 NOTE — REHAB-CM IDT TEAM NOTE
Case Management   DC Planning    DC destination/dispostion:  Patient lives in a single level apartment.    Referrals: Possible PAS program    DC Needs: Patient has a tub bench, w/c, cushion and cane.  We have referred patient for evaluation with Joan and Nan.  Joan is repairing patient's w/c.  Home eval completed and home modifications are in the works.  I have discussed home health follow up and he is okay with [facility].    Barriers to discharge:   Decreasing function.    Strengths: very motivated.    Section completed by:  Ida Hays R.N.

## 2017-08-10 NOTE — REHAB-ACTIVITY IDT TEAM NOTE
ACT  Recreation/Community     Leisure Competence Measure  Leisure Awareness: Supervision  Leisure Attitude: Modified Independent  Leisure Skills: Supervision  Cultural / Social Behaviors: Supervision  Interpersonal Skills: Supervision  Community Integration Skills:  (not yet assessed)  Social Contact: Modified Independent         Recreation Therapy Problems           Problem: Recreation Therapy     Dates: Start: 08/08/17       Goal: STG-Within one week, patient will     Dates: Start: 08/08/17      Description: Investigate new or previous leisure pursuits to resume post discharge        Goal: LTG-By discharge, patient will     Dates: Start: 08/08/17      Description: identify 2 new leisure pursuits for use post discharge              Strengths:  Cooperative, familiar with community resources  Barriers:   Sometimes resistant to new ideas/information, bilateral wrist pain    Section completed by:  Lara Briceno, CTRS
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
## 2017-08-10 NOTE — PROGRESS NOTES
"Rehab Progress Note     Chief complaint: none, follow up history of T9 spinal cord injury, debility/failure to thrive  Date of Service: 8/10/2017    Interval Events (Subjective)  Patient seen and examined. No acute events overnight. Patient very happy about his home evaluation today with therapy, [facility] from [facility], and the  from his building. They measured and discussed all the home changes that need to occur for a safe discharge home. Patient reports he was very anxious prior to the evaluation, but now he feels better/hopeful that it's going to work out ok.      Objective:  VITAL SIGNS: /64 mmHg  Pulse 72  Temp(Src) 36.7 °C (98 °F)  Resp 14  Ht 1.93 m (6' 4\")  Wt 83 kg (182 lb 15.7 oz)  BMI 22.28 kg/m2  SpO2 97%  Gen: alert, no apparent distress  CV: mild tachycardia, regular rhythm, no murmurs, no peripheral edema  Resp: clear to ascultation bilaterally, normal respiratory effort  GI: soft, non-tender abdomen, bowel sounds present  Neuro: notable for flaccid right leg with muscular atrophy, increased tone at the knee, MMT- left hip, knee, and ankle 4 out of 5, patchy sensory changes in the bilateral legs    No results found for this or any previous visit (from the past 72 hour(s)).    Current Facility-Administered Medications   Medication Frequency   • hydrocortisone (ANUSOL-HC) suppository 25 mg Q12HRS   • calcium polycarbophil (FIBERCON) tablet 625 mg TID PRN   • baclofen (LIORESAL) tablet 10 mg TID PRN   • senna-docusate (PERICOLACE or SENOKOT S) 8.6-50 MG per tablet 2 Tab Q EVENING    And   • polyethylene glycol/lytes (MIRALAX) PACKET 1 Packet DAILY    And   • magnesium hydroxide (MILK OF MAGNESIA) suspension 30 mL QDAY PRN   • lactulose 20 GM/30ML solution 30 mL QDAY PRN   • bisacodyl (DULCOLAX) suppository 10 mg QDAY PRN   • oxycodone immediate-release (ROXICODONE) tablet 5 mg Q4HRS PRN   • baclofen (LIORESAL) tablet 10 mg QHS   • mirtazapine (REMERON) orally " disintegrating tab 15 mg QHS   • guaiFENesin (ROBITUSSIN) 100 MG/5ML solution 200 mg Q4HRS PRN   • Respiratory Care per Protocol Continuous RT   • Pharmacy Consult Request ...Pain Management Review 1 Each PRN   • tramadol (ULTRAM) 50 MG tablet 50 mg Q4HRS PRN   • acetaminophen (TYLENOL) tablet 650 mg Q4HRS PRN   • artificial tears 1.4 % ophthalmic solution 1 Drop PRN   • benzocaine-menthol (CEPACOL) lozenge 1 Lozenge Q2HRS PRN   • mag hydrox-al hydrox-simeth (MAALOX PLUS ES or MYLANTA DS) suspension 20 mL Q2HRS PRN   • trazodone (DESYREL) tablet 50 mg QHS PRN   • sodium chloride (OCEAN) 0.65 % nasal spray 2 Spray PRN   • ondansetron (ZOFRAN ODT) dispertab 4 mg Q4HRS PRN   • acetaminophen (TYLENOL) tablet 650 mg Q4HRS PRN   • lorazepam (ATIVAN) tablet 1 mg QHS       Orders Placed This Encounter   Procedures   • Diet Order     Standing Status: Standing      Number of Occurrences: 1      Standing Expiration Date:      Order Specific Question:  Diet:     Answer:  Regular [1]       Assessment:  Active Hospital Problems    Diagnosis   • Debility   • Wrist pain   • Back pain   • Paraplegia (CMS-HCC)   • Chronic pain   • Depression   • Poor appetite   • T9 spinal cord injury (CMS-HCC)   • Failure to thrive (0-17)   • Leukopenia     I led and attended the weekly conference today, and agree with the IDT conference documentation and plan of care as noted below.    Date of conference: 8/10/2017    RN issues: Eating % of meals, no pain issues, min assist for bowel and bladder, skin tear on forearm improving, Strengths: Alert and oriented, Willingly participates in therapeutic activities, Able to follow instructions, Supportive family, Pleasant and cooperative, Effective communication skills, Good carryover of learning, Motivated for self care and independence and Good endurance   Barriers:   Generalized weakness, Hypertension and Limited mobility       PT: mod assist for transfers, unable to ambulate, standing in stander,  modified indep WC propulsion, Patient/Family Training/Education: wc consult, fitted for B AFOs, transfers, review of pressure relief and stretching  DME/DC Recommendations:  HH PT vs outpatient; modifications to ultralightweight wc in progress; has been fitted for B AFOs, possibly needs a new adaptive car with mechanical lift; transfer pole vs bedrail.  Strengths:  Able to follow instructions, Alert and oriented, Effective communication skills, Good carryover of learning, Good insight into deficits/needs, Independent PLOF, Making steady progress towards goals, Motivated for self care and independence, Pleasant and cooperative and Willingly participates in therapeutic activities  Barriers:   Other: para, B wrist pain, hx of falls, lives alone  # of short term goals set= 2  # of short term goals met= 0    OT: mod Indep eating, standby assist grooming/bathing/dressing, min assist lower body dressing, max assist toileting, min assist toilet transfer/shower transfer, DME/DC Recommendations:  Has transfer bench    Strengths:  Able to follow instructions, Making steady progress towards goals, Motivated for self care and independence, Pleasant and cooperative and Willingly participates in therapeutic activities  Barriers:  Decreased endurance, Generalized weakness, Limited mobility and Poor balance, pain in bilateral hands/wrist with prior injuries, difficulty accepting new transfer techniques     # of short term goals set= 3   # of short term goals met= 2     Recreational therapy: going to spinal cord support group on the 16th    Admission FIM 61 --> 79    CM/social support: DC destination/dispostion:  Patient lives in a single level apartment.    Referrals: Possible PAS program    DC Needs: Patient has a tub bench, w/c, cushion and cane.  He may need a new w/c and new AFO's.  We have referred patient for evaluation with Joan and Nan.  I have discussed home health follow up and he is okay with this.    Barriers to  discharge: Decreasing function.    Strengths: very motivated.    Anticipated DC date: 8/22/2017, as long as home modifications have been made and  has arrived for practice    Home health: PT/OT/RN    Equip: 2 new AFOs, new wheelchair, transfer pole in house, hospital bed?    Follow up: PCP, SCI clinic    Medical Decision Making and Plan:    Labs reviewed. Medications reviewed.    T9 incomplete spinal cord injury -with no movement of right leg, 4/5 left leg, increased tone at knees, patchy sensation in both legs. Continue therapies. Being evaluated for new bilateral AFOs and new/modified wheelchair. Also in the process of buying a van that is  accessible.    Debility/Failure to thrive - multifactorial. Continue full rehab program. Much improved.     Pancytopenia - due to poor nutritional intake. Dietician to see patient.     Alcohol overuse - likely using alcohol for his anxiety. No withdrawal issues since admission.    Spasticity - Scheduled baclofen at night. Start PRN TID baclofen during the day for days when he has increasing spasms. Currently under control.    Bilateral wrist pain - continue PRN splint on left, for severe OA/ligament tear. Xray right with severe OA, soft tissue swelling, and evidence of old surgeries. S/p 4 days of motrin. Ice as needed. Improved.    Depression/anxiety - continue Remeron. Mood improved.     Hypoalbuminemia/Poor appetite - likely due to above. Continue Remeron.    Insomnia - continue ativan at night which patient states helps his sleep. Trazodone as needed.    Hyponatremia/hypokalemia - resolved. Monitor weekly.    Bowel incontinence - twice since admission, unclear etiology. Monitor. Started fiber. Now resolved. Stopped fiber.    Blood on outside of stool x 2 - looks like hemorrhoids. 4 days of anusol. Continue to monitor. May need outpatient colonoscopy.    Bladder - continent.    DVT prophylaxis - not indicated as patient not a new paraplegic.      Total time:  >35  minutes.  I spent greater than 50% of the time for patient care and coordination on this date, including unit/floor time, and face-to-face time with the patient as per assessment and plan above.    Margo Bruce M.D.

## 2017-08-10 NOTE — CARE PLAN
Problem: Safety  Goal: Will remain free from falls  Outcome: PROGRESSING AS EXPECTED  Reoriented pt with the use of call light and waiting for assistance before getting OOB.    Problem: Bowel/Gastric:  Goal: Normal bowel function is maintained or improved  Outcome: PROGRESSING AS EXPECTED  Administered Senna as prescribed. No s/s of constipation noted. Denied any abdominal pain/discomfort. Last BM was today.

## 2017-08-10 NOTE — PROGRESS NOTES
Endorsed by RN. Assumed care. Pt in bed watching tv. A/o x4. Able to communicate needs. O2 sat of 99% on RA without s/s of respiratory distress noted. Compliant with prescribed HS meds without s/s of adverse reaction noted. Refused Hydrocortisone supp, pt stated he is been refusing it. Denied any pain/discomfort at this time. Safety precautions in place. Call light within reach.

## 2017-08-11 PROCEDURE — 700102 HCHG RX REV CODE 250 W/ 637 OVERRIDE(OP): Performed by: PHYSICAL MEDICINE & REHABILITATION

## 2017-08-11 PROCEDURE — 97535 SELF CARE MNGMENT TRAINING: CPT

## 2017-08-11 PROCEDURE — 97530 THERAPEUTIC ACTIVITIES: CPT

## 2017-08-11 PROCEDURE — 97110 THERAPEUTIC EXERCISES: CPT

## 2017-08-11 PROCEDURE — 99232 SBSQ HOSP IP/OBS MODERATE 35: CPT | Performed by: PHYSICAL MEDICINE & REHABILITATION

## 2017-08-11 PROCEDURE — A9270 NON-COVERED ITEM OR SERVICE: HCPCS | Performed by: PHYSICAL MEDICINE & REHABILITATION

## 2017-08-11 PROCEDURE — 770010 HCHG ROOM/CARE - REHAB SEMI PRIVAT*

## 2017-08-11 RX ADMIN — POLYETHYLENE GLYCOL 3350 1 PACKET: 17 POWDER, FOR SOLUTION ORAL at 11:25

## 2017-08-11 RX ADMIN — BACLOFEN 10 MG: 10 TABLET ORAL at 13:03

## 2017-08-11 RX ADMIN — MIRTAZAPINE 15 MG: 15 TABLET, ORALLY DISINTEGRATING ORAL at 20:00

## 2017-08-11 RX ADMIN — LORAZEPAM 1 MG: 1 TABLET ORAL at 20:00

## 2017-08-11 RX ADMIN — BACLOFEN 10 MG: 10 TABLET ORAL at 20:00

## 2017-08-11 RX ADMIN — Medication 2 TABLET: at 20:00

## 2017-08-11 ASSESSMENT — PAIN SCALES - GENERAL
PAINLEVEL_OUTOF10: 0
PAINLEVEL_OUTOF10: 0

## 2017-08-11 NOTE — PROGRESS NOTES
"Rehab Progress Note     Chief complaint: none, follow up history of T9 spinal cord injury, debility/failure to thrive  Date of Service: 8/11/2017    Interval Events (Subjective)  Patient seen and examined. No acute events overnight. Spasms currently under control. Hasn't had any more blood on his stools. Still working on transfers with therapy.    Objective:  VITAL SIGNS: /71 mmHg  Pulse 68  Temp(Src) 36.4 °C (97.5 °F)  Resp 16  Ht 1.93 m (6' 4\")  Wt 83 kg (182 lb 15.7 oz)  BMI 22.28 kg/m2  SpO2 96%  Gen: alert, no apparent distress  CV: mild tachycardia, regular rhythm, no murmurs, no peripheral edema  Resp: clear to ascultation bilaterally, normal respiratory effort  GI: soft, non-tender abdomen, bowel sounds present  Neuro: notable for flaccid right leg with muscular atrophy, increased tone at the knee, MMT- left hip, knee, and ankle 4 out of 5, patchy sensory changes in the bilateral legs    Recent Results (from the past 72 hour(s))   HEMOGLOBIN A1C    Collection Time: 08/10/17  5:00 AM   Result Value Ref Range    Glycohemoglobin 5.1 0.0 - 5.6 %    Est Avg Glucose 100 mg/dL       Current Facility-Administered Medications   Medication Frequency   • calcium polycarbophil (FIBERCON) tablet 625 mg TID PRN   • baclofen (LIORESAL) tablet 10 mg TID PRN   • senna-docusate (PERICOLACE or SENOKOT S) 8.6-50 MG per tablet 2 Tab Q EVENING    And   • polyethylene glycol/lytes (MIRALAX) PACKET 1 Packet DAILY    And   • magnesium hydroxide (MILK OF MAGNESIA) suspension 30 mL QDAY PRN   • lactulose 20 GM/30ML solution 30 mL QDAY PRN   • bisacodyl (DULCOLAX) suppository 10 mg QDAY PRN   • oxycodone immediate-release (ROXICODONE) tablet 5 mg Q4HRS PRN   • baclofen (LIORESAL) tablet 10 mg QHS   • mirtazapine (REMERON) orally disintegrating tab 15 mg QHS   • guaiFENesin (ROBITUSSIN) 100 MG/5ML solution 200 mg Q4HRS PRN   • Respiratory Care per Protocol Continuous RT   • Pharmacy Consult Request ...Pain Management Review " 1 Each PRN   • tramadol (ULTRAM) 50 MG tablet 50 mg Q4HRS PRN   • acetaminophen (TYLENOL) tablet 650 mg Q4HRS PRN   • artificial tears 1.4 % ophthalmic solution 1 Drop PRN   • benzocaine-menthol (CEPACOL) lozenge 1 Lozenge Q2HRS PRN   • mag hydrox-al hydrox-simeth (MAALOX PLUS ES or MYLANTA DS) suspension 20 mL Q2HRS PRN   • trazodone (DESYREL) tablet 50 mg QHS PRN   • sodium chloride (OCEAN) 0.65 % nasal spray 2 Spray PRN   • ondansetron (ZOFRAN ODT) dispertab 4 mg Q4HRS PRN   • acetaminophen (TYLENOL) tablet 650 mg Q4HRS PRN   • lorazepam (ATIVAN) tablet 1 mg QHS       Orders Placed This Encounter   Procedures   • Diet Order     Standing Status: Standing      Number of Occurrences: 1      Standing Expiration Date:      Order Specific Question:  Diet:     Answer:  Regular [1]       Assessment:  Active Hospital Problems    Diagnosis   • Debility   • Wrist pain   • Back pain   • Paraplegia (CMS-HCC)   • Chronic pain   • Depression   • Poor appetite   • T9 spinal cord injury (CMS-HCC)   • Failure to thrive (0-17)   • Leukopenia     I led and attended the weekly conference, and agree with the IDT conference documentation and plan of care as noted below.    Date of conference: 8/10/2017    Admission FIM 61 --> 79    CM/social support: DC destination/dispostion:  Patient lives in a single level apartment.    Referrals: Possible PAS program    DC Needs: Patient has a tub bench, w/c, cushion and cane.  He may need a new w/c and new AFO's.  We have referred patient for evaluation with Joan and Nan.  I have discussed home health follow up and he is okay with this.    Barriers to discharge: Decreasing function.    Strengths: very motivated.    Anticipated DC date: 8/22/2017, as long as home modifications have been made and  has arrived for practice    Home health: PT/OT/RN    Equip: 2 new AFOs, new wheelchair, transfer pole in house, hospital bed?    Follow up: PCP, SCI clinic    Medical Decision Making and  Plan:    Labs reviewed. Medications reviewed.    T9 incomplete spinal cord injury -with no movement of right leg, 4/5 left leg, increased tone at knees, patchy sensation in both legs. Continue therapies. Being evaluated for new bilateral AFOs and new/modified wheelchair. Also in the process of buying a van that is WC accessible.    Debility/Failure to thrive - multifactorial. Continue full rehab program. Much improved.     Pancytopenia - due to poor nutritional intake. Dietician to see patient.     Alcohol overuse - likely using alcohol for his anxiety. No withdrawal issues since admission.    Spasticity - Scheduled baclofen at night. Start PRN TID baclofen during the day for days when he has increasing spasms. Currently under control.    Bilateral wrist pain - continue PRN splint on left, for severe OA/ligament tear. Xray right with severe OA, soft tissue swelling, and evidence of old surgeries. S/p 4 days of motrin. Ice as needed. Improved.    Depression/anxiety - continue Remeron. Mood improved.     Hypoalbuminemia/Poor appetite - likely due to above. Continue Remeron.    Insomnia - continue ativan at night which patient states helps his sleep. Trazodone as needed.    Hyponatremia/hypokalemia - resolved. Monitor weekly.    Bowel incontinence - twice since admission, unclear etiology. Monitor. Started fiber. Now resolved. Stopped fiber.    Blood on outside of stool x 2 - looks like hemorrhoids. 4 s/p days of anusol. Continue to monitor. May need outpatient colonoscopy.    Bladder - continent.    DVT prophylaxis - not indicated as patient not a new paraplegic.      Total time:  >25 minutes.  I spent greater than 50% of the time for patient care and coordination on this date, including unit/floor time, and face-to-face time with the patient as per assessment and plan above.    Margo Bruce M.D.

## 2017-08-11 NOTE — CARE PLAN
Problem: Safety  Goal: Will remain free from injury  Outcome: PROGRESSING AS EXPECTED  Patient demonstrates good safety technique this shift.  Asks for assistance when needed and does not attempt self transfer.  Able to verbalize needs.  Will continue to monitor.    Problem: Bowel/Gastric:  Goal: Normal bowel function is maintained or improved  Outcome: PROGRESSING AS EXPECTED  Pt refused miralax this morning. Pt did have bowel movement out of facility on home visit per pt report.

## 2017-08-11 NOTE — DISCHARGE PLANNING
Case Management;  Met with patient and reviewed team conference.  He is interested in a lighter wt slide board and may need a hospital bed if his cannot be revised.  I will follow for this.

## 2017-08-11 NOTE — CARE PLAN
Problem: Safety  Goal: Will remain free from injury  Outcome: PROGRESSING AS EXPECTED  Pt uses call light consistently for staff assistance. Pt has good safety awareness, no impulsivity observed.    Problem: Infection  Goal: Will remain free from infection  Outcome: PROGRESSING AS EXPECTED  No s/s of infection present    Problem: Pain Management  Goal: Pain level will decrease to patient’s comfort goal  Outcome: PROGRESSING AS EXPECTED  No reports of pain at this time. Will continue to monitor through shift with hourly rounds.

## 2017-08-11 NOTE — PROGRESS NOTES
DATE OF SERVICE:  08/01/2017    The patient was seen for a followup visit.  The patient reported he is making   very good gains.  He said he is gaining in strength and flexibility.  His   endurance is also improving.  The patient reported no problems with his mood.    He also denied significant problems with his appetite.    This session was devoted to talking to the patient about his progress and some   of his goals.  The patient said he is pleased overall.  He said he is more   hopeful than he ever has been about being able to return to his previous level   of functioning.       ____________________________________     PAULA MARINELLI, PHD    ADEN / ABBI    DD:  08/11/2017 11:07:18  DT:  08/11/2017 11:24:27    D#:  [id number]  Job#:  [id number]

## 2017-08-12 PROCEDURE — A9270 NON-COVERED ITEM OR SERVICE: HCPCS | Performed by: PHYSICAL MEDICINE & REHABILITATION

## 2017-08-12 PROCEDURE — 700102 HCHG RX REV CODE 250 W/ 637 OVERRIDE(OP): Performed by: PHYSICAL MEDICINE & REHABILITATION

## 2017-08-12 PROCEDURE — 770010 HCHG ROOM/CARE - REHAB SEMI PRIVAT*

## 2017-08-12 RX ADMIN — BACLOFEN 10 MG: 10 TABLET ORAL at 20:00

## 2017-08-12 RX ADMIN — POLYETHYLENE GLYCOL 3350 1 PACKET: 17 POWDER, FOR SOLUTION ORAL at 11:40

## 2017-08-12 RX ADMIN — BACLOFEN 10 MG: 10 TABLET ORAL at 13:52

## 2017-08-12 RX ADMIN — LORAZEPAM 1 MG: 1 TABLET ORAL at 20:00

## 2017-08-12 RX ADMIN — Medication 2 TABLET: at 20:00

## 2017-08-12 RX ADMIN — MIRTAZAPINE 15 MG: 15 TABLET, ORALLY DISINTEGRATING ORAL at 20:00

## 2017-08-12 RX ADMIN — TRAZODONE HYDROCHLORIDE 50 MG: 50 TABLET ORAL at 20:00

## 2017-08-12 ASSESSMENT — PAIN SCALES - GENERAL
PAINLEVEL_OUTOF10: 0
PAINLEVEL_OUTOF10: 0

## 2017-08-12 NOTE — CARE PLAN
Problem: Mobility  Goal: Risk for activity intolerance will decrease  Outcome: PROGRESSING AS EXPECTED  Pt OOB this morning and up in w/c this afternoon still. Pt demonstrates knowledge and ability to off load weight independently while up in chair and does so w/o cuing.     Problem: Pain Management  Goal: Pain level will decrease to patient’s comfort goal  Outcome: PROGRESSING AS EXPECTED  Denies pain or need for analgesics, no analgesics administered. Prn baclofen 10 mg administered once per MAR for muscle spasms.

## 2017-08-12 NOTE — PROGRESS NOTES
DATE OF SERVICE:  08/09/2017    The patient was seen for a followup visit.  The patient reported that his   therapies are going well.  He said he has made solid gains.  He said he is   much more optimistic than he was when he first arrived at the hospital that he   will be able to return to his previous level of functioning.  The patient   seemed less anxious and distressed overall.    This session was devoted to talking in general terms about his progress.  The   patient seems confident and upbeat.  The patient seems to be in solid footing.    The patient does not need to be followed anymore while in the hospital.       ____________________________________     PAULA MARINELLI, PHD    ADEN / ABBI    DD:  08/12/2017 14:28:19  DT:  08/12/2017 16:42:09    D#:  [id number]  Job#:  [id number]

## 2017-08-12 NOTE — PROGRESS NOTES
DATE OF SERVICE:  08/04/2017    The patient was seen for followup visit.  Patient continues to report that his   progress is good.  He said he has made very good gains.  He reported his   confidence for being able to regain his strength sufficient enough to allow   him to return to his previous level of functioning is much better.    This session was devoted mostly to review the patient's progress and his   expectations/goals for gains over the next 2 weeks.       ____________________________________     PAULA MARINELLI, PHD    ADEN / ABBI    DD:  08/11/2017 11:44:49  DT:  08/11/2017 17:37:05    D#:  [id number]  Job#:  [id number]

## 2017-08-12 NOTE — CARE PLAN
Problem: Bowel/Gastric:  Goal: Normal bowel function is maintained or improved  Intervention: Educate patient and significant other/support system about diet, fluid intake, medications and activity to promote bowel function  Patient uses bathroom during bowel elimination. Last BM was 8/10/17. Pt on scheduled laxative mediation. Will continue to monitor

## 2017-08-12 NOTE — CARE PLAN
Problem: Safety  Goal: Will remain free from injury  Intervention: Provide assistance with mobility  Patient uses call light consistently and appropriately this shift.  Waits for assistance when needed and does not attempt self transfer.  Able to verbalize needs.  Will continue to monitor.

## 2017-08-12 NOTE — CARE PLAN
Problem: Safety  Goal: Will remain free from injury  Outcome: PROGRESSING AS EXPECTED  Pt. Uses call light within reach and waits for assistance, stayed in bed all night. No impulsiveness noted. Hourly rounding continue for safety.    Problem: Pain Management  Goal: Pain level will decrease to patient’s comfort goal  Outcome: PROGRESSING AS EXPECTED  Pt. Resting comfortably at this time, denies pain when assessed earlier, repositioned in bed for comfort. Continue monitor condition, no complaints made so far.

## 2017-08-13 PROCEDURE — 700102 HCHG RX REV CODE 250 W/ 637 OVERRIDE(OP): Performed by: PHYSICAL MEDICINE & REHABILITATION

## 2017-08-13 PROCEDURE — A9270 NON-COVERED ITEM OR SERVICE: HCPCS | Performed by: PHYSICAL MEDICINE & REHABILITATION

## 2017-08-13 PROCEDURE — 770010 HCHG ROOM/CARE - REHAB SEMI PRIVAT*

## 2017-08-13 RX ADMIN — BACLOFEN 10 MG: 10 TABLET ORAL at 15:42

## 2017-08-13 RX ADMIN — LORAZEPAM 1 MG: 1 TABLET ORAL at 20:00

## 2017-08-13 RX ADMIN — BACLOFEN 10 MG: 10 TABLET ORAL at 20:00

## 2017-08-13 RX ADMIN — MIRTAZAPINE 15 MG: 15 TABLET, ORALLY DISINTEGRATING ORAL at 20:00

## 2017-08-13 RX ADMIN — POLYETHYLENE GLYCOL 3350 1 PACKET: 17 POWDER, FOR SOLUTION ORAL at 08:46

## 2017-08-13 RX ADMIN — TRAZODONE HYDROCHLORIDE 50 MG: 50 TABLET ORAL at 20:00

## 2017-08-13 RX ADMIN — Medication 2 TABLET: at 20:00

## 2017-08-13 ASSESSMENT — PAIN SCALES - GENERAL
PAINLEVEL_OUTOF10: 0
PAINLEVEL_OUTOF10: 0

## 2017-08-13 NOTE — CARE PLAN
Problem: Safety  Goal: Will remain free from injury  Outcome: PROGRESSING AS EXPECTED  Pt. Uses call light within reach and waits for assistance, good safety awareness noted but continue hourly rounding for safety. No impulsiveness noted.    Problem: Pain Management  Goal: Pain level will decrease to patient’s comfort goal  Outcome: PROGRESSING AS EXPECTED  Pt. Resting comfortably and sleeping well, no complaints made. Denies pain when assessed earlier. Continue monitor condition, repositioned in bed for comfort.

## 2017-08-13 NOTE — PROGRESS NOTES
Patient AOx4 has good safety awareness and uses the call light when assistance is needed. Patient denies complaints of pain and has been up participating in therapies, will continue to monitor and assess throughout the day.

## 2017-08-13 NOTE — CARE PLAN
Problem: Safety  Goal: Will remain free from falls  Intervention: Assess risk factors for falls  Patient is AOx4 has good safety awareness and uses the call light when assistance is needed. Patient has call light close by, shoes on when out of bed, bed in low position.

## 2017-08-14 LAB
ANION GAP SERPL CALC-SCNC: 5 MMOL/L (ref 0–11.9)
BUN SERPL-MCNC: 11 MG/DL (ref 8–22)
CALCIUM SERPL-MCNC: 8.4 MG/DL (ref 8.5–10.5)
CHLORIDE SERPL-SCNC: 109 MMOL/L (ref 96–112)
CO2 SERPL-SCNC: 24 MMOL/L (ref 20–33)
CREAT SERPL-MCNC: 0.63 MG/DL (ref 0.5–1.4)
GFR SERPL CREATININE-BSD FRML MDRD: >60 ML/MIN/1.73 M 2
GLUCOSE SERPL-MCNC: 79 MG/DL (ref 65–99)
POTASSIUM SERPL-SCNC: 3.7 MMOL/L (ref 3.6–5.5)
SODIUM SERPL-SCNC: 138 MMOL/L (ref 135–145)

## 2017-08-14 PROCEDURE — 700102 HCHG RX REV CODE 250 W/ 637 OVERRIDE(OP): Performed by: PHYSICAL MEDICINE & REHABILITATION

## 2017-08-14 PROCEDURE — 80048 BASIC METABOLIC PNL TOTAL CA: CPT

## 2017-08-14 PROCEDURE — 97110 THERAPEUTIC EXERCISES: CPT

## 2017-08-14 PROCEDURE — A9270 NON-COVERED ITEM OR SERVICE: HCPCS | Performed by: PHYSICAL MEDICINE & REHABILITATION

## 2017-08-14 PROCEDURE — 97530 THERAPEUTIC ACTIVITIES: CPT

## 2017-08-14 PROCEDURE — 770010 HCHG ROOM/CARE - REHAB SEMI PRIVAT*

## 2017-08-14 PROCEDURE — 99232 SBSQ HOSP IP/OBS MODERATE 35: CPT | Performed by: PHYSICAL MEDICINE & REHABILITATION

## 2017-08-14 PROCEDURE — 97535 SELF CARE MNGMENT TRAINING: CPT

## 2017-08-14 PROCEDURE — 36415 COLL VENOUS BLD VENIPUNCTURE: CPT

## 2017-08-14 RX ORDER — TIZANIDINE 4 MG/1
2 TABLET ORAL ONCE
Status: COMPLETED | OUTPATIENT
Start: 2017-08-14 | End: 2017-08-14

## 2017-08-14 RX ADMIN — TRAZODONE HYDROCHLORIDE 50 MG: 50 TABLET ORAL at 20:14

## 2017-08-14 RX ADMIN — MIRTAZAPINE 15 MG: 15 TABLET, ORALLY DISINTEGRATING ORAL at 20:12

## 2017-08-14 RX ADMIN — BACLOFEN 10 MG: 10 TABLET ORAL at 20:12

## 2017-08-14 RX ADMIN — POLYETHYLENE GLYCOL 3350 1 PACKET: 17 POWDER, FOR SOLUTION ORAL at 08:17

## 2017-08-14 RX ADMIN — TIZANIDINE 2 MG: 4 TABLET ORAL at 13:11

## 2017-08-14 RX ADMIN — LORAZEPAM 1 MG: 1 TABLET ORAL at 20:11

## 2017-08-14 ASSESSMENT — PAIN SCALES - GENERAL
PAINLEVEL_OUTOF10: 3
PAINLEVEL_OUTOF10: 0
PAINLEVEL_OUTOF10: 0

## 2017-08-14 NOTE — CARE PLAN
Problem: Safety  Goal: Will remain free from injury  Outcome: PROGRESSING AS EXPECTED  Pt. With good safety awareness noted, stayed in bed and uses call light within reach and waits for assistance, hourly rounding done.    Problem: Pain Management  Goal: Pain level will decrease to patient’s comfort goal  Outcome: PROGRESSING AS EXPECTED  Pt. Resting comfortably at this time, denies pain but request sleeping aid. Repositioned self in bed for comfort. Continue monitor condition, no complaints made.

## 2017-08-14 NOTE — CARE PLAN
Problem: Bowel/Gastric:  Goal: Normal bowel function is maintained or improved  Intervention: Educate patient and significant other/support system about diet, fluid intake, medications and activity to promote bowel function  LBM 8/13/17. Bowel sounds present in all four quadrants. Pt. on scheduled Miralax. Will continue to monitor.      Problem: Pain Management  Goal: Pain level will decrease to patient’s comfort goal  Intervention: Follow pain managment plan developed in collaboration with patient and Interdisciplinary Team  Pt. with complaints of headache. One time order zanaflex 2mg given as ordered. Will continue to monitor.

## 2017-08-14 NOTE — PROGRESS NOTES
"Rehab Progress Note     Chief complaint: none, follow up history of T9 spinal cord injury, debility/failure to thrive  Date of Service: 8/14/2017    Interval Events (Subjective)  Patient seen and examined. No acute events overnight. Spasms currently under control. Hasn't had any more blood on his stools. Patient discussed having an aura consistent with a migraine      Objective:  VITAL SIGNS: BP 92/55 mmHg  Pulse 72  Temp(Src) 36.4 °C (97.6 °F)  Resp 17  Ht 1.93 m (6' 4\")  Wt 84 kg (185 lb 3 oz)  BMI 22.55 kg/m2  SpO2 96%  Gen: alert, no apparent distress  Neck: Patient was noted to have some significant spasm appreciated in his neck region could be trap of the splenius capitis this time with repetitive to the touch and reproduce the patient's headache-like pain  CV: mild tachycardia, regular rhythm, no murmurs, no peripheral edema  Resp: clear to ascultation bilaterally, normal respiratory effort  GI: soft, non-tender abdomen, bowel sounds present  Neuro: notable for flaccid right leg with muscular atrophy, increased tone at the knee, MMT- left hip, knee, and ankle 4 out of 5, patchy sensory changes in the bilateral legs    Recent Results (from the past 72 hour(s))   BASIC METABOLIC PANEL    Collection Time: 08/14/17  5:43 AM   Result Value Ref Range    Sodium 138 135 - 145 mmol/L    Potassium 3.7 3.6 - 5.5 mmol/L    Chloride 109 96 - 112 mmol/L    Co2 24 20 - 33 mmol/L    Glucose 79 65 - 99 mg/dL    Bun 11 8 - 22 mg/dL    Creatinine 0.63 0.50 - 1.40 mg/dL    Calcium 8.4 (L) 8.5 - 10.5 mg/dL    Anion Gap 5.0 0.0 - 11.9   ESTIMATED GFR    Collection Time: 08/14/17  5:43 AM   Result Value Ref Range    GFR If African American >60 >60 mL/min/1.73 m 2    GFR If Non African American >60 >60 mL/min/1.73 m 2       Current Facility-Administered Medications   Medication Frequency   • calcium polycarbophil (FIBERCON) tablet 625 mg TID PRN   • baclofen (LIORESAL) tablet 10 mg TID PRN   • senna-docusate (PERICOLACE or " SENOKOT S) 8.6-50 MG per tablet 2 Tab Q EVENING    And   • polyethylene glycol/lytes (MIRALAX) PACKET 1 Packet DAILY    And   • magnesium hydroxide (MILK OF MAGNESIA) suspension 30 mL QDAY PRN   • lactulose 20 GM/30ML solution 30 mL QDAY PRN   • bisacodyl (DULCOLAX) suppository 10 mg QDAY PRN   • oxycodone immediate-release (ROXICODONE) tablet 5 mg Q4HRS PRN   • baclofen (LIORESAL) tablet 10 mg QHS   • mirtazapine (REMERON) orally disintegrating tab 15 mg QHS   • guaiFENesin (ROBITUSSIN) 100 MG/5ML solution 200 mg Q4HRS PRN   • Respiratory Care per Protocol Continuous RT   • Pharmacy Consult Request ...Pain Management Review 1 Each PRN   • tramadol (ULTRAM) 50 MG tablet 50 mg Q4HRS PRN   • acetaminophen (TYLENOL) tablet 650 mg Q4HRS PRN   • artificial tears 1.4 % ophthalmic solution 1 Drop PRN   • benzocaine-menthol (CEPACOL) lozenge 1 Lozenge Q2HRS PRN   • mag hydrox-al hydrox-simeth (MAALOX PLUS ES or MYLANTA DS) suspension 20 mL Q2HRS PRN   • trazodone (DESYREL) tablet 50 mg QHS PRN   • sodium chloride (OCEAN) 0.65 % nasal spray 2 Spray PRN   • ondansetron (ZOFRAN ODT) dispertab 4 mg Q4HRS PRN   • acetaminophen (TYLENOL) tablet 650 mg Q4HRS PRN   • lorazepam (ATIVAN) tablet 1 mg QHS       Orders Placed This Encounter   Procedures   • Diet Order     Standing Status: Standing      Number of Occurrences: 1      Standing Expiration Date:      Order Specific Question:  Diet:     Answer:  Regular [1]       Assessment:  Active Hospital Problems    Diagnosis   • Debility   • Wrist pain   • Back pain   • Paraplegia (CMS-HCC)   • Chronic pain   • Depression   • Poor appetite   • T9 spinal cord injury (CMS-HCC)   • Failure to thrive (0-17)   • Leukopenia     Dr. Bruce led and attended the weekly conference, and agree with the IDT conference documentation and plan of care as noted below.    Date of conference: 8/10/2017    Admission FIM 61 --> 79    CM/social support: DC destination/dispostion:  Patient lives in a single  level apartment.    Referrals: Possible PAS program    DC Needs: Patient has a tub bench, w/c, cushion and cane.  He may need a new w/c and new AFO's.  We have referred patient for evaluation with Joan and Nan.  I have discussed home health follow up and he is okay with this.    Barriers to discharge: Decreasing function.    Strengths: very motivated.    Anticipated DC date: 8/22/2017, as long as home modifications have been made and  has arrived for practice    Home health: PT/OT/RN    Equip: 2 new AFOs, new wheelchair, transfer pole in house, hospital bed?    Follow up: PCP, SCI clinic    Medical Decision Making and Plan:    Labs reviewed. Medications reviewed.    T9 incomplete spinal cord injury -with no movement of right leg, 4/5 left leg, increased tone at knees, patchy sensation in both legs. Continue therapies. Being evaluated for new bilateral AFOs and new/modified wheelchair. Also in the process of buying a van that is  accessible.    Debility/Failure to thrive - multifactorial. Continue full rehab program. Much improved.     Pancytopenia - due to poor nutritional intake. Dietician to see patient.     Alcohol overuse - likely using alcohol for his anxiety. No withdrawal issues since admission.    Spasticity - Scheduled baclofen at night. Start PRN TID baclofen during the day for days when he has increasing spasms. Currently under control. Patient is complaining of a headache and clearly had evidence of spasms in the posterior neck region. A one-time dose of tizanidine 2 mg to see if it was beneficial for the patient's headache-like pain that I feel may be a result of neck spasms       Bilateral wrist pain - continue PRN splint on left, for severe OA/ligament tear. Xray right with severe OA, soft tissue swelling, and evidence of old surgeries. S/p 4 days of motrin. Ice as needed. Improved.    Depression/anxiety - continue Remeron. Mood improved.     Hypoalbuminemia/Poor appetite - likely due to  above. Continue Remeron.    Insomnia - continue ativan at night which patient states helps his sleep. Trazodone as needed.    Hyponatremia/hypokalemia - resolved. Monitor weekly.    Bowel incontinence - twice since admission, unclear etiology. Monitor. Started fiber. Now resolved. Stopped fiber.    Blood on outside of stool x 2 - looks like hemorrhoids. 4 s/p days of anusol. Continue to monitor. May need outpatient colonoscopy.    Bladder - continent.    DVT prophylaxis - not indicated as patient not a new paraplegic.    Team conference this Thursday, August 17, 2017  Tentative discharge is August 22, 2017        Total time:  >25 minutes.  I spent greater than 50% of the time for patient care and coordination on this date, including unit/floor time, and face-to-face time with the patient as per assessment and plan above.    Frandy Bautista M.D.

## 2017-08-15 PROCEDURE — 700102 HCHG RX REV CODE 250 W/ 637 OVERRIDE(OP): Performed by: PHYSICAL MEDICINE & REHABILITATION

## 2017-08-15 PROCEDURE — 99232 SBSQ HOSP IP/OBS MODERATE 35: CPT | Performed by: PHYSICAL MEDICINE & REHABILITATION

## 2017-08-15 PROCEDURE — 97530 THERAPEUTIC ACTIVITIES: CPT

## 2017-08-15 PROCEDURE — 97110 THERAPEUTIC EXERCISES: CPT

## 2017-08-15 PROCEDURE — 770010 HCHG ROOM/CARE - REHAB SEMI PRIVAT*

## 2017-08-15 PROCEDURE — A9270 NON-COVERED ITEM OR SERVICE: HCPCS | Performed by: PHYSICAL MEDICINE & REHABILITATION

## 2017-08-15 RX ADMIN — BACLOFEN 10 MG: 10 TABLET ORAL at 21:00

## 2017-08-15 RX ADMIN — Medication 2 TABLET: at 21:00

## 2017-08-15 RX ADMIN — LORAZEPAM 1 MG: 1 TABLET ORAL at 21:00

## 2017-08-15 RX ADMIN — POLYETHYLENE GLYCOL 3350 1 PACKET: 17 POWDER, FOR SOLUTION ORAL at 08:13

## 2017-08-15 RX ADMIN — BACLOFEN 10 MG: 10 TABLET ORAL at 11:58

## 2017-08-15 RX ADMIN — MIRTAZAPINE 15 MG: 15 TABLET, ORALLY DISINTEGRATING ORAL at 21:00

## 2017-08-15 ASSESSMENT — PAIN SCALES - GENERAL
PAINLEVEL_OUTOF10: 0
PAINLEVEL_OUTOF10: 0

## 2017-08-15 NOTE — CARE PLAN
Problem: Safety  Goal: Will remain free from falls  Call light within reach, needs met.  Bed in low and locked position.  Patient educated on fall risk and verbalized agreement, patient does call appropriately for toileting, nutrition, and other needs.  Patient A&O x 4 and not impulsive.    Alarms on the bed and wheelchair.    Problem: Pain Management  Goal: Pain level will decrease to patient’s comfort goal  Patient reporting occasional spasms in L leg. Pain of 2/10. PRN baclofen given, patient reports this helps with spasms.

## 2017-08-15 NOTE — CARE PLAN
Problem: Safety  Goal: Will remain free from falls  Intervention: Implement fall precautions  Use of call light encouraged to make needs known.Side rails up, bed in low position, non skid socks/shoes on when out of bed.Call light and things within reach.Will continue to monitor and assess needs and safety.      Problem: Bowel/Gastric:  Goal: Normal bowel function is maintained or improved  Intervention: Educate patient and significant other/support system about signs and symptoms of constipation and interventions to implement  Pt is continent of bowel per report.Refused scheduled bowel medication.LBM 08/14.Will continue to monitor and assess for s/s of constipation.      Problem: Pain Management  Goal: Pain level will decrease to patient’s comfort goal  Pt is calm, comfortable and stable.Repositioned with pillows for comfort.Will continue to monitor and assess pain level and medicate as needed.

## 2017-08-16 PROCEDURE — 700102 HCHG RX REV CODE 250 W/ 637 OVERRIDE(OP): Performed by: PHYSICAL MEDICINE & REHABILITATION

## 2017-08-16 PROCEDURE — A9270 NON-COVERED ITEM OR SERVICE: HCPCS | Performed by: PHYSICAL MEDICINE & REHABILITATION

## 2017-08-16 PROCEDURE — 99232 SBSQ HOSP IP/OBS MODERATE 35: CPT | Performed by: PHYSICAL MEDICINE & REHABILITATION

## 2017-08-16 PROCEDURE — 97110 THERAPEUTIC EXERCISES: CPT

## 2017-08-16 PROCEDURE — 770010 HCHG ROOM/CARE - REHAB SEMI PRIVAT*

## 2017-08-16 PROCEDURE — 97530 THERAPEUTIC ACTIVITIES: CPT

## 2017-08-16 RX ADMIN — BACLOFEN 10 MG: 10 TABLET ORAL at 17:16

## 2017-08-16 RX ADMIN — MIRTAZAPINE 15 MG: 15 TABLET, ORALLY DISINTEGRATING ORAL at 20:05

## 2017-08-16 RX ADMIN — LORAZEPAM 1 MG: 1 TABLET ORAL at 20:05

## 2017-08-16 RX ADMIN — POLYETHYLENE GLYCOL 3350 1 PACKET: 17 POWDER, FOR SOLUTION ORAL at 08:14

## 2017-08-16 RX ADMIN — BACLOFEN 10 MG: 10 TABLET ORAL at 20:06

## 2017-08-16 ASSESSMENT — PAIN SCALES - GENERAL
PAINLEVEL_OUTOF10: 4
PAINLEVEL_OUTOF10: 0

## 2017-08-16 NOTE — CARE PLAN
Problem: Mobility Transfers  Goal: STG-Within one week, patient will perform bed mobility  1) Individualized goal: Patient will perform bed mobility SPV consistently with use of bed rails  2) Interventions: PT E Stim Attended, PT Orthotics Training, PT Gait Training, PT Self Care/Home Eval, PT Therapeutic Exercises, PT Neuro Re-Ed/Balance, PT Aquatic Therapy, PT Therapeutic Activity, PT Manual Therapy and PT Evaluation.  Outcome: MET Date Met:  08/16/17  Goal: STG-Within one week, patient will transfer bed to chair  1) Individualized goal: With SBA with AD  2) Interventions: PT E Stim Attended, PT Orthotics Training, PT Gait Training, PT Self Care/Home Eval, PT Therapeutic Exercises, PT TENS Application, PT Neuro Re-Ed/Balance, PT Therapeutic Activity, PT Manual Therapy and PT Evaluation  Outcome: MET Date Met:  08/16/17

## 2017-08-16 NOTE — PROGRESS NOTES
"Rehab Progress Note     Chief complaint: none, follow up history of T9 spinal cord injury, debility/failure to thrive  Date of Service: 8/15/2017    Interval Events (Subjective)  Patient seen and examined. He was seen in occupational therapy No acute events overnight. Spasms currently under control. Hasn't had any more blood on his stools. Patient discussed having an aura consistent with a migraine. He reported that the pain was better after the tizanidine yesterday but he's having limitations at this time      Objective:  VITAL SIGNS: /71 mmHg  Pulse 76  Temp(Src) 36.3 °C (97.4 °F)  Resp 18  Ht 1.93 m (6' 4\")  Wt 84 kg (185 lb 3 oz)  BMI 22.55 kg/m2  SpO2 97%  Gen: alert, no apparent distress  Neck: Patient was noted to have decreased spasm at this time   CV: mild tachycardia, regular rhythm, no murmurs, no peripheral edema  Resp: clear to ascultation bilaterally, normal respiratory effort  GI: soft, non-tender abdomen, bowel sounds present  Neuro: notable for flaccid right leg with muscular atrophy, increased tone at the knee, MMT- left hip, knee, and ankle 4 out of 5, patchy sensory changes in the bilateral legs    Recent Results (from the past 72 hour(s))   BASIC METABOLIC PANEL    Collection Time: 08/14/17  5:43 AM   Result Value Ref Range    Sodium 138 135 - 145 mmol/L    Potassium 3.7 3.6 - 5.5 mmol/L    Chloride 109 96 - 112 mmol/L    Co2 24 20 - 33 mmol/L    Glucose 79 65 - 99 mg/dL    Bun 11 8 - 22 mg/dL    Creatinine 0.63 0.50 - 1.40 mg/dL    Calcium 8.4 (L) 8.5 - 10.5 mg/dL    Anion Gap 5.0 0.0 - 11.9   ESTIMATED GFR    Collection Time: 08/14/17  5:43 AM   Result Value Ref Range    GFR If African American >60 >60 mL/min/1.73 m 2    GFR If Non African American >60 >60 mL/min/1.73 m 2       Current Facility-Administered Medications   Medication Frequency   • calcium polycarbophil (FIBERCON) tablet 625 mg TID PRN   • baclofen (LIORESAL) tablet 10 mg TID PRN   • senna-docusate (PERICOLACE or " SENOKOT S) 8.6-50 MG per tablet 2 Tab Q EVENING    And   • polyethylene glycol/lytes (MIRALAX) PACKET 1 Packet DAILY    And   • magnesium hydroxide (MILK OF MAGNESIA) suspension 30 mL QDAY PRN   • lactulose 20 GM/30ML solution 30 mL QDAY PRN   • bisacodyl (DULCOLAX) suppository 10 mg QDAY PRN   • oxycodone immediate-release (ROXICODONE) tablet 5 mg Q4HRS PRN   • baclofen (LIORESAL) tablet 10 mg QHS   • mirtazapine (REMERON) orally disintegrating tab 15 mg QHS   • guaiFENesin (ROBITUSSIN) 100 MG/5ML solution 200 mg Q4HRS PRN   • Respiratory Care per Protocol Continuous RT   • Pharmacy Consult Request ...Pain Management Review 1 Each PRN   • tramadol (ULTRAM) 50 MG tablet 50 mg Q4HRS PRN   • acetaminophen (TYLENOL) tablet 650 mg Q4HRS PRN   • artificial tears 1.4 % ophthalmic solution 1 Drop PRN   • benzocaine-menthol (CEPACOL) lozenge 1 Lozenge Q2HRS PRN   • mag hydrox-al hydrox-simeth (MAALOX PLUS ES or MYLANTA DS) suspension 20 mL Q2HRS PRN   • trazodone (DESYREL) tablet 50 mg QHS PRN   • sodium chloride (OCEAN) 0.65 % nasal spray 2 Spray PRN   • ondansetron (ZOFRAN ODT) dispertab 4 mg Q4HRS PRN   • acetaminophen (TYLENOL) tablet 650 mg Q4HRS PRN   • lorazepam (ATIVAN) tablet 1 mg QHS       Orders Placed This Encounter   Procedures   • Diet Order     Standing Status: Standing      Number of Occurrences: 1      Standing Expiration Date:      Order Specific Question:  Diet:     Answer:  Regular [1]       Assessment:  Active Hospital Problems    Diagnosis   • Debility   • Wrist pain   • Back pain   • Paraplegia (CMS-HCC)   • Chronic pain   • Depression   • Poor appetite   • T9 spinal cord injury (CMS-HCC)   • Failure to thrive (0-17)   • Leukopenia     Dr. Bruce led and attended the weekly conference, and agree with the IDT conference documentation and plan of care as noted below.    Date of conference: 8/10/2017    Admission FIM 61 --> 79    CM/social support: DC destination/dispostion:  Patient lives in a single  level apartment.    Referrals: Possible PAS program    DC Needs: Patient has a tub bench, w/c, cushion and cane.  He may need a new w/c and new AFO's.  We have referred patient for evaluation with Joan and Nan.  I have discussed home health follow up and he is okay with this.    Barriers to discharge: Decreasing function.    Strengths: very motivated.    Anticipated DC date: 8/22/2017, as long as home modifications have been made and  has arrived for practice    Home health: PT/OT/RN    Equip: 2 new AFOs, new wheelchair, transfer pole in house, hospital bed?    Follow up: PCP, SCI clinic    Medical Decision Making and Plan:    Labs reviewed. Medications reviewed.    T9 incomplete spinal cord injury -with no movement of right leg, 4/5 left leg, increased tone at knees, patchy sensation in both legs. Continue therapies. Being evaluated for new bilateral AFOs and new/modified wheelchair. Also in the process of buying a van that is  accessible.    Debility/Failure to thrive - multifactorial. Continue full rehab program. Much improved.     Pancytopenia - due to poor nutritional intake. Dietician to see patient.     Alcohol overuse - likely using alcohol for his anxiety. No withdrawal issues since admission.    Spasticity - Scheduled baclofen at night. Start PRN TID baclofen during the day for days when he has increasing spasms. Currently under control. Patient is complaining of a headache and clearly had evidence of spasms in the posterior neck region. A one-time dose of tizanidine 2 mg to see if it was beneficial for the patient's headache-like pain that I feel may be a result of neck spasms       Bilateral wrist pain - continue PRN splint on left, for severe OA/ligament tear. Xray right with severe OA, soft tissue swelling, and evidence of old surgeries. S/p 4 days of motrin. Ice as needed. Improved.    Depression/anxiety - continue Remeron. Mood improved.     Hypoalbuminemia/Poor appetite - likely due to  above. Continue Remeron.    Insomnia - continue ativan at night which patient states helps his sleep. Trazodone as needed.    Hyponatremia/hypokalemia - resolved. Monitor weekly.    Bowel incontinence - twice since admission, unclear etiology. Monitor. Started fiber. Now resolved. Stopped fiber.    Blood on outside of stool x 2 - looks like hemorrhoids. 4 s/p days of anusol. Continue to monitor. May need outpatient colonoscopy.    Bladder - continent.    DVT prophylaxis - not indicated as patient not a new paraplegic.    Team conference this Thursday, August 17, 2017  Tentative discharge is August 22, 2017        Total time:  >25 minutes.  I spent greater than 50% of the time for patient care and coordination on this date, including unit/floor time, and face-to-face time with the patient as per assessment and plan above.    Frandy Bautista M.D.

## 2017-08-16 NOTE — PROGRESS NOTES
Endorsed by RN. Assumed care. Pt back in bed, resting. A/o x4. Able to communicate needs. O2 sat of 97% on 2L/min via NC without s/s of respiratory distress noted. Compliant with all prescribed HS meds without s/s of adverse reaction noted. HS snack given. Colby hose off, +2 BLE edema still present, legs elevated on pillow. Denied any pain/discomfort. Safety precautions in place. Call light within reach.

## 2017-08-16 NOTE — REHAB-CM IDT TEAM NOTE
Case Management   DC Planning  DC destination/dispostion:  Patient lives in a single level apartment.    Referrals: Possible PAS program    DC Needs: Patient has a tub bench, w/c, cushion and cane.  We have referred patient for evaluation with Joan and Nan.  Joan is repairing patient's w/c.  Patient would like a lighter wt slide board.  Home eval completed and home modifications are in the works.  I have discussed home health follow up and he is okay with [facility].  He will be buying a modified van.    Barriers to discharge:   Decreasing function.    Strengths: very motivated.    Section completed by:  Ida Hays R.N.

## 2017-08-16 NOTE — CARE PLAN
Problem: Bowel/Gastric:  Goal: Normal bowel function is maintained or improved  Outcome: PROGRESSING AS EXPECTED  Administered stool softener as prescribed, no s/s of constipation noted. Last BM was yesterday. Denied any s/s of constipation and active bowel sounds heard on all four quads with auscultation.     Problem: Knowledge Deficit  Goal: Knowledge of the prescribed therapeutic regimen will improve  Outcome: PROGRESSING AS EXPECTED  Pt on Remeron, educated on indications and side effects. Pt verbalized increased appetite during the day and stated he wants to continue taking the medication when he gets discharged.

## 2017-08-16 NOTE — REHAB-PHARMACY IDT TEAM NOTE
Pharmacy  Pharmacy  Antibiotics/Antifungals/Antivirals:  Medication:      Active Orders     None        Route:         n/a  Stop Date:  n/a  Reason:   Antihypertensives/Cardiac:  Medication:    Orders     None        Patient Vitals for the past 24 hrs:   BP Pulse   08/16/17 0700 123/74 mmHg 77   08/15/17 2000 115/71 mmHg 76       Anticoagulation:  Medication:  n/a  INR:      Other key medications: baclofen, mirtazapine, trazodone.   A review of the medication list reveals no issues at this time.  Section completed by:  Vaishnavi Proctor [facility]

## 2017-08-16 NOTE — PROGRESS NOTES
Assumed care. VSS. A&Ox4. Labs checked. Pain level 4/10, declines pain medication. Up to chair and chair in locked position. Call light and belongings within reach.

## 2017-08-16 NOTE — REHAB-PT IDT TEAM NOTE
Physical Therapy  Mobility  Bed mobility:   Min A (one LE assist) to SPV  Bed /Chair/Wheelchair Transfer Initial:  1 - Total Assistance  Bed /Chair/Wheelchair Transfer Current:  4 - Minimal Assistance   Bed/Chair/Wheelchair Transfer Description:   (transfers: slideboard, SBA-SPV; bed mobility: R LE assist for sit to supine)  Walk Initial:  0 - Not tested, patient refused  Walk Current:  0 - Not tested,medical condition   Walk Description:     Wheelchair Initial:  6 - Modified Independent  Wheelchair Current:  6 - Modified Independent   Wheelchair Description:  Extra time, Adaptive equipment (x500 feet, mod I with BUE)  Stairs Initial:  0 - Not tested,medical condition  Stairs Current: 0 - Not tested,medical condition   Stairs Description:    Patient/Family Training/Education:  Continued education on functional mobility  DME/DC Recommendations:  Lightweight w/c, possible slideboard  Strengths:  Effective communication skills, Good insight into deficits/needs, Independent PLOF, Making steady progress towards goals, Motivated for self care and independence, Pleasant and cooperative and Willingly participates in therapeutic activities  Barriers:   Generalized weakness  # of short term goals set= 2  # of short term goals met=2        Physical Therapy Problems           Problem: PT-Long Term Goals     Dates: Start: 07/26/17       Goal: LTG-By discharge, patient will transfer one surface to another     Dates: Start: 07/26/17      Description: 1) Individualized goal:  Patient will transfer mod I wc <> bed/ mod I bed mobility     2) Interventions:  PT E Stim Attended, PT Orthotics Training, PT Gait Training, PT Self Care/Home Eval, PT Therapeutic Exercises, PT Neuro Re-Ed/Balance, PT Aquatic Therapy, PT Therapeutic Activity, PT Manual Therapy and PT Evaluation.            Goal: LTG-By discharge, patient will propel wheelchair     Dates: Start: 07/26/17      Description: 1) Individualized goal:  Patient will propel wc mod I  indoors and outdoors over various inclines and surfaces >300 ft.    2) Interventions:  PT E Stim Attended, PT Orthotics Training, PT Gait Training, PT Self Care/Home Eval, PT Therapeutic Exercises, PT Neuro Re-Ed/Balance, PT Aquatic Therapy, PT Therapeutic Activity, PT Manual Therapy and PT Evaluation.                    Section completed by:  Hilda Guaman DPT

## 2017-08-17 PROCEDURE — 700102 HCHG RX REV CODE 250 W/ 637 OVERRIDE(OP): Performed by: PHYSICAL MEDICINE & REHABILITATION

## 2017-08-17 PROCEDURE — 97110 THERAPEUTIC EXERCISES: CPT

## 2017-08-17 PROCEDURE — A9270 NON-COVERED ITEM OR SERVICE: HCPCS | Performed by: PHYSICAL MEDICINE & REHABILITATION

## 2017-08-17 PROCEDURE — 770010 HCHG ROOM/CARE - REHAB SEMI PRIVAT*

## 2017-08-17 PROCEDURE — 97530 THERAPEUTIC ACTIVITIES: CPT

## 2017-08-17 PROCEDURE — 97535 SELF CARE MNGMENT TRAINING: CPT

## 2017-08-17 PROCEDURE — 99233 SBSQ HOSP IP/OBS HIGH 50: CPT | Performed by: PHYSICAL MEDICINE & REHABILITATION

## 2017-08-17 RX ORDER — GABAPENTIN 100 MG/1
100 CAPSULE ORAL 3 TIMES DAILY
Status: DISCONTINUED | OUTPATIENT
Start: 2017-08-17 | End: 2017-08-22

## 2017-08-17 RX ORDER — ACETAMINOPHEN 325 MG/1
650 TABLET ORAL EVERY 4 HOURS PRN
Status: DISCONTINUED | OUTPATIENT
Start: 2017-08-17 | End: 2017-08-26 | Stop reason: HOSPADM

## 2017-08-17 RX ADMIN — BACLOFEN 10 MG: 10 TABLET ORAL at 10:30

## 2017-08-17 RX ADMIN — MIRTAZAPINE 15 MG: 15 TABLET, ORALLY DISINTEGRATING ORAL at 21:01

## 2017-08-17 RX ADMIN — GABAPENTIN 100 MG: 100 CAPSULE ORAL at 17:21

## 2017-08-17 RX ADMIN — LORAZEPAM 1 MG: 1 TABLET ORAL at 21:01

## 2017-08-17 RX ADMIN — ACETAMINOPHEN 650 MG: 325 TABLET, FILM COATED ORAL at 15:29

## 2017-08-17 RX ADMIN — BACLOFEN 10 MG: 10 TABLET ORAL at 21:02

## 2017-08-17 ASSESSMENT — PAIN SCALES - GENERAL
PAINLEVEL_OUTOF10: 0
PAINLEVEL_OUTOF10: 3
PAINLEVEL_OUTOF10: 2
PAINLEVEL_OUTOF10: 3

## 2017-08-17 NOTE — PROGRESS NOTES
Assumed care. VSS. A&Ox4. Labs checked. Pain mild in right wrist, but patient declines pain medication to avoid constipation, which he reports is working well. Patient aware of PRN Baclofen. Patient up to wheelchair, chair in locked position. Call light and belongings available.

## 2017-08-17 NOTE — CARE PLAN
Problem: Safety  Goal: Will remain free from falls  Outcome: PROGRESSING AS EXPECTED  Sign up indicating fall risk to staff. Patient wearing shoes and socks. Patient up to chair with call light and personal belongings within reach.  Urinal within reach.  Patient educated on use of call light and patient in agreement. Floors clear of clutter.    Problem: Bowel/Gastric:  Goal: Normal bowel function is maintained or improved  Outcome: PROGRESSING AS EXPECTED  Patient last bowel movement today (8/16/17) and bowel movements regular, about every other day.  Patient compliant with taking bowel medications and continent of bowel.

## 2017-08-17 NOTE — CARE PLAN
Problem: Safety  Goal: Will remain free from falls  Outcome: PROGRESSING AS EXPECTED  Reminded pt to use call light and wait for assistance before transferring self, compliant.    Problem: Bowel/Gastric:  Goal: Normal bowel function is maintained or improved  Outcome: PROGRESSING AS EXPECTED  Pt refused scheduled HS stool softener. Pt had a BM today and stated it was soft and he almost did not make it to the bathroom. Denied abdominal pain/discomfort. Normoactive bowel sounds on all abdominal quads.

## 2017-08-17 NOTE — REHAB-ACTIVITY IDT TEAM NOTE
ACT  Recreation/Community     Leisure Competence Measure  Leisure Awareness: Supervision  Leisure Attitude: Modified Independent  Leisure Skills: Supervision  Cultural / Social Behaviors: Supervision  Interpersonal Skills: Supervision  Community Integration Skills:  (not yet assessed)  Social Contact: Modified Independent         Recreation Therapy Problems           Problem: Recreation Therapy     Dates: Start: 08/08/17       Goal: STG-Within one week, patient will     Dates: Start: 08/08/17      Description: Investigate new or previous leisure pursuits to resume post discharge        Goal: LTG-By discharge, patient will     Dates: Start: 08/08/17      Description: identify 2 new leisure pursuits for use post discharge              Strengths:   Motivated, cooperative  Barriers:   Depression, activity tolerance, bilateral wrist pain/impairment    Section completed by:  Lara Briceno, CTRS

## 2017-08-17 NOTE — WEEKLY
Recreational Therapy Weekly Plan of Care Note   1) Assessment: Patient has attended some Recreational Therapy sessions in the past week and achieved 0 out of 1 short term goal but is making good progress.  2) Plan: Recommend Recreational Therapy 30-60 minutes per day 3 to 4x per week for 2 weeks for the following treatments: Fine and Gross motor leisure functioning, Cognitive leisure functioning, Communication/Social skills, Leisure Education, Community Re-integration, Emotional and Behavioral functioning.   3) Goals: Please refer to care plan for goals.
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
## 2017-08-17 NOTE — CARE PLAN
Problem: Bathing  Goal: STG-Within one week, patient will bathe  1) Individualized Goal: With mod I using AE and DME prn  2) Interventions: OT Orthotics Training, OT E Stim Attended, OT Self Care/ADL, OT Cognitive Skill Dev, OT Community Reintegration, OT Manual Ther Technique, OT Neuro Re-Ed/Balance, OT Sensory Int Techniques, OT Therapeutic Activity, OT Evaluation and OT Therapeutic Exercise  Outcome: NOT MET    Problem: Functional Transfers  Goal: STG-Within one week, patient will transfer to toilet  1) Individualized Goal: With supv using dme as needed.  2) Interventions: OT Self Care/ADL and OT Neuro Re-Ed/Balance  Outcome: MET Date Met:  08/17/17  Goal: STG-Within one week, patient will transfer to tub/shower  1) Individualized Goal:with supv using dme as needed.  2) Interventions: OT Self Care/ADL and OT Neuro Re-Ed/Balance  Outcome: MET Date Met:  08/17/17    Problem: IADL’s  Goal: STG-Within one week, patient will access kitchen area  1) Individualized Goal: With supv at wheelchair level.  2) Interventions: OT Self Care/ADL, OT Community Reintegration and OT Therapeutic Activity  Outcome: MET Date Met:  08/17/17

## 2017-08-17 NOTE — PROGRESS NOTES
Endorsed by RN. Assumed care. Pt in bed watching tv. A/o x4. Able to communicate needs. VSS. O2 sat of 99% on RA without s/s of respiratory distress noted. Compliant with prescribed HS meds without s/s of adverse reaction noted. Refused HS snack. Denied any pain/discomfort. Safety precautions in place. Call light within reach.

## 2017-08-17 NOTE — REHAB-OT IDT TEAM NOTE
Occupational Therapy  Activities of Daily Living  Eating Initial:  5 - Standby Prompting/Supervision or Set-up  Eating Current:  7 - Independent   Eating Description:  Increased time, Supervision for safety, Verbal cueing  Grooming Initial:  5 - Standby Prompting/Supervision or Set-up  Grooming Current:  6 - Modified Independent   Grooming Description:   (wheelchair level)  Bathing Initial:  5 - Standby Prompting/Supervision or Set-up  Bathing Current:  5 - Standby Prompting/Supervision or Set-up   Bathing Description:  Grab bar, Tub bench, Hand held shower  Upper Body Dressing Initial:  5 - Standby Prompting/Supervision or Set-up  Upper Body Dressing Current:  6 - Modified Independent   Upper Body Dressing Description:   (Mod I donning T-shirt)  Lower Body Dressing Initial:  5 - Standby Prompting/Supervsion or Set-up  Lower Body Dressing Current:  4 - Minimal Assistance   Lower Body Dressing Description:  4 - Minimal Assistance for pulling pants up seated in wheelchair  Toileting Initial:  5 - Standby Prompting/Supervision or Set-up  Toileting Current:  2 - Max Assistance (not current)   Toileting Description:   (required assist to push down and pull up  pants  completed wiping with out assist )  Toilet Transfer Initial:  0 - Not tested, patient refused  Toilet Transfer Current:  5 - Standby Prompting/Supervision or Set-up   Toilet Transfer Description:  5 - Standby Prompting/Supervision or Set-up  Tub / Shower Transfer Initial:  1 - Total Assistance  Tub / Shower Transfer Current:  5 - Standby Prompting/Supervision or Set-up   Tub / Shower Transfer Description:  Grab bar (side scoot transfer)  IADL: accessed kitchen, utilized washer/dryer, meal prep ongoing   Family Training/Education:  ongoing  DME/DC Recommendations: shower tub transfer bench, grab bars by toilet/in shower, raised toilet seat, vertical pole for bedside transfers or hospital bed    Strengths:  Able to follow instructions, Alert and oriented,  Effective communication skills, Good balance, Good insight into deficits/needs, Making steady progress towards goals, Motivated for self care and independence, Pleasant and cooperative and Willingly participates in therapeutic activities  Barriers:  Decreased endurance, Generalized weakness, Limited mobility and Poor activity tolerance, chronic wrist pain/swelling, and reluctant to try techniques different from his own during ADLs (LB dressing)      # of short term goals set= 4   # of short term goals met= 3          Occupational Therapy Goals           Problem: Bathing     Dates: Start: 07/26/17       Goal: STG-Within one week, patient will bathe     Dates: Start: 07/26/17      Description: 1) Individualized Goal:  With mod I using AE and DME prn    2) Interventions:  OT Orthotics Training, OT E Stim Attended, OT Self Care/ADL, OT Cognitive Skill Dev, OT Community Reintegration, OT Manual Ther Technique, OT Neuro Re-Ed/Balance, OT Sensory Int Techniques, OT Therapeutic Activity, OT Evaluation and OT Therapeutic Exercise              Problem: Functional Transfers     Dates: Start: 07/26/17       Goal: STG-Within one week, patient will transfer to tub/shower     Dates: Start: 08/17/17      Description: 1) Individualized Goal:  With modified independence using DME as needed.     2) Interventions:  OT Self Care/ADL, OT Neuro Re-Ed/Balance, OT Therapeutic Activity and OT Therapeutic Exercise              Problem: IADL's     Dates: Start: 08/10/17       Goal: STG-Within one week, patient will     Dates: Start: 08/17/17      Description: 1) Individualized Goal:  Prepare a simple meal with supervision using DME as needed.     2) Interventions:  OT Self Care/ADL, OT Neuro Re-Ed/Balance, OT Therapeutic Activity and OT Therapeutic Exercise              Problem: OT Long Term Goals     Dates: Start: 07/27/17       Goal: LTG-By discharge, patient will complete basic self care tasks     Dates: Start: 07/27/17      Description: 1)  Individualized Goal: modified independent at wheelchair level    2) Interventions:  OT Self Care/ADL and OT Neuro Re-Ed/Balance            Goal: LTG-By discharge, patient will perform bathroom transfers     Dates: Start: 07/27/17      Description: 1) Individualized Goal: modified independent wheelchair level    2) Interventions:  OT Self Care/ADL and OT Neuro Re-Ed/Balance            Goal: LTG-By discharge, patient will complete basic home management     Dates: Start: 07/27/17      Description: 1) Individualized Goal:  Modified independent at a wheelchair level.    2) Interventions:  OT Self Care/ADL, OT Community Reintegration, OT Neuro Re-Ed/Balance, OT Therapeutic Activity and OT Therapeutic Exercise                  Section completed by:  GOLD Ortega/MP Garrido/EILEEN

## 2017-08-17 NOTE — REHAB-NURSING IDT TEAM NOTE
Nursing  Nursing  Diet/Nutrition:  Regular and Thin Liquids  Medication Administration:  Whole with Liquid Wash  % consumed at meals in last 24 hours:  Consumed 100-100% of meals per documentation.  Breakfast:  100%   Lunch:  100%  Dinner:   (no meal ticket found)%   Snack schedule:  HS  Supplement:  Boost Plus  Appetite:  Good  Fluid Intake/Output in past 24 hours: In: 1020 [P.O.:1020]  Out: 350   Admit Weight:  Weight: 83.008 kg (183 lb)  Weight Last 7 Days   [84 kg (185 lb 3 oz)] 84 kg (185 lb 3 oz) (08/13 1957)    Pain Issues:    Location:  --  --         Severity:  Denies (Otto GUILLERMO, NIKKIE: patient reporting pain in l. wrist, but declines medication to prevent constipation, experiencing spasms in left leg and taking PRN Baclofen, l. elbow pain and swelling)  Scheduled pain medications:  None     PRN pain medications used in last 24 hours:  None   Non Pharmacologic Interventions:  environmental changes, relaxation, repositioned and rest (Otto GUILLERMO RN: use of ice pack on l. elbow)  Effectiveness of pain management plan:  good=patient states acceptable comfort after interventions    Bowel:    Bowel Assist Initial Score:  2 - Max Assistance  Bowel Assist Current Score:  4 - Minimal Assistance  Bowl Accidents in last 7 days:  1  Last bowel movement: 08/17/17  Stool: Medium, Soft     Usual bowel pattern:  every other day  Scheduled bowel medications:  senna-docusate (PERICOLACE or SENOKOT S)  and polyethylene glycol/lytes (MIRALAX)  (Otto GUILLERMO RN: declined Miralax in am)  PRN bowel medications used in last 24 hours:  None  Nursing Interventions:  Increased time, Scheduled medication, Supervision, Set-up, Positioning on commode/toilet  Effectiveness of bowel program:   fair=sometimes needs prn bowel meds for constipation  Bladder:    Bladder Assist Initial Score:  5 - Standby Prompting/Supervision or Set-up  Bladder Assist Current Score:  4 - Minimal Assistance  Bladder Accidents in last 7 days:  0  Medications affecting  bladder:  None    Time void schedule/voiding pattern:  Time void every 3 hours during the day and every 4 hours at night  Interventions:  Increased time, Supervision, Emptying of device, Urinal  Effectiveness of bladder training:  Good=regular, predictable, emptying of bladder, patient initiates time voiding      Sleep/wake cycle:   Average hours slept:  Sleeps 4-6 hours without waking  Sleep medication usage:  Other Trazodone    Patient/Family Training/Education:  Diet/Nutrition, Fall Prevention, General Self Care, Medication Management, Safe Transfers, Safety and Skin Care  Strengths: Alert and oriented, Willingly participates in therapeutic activities, Able to follow instructions, Pleasant and cooperative, Effective communication skills, Good carryover of learning, Motivated for self care and independence, Good endurance and Manages pain appropriately   Barriers:   Fatigue, Generalized weakness and Limited mobility            Nursing Problems           Problem: Bowel/Gastric:     Goal: Normal bowel function is maintained or improved     Flowsheet: Taken at 07/30/17 1840    Last BM 07/30/17 by Teresa Frye       Goal: Will not experience complications related to bowel motility           Problem: Discharge Barriers/Planning     Goal: Patient's continuum of care needs will be met           Problem: Infection     Goal: Will remain free from infection           Problem: Knowledge Deficit     Goal: Knowledge of disease process/condition, treatment plan, diagnostic tests, and medications will improve         Goal: Knowledge of the prescribed therapeutic regimen will improve           Problem: Mobility     Goal: Risk for activity intolerance will decrease           Problem: Pain Management     Goal: Pain level will decrease to patient's comfort goal           Problem: Safety     Goal: Will remain free from injury         Goal: Will remain free from falls           Problem: Skin Integrity     Goal: Risk for impaired skin  integrity will decrease           Problem: Venous Thromboembolism (VTW)/Deep Vein Thrombosis (DVT) Prevention:     Goal: Patient will participate in Venous Thrombosis (VTE)/Deep Vein Thrombosis (DVT)Prevention Measures                Long Term Goals:   At discharge patient will be able to function safely at home and in the community with support.    Section completed by:  Mary Miller R.N.

## 2017-08-17 NOTE — CARE PLAN
Problem: Recreation Therapy  Goal: STG-Within one week, patient will  Investigate new or previous leisure pursuits to resume post discharge   Outcome: PROGRESSING AS EXPECTED

## 2017-08-18 ENCOUNTER — HOME HEALTH ADMISSION (OUTPATIENT)
Dept: HOME HEALTH SERVICES | Facility: HOME HEALTHCARE | Age: 67
End: 2017-08-18
Payer: MEDICARE

## 2017-08-18 PROCEDURE — 97110 THERAPEUTIC EXERCISES: CPT

## 2017-08-18 PROCEDURE — 97530 THERAPEUTIC ACTIVITIES: CPT

## 2017-08-18 PROCEDURE — A9270 NON-COVERED ITEM OR SERVICE: HCPCS | Performed by: PHYSICAL MEDICINE & REHABILITATION

## 2017-08-18 PROCEDURE — 700102 HCHG RX REV CODE 250 W/ 637 OVERRIDE(OP): Performed by: PHYSICAL MEDICINE & REHABILITATION

## 2017-08-18 PROCEDURE — 770010 HCHG ROOM/CARE - REHAB SEMI PRIVAT*

## 2017-08-18 PROCEDURE — 99232 SBSQ HOSP IP/OBS MODERATE 35: CPT | Performed by: PHYSICAL MEDICINE & REHABILITATION

## 2017-08-18 RX ADMIN — TRAZODONE HYDROCHLORIDE 50 MG: 50 TABLET ORAL at 20:00

## 2017-08-18 RX ADMIN — LORAZEPAM 1 MG: 1 TABLET ORAL at 20:00

## 2017-08-18 RX ADMIN — GABAPENTIN 100 MG: 100 CAPSULE ORAL at 08:52

## 2017-08-18 RX ADMIN — MIRTAZAPINE 15 MG: 15 TABLET, ORALLY DISINTEGRATING ORAL at 20:00

## 2017-08-18 RX ADMIN — BACLOFEN 10 MG: 10 TABLET ORAL at 20:00

## 2017-08-18 ASSESSMENT — PAIN SCALES - GENERAL
PAINLEVEL_OUTOF10: 0
PAINLEVEL_OUTOF10: 0

## 2017-08-18 NOTE — DISCHARGE PLANNING
Home health referral sent to [facility] per choice form.  Awaiting response.  DME referral sent to [facility] per choice form.  Awaiting response.

## 2017-08-18 NOTE — PROGRESS NOTES
Endorsed by RN. Assumed care. Pt in bed watching tv. A/o x4. Able to communicate needs. O2 sat of 99% on RA without s/s of respiratory distress noted. Compliant with prescribed HS meds without s/s of adverse reaction noted. L elbow and R wrist swelling noted. Pt verbalized mild pain and discomfort but refused pain medication. Cold and warm compress offered, pt refused. Safety precautions in place. Call light within reach.

## 2017-08-18 NOTE — CARE PLAN
"Problem: Venous Thromboembolism (VTW)/Deep Vein Thrombosis (DVT) Prevention:  Goal: Patient will participate in Venous Thrombosis (VTE)/Deep Vein Thrombosis (DVT)Prevention Measures  Outcome: PROGRESSING SLOWER THAN EXPECTED  Held pt's HS stool softener for the second night. Pt continues to have loose stools. Denied any abdominal pain/discomfort. Normoactive bowel sounds heard on all four quads.     Problem: Knowledge Deficit  Goal: Knowledge of the prescribed therapeutic regimen will improve  Intervention: Discuss information regarding therpeutic regimen and document in education  Pt refused to take HS Gabapentin. Educated and answered pt's questions regarding new order of Gabapentin. \"I think it's helping with my wrist pain but I will skip the dose for tonight and will take the morning dose.\" as verbalized by pt.           "

## 2017-08-18 NOTE — CARE PLAN
"Problem: Bowel/Gastric:  Goal: Normal bowel function is maintained or improved  Outcome: PROGRESSING AS EXPECTED  Patient declined stool softener this morning due to loose stools. Pt is continent of bowel  with last BM today.    Problem: Knowledge Deficit  Goal: Knowledge of disease process/condition, treatment plan, diagnostic tests, and medications will improve  Outcome: PROGRESSING AS EXPECTED  Patient was prescribed Gabapentin 100 mg TID for left wrist and elbow pain. Patient refused medication initially due to possible side effects of medication and pharmacist (Tanner) was asked to meet with patient to discuss concerns. Patient was educated by pharmacist on medication and willing to try two hours after scheduled time for a \"trial run\". Medication was given at 1721. Pharmacy note sent to Tanner for follow-up in the morning to determine if patient wishes to continue taking medication after trial run.    Problem: Pain Management  Goal: Pain level will decrease to patient’s comfort goal  Outcome: PROGRESSING AS EXPECTED  Patient expressed increased pain in his left wrist and elbow.  Wrist and elbow both swollen and MD notified, MD observed both and prescribed cold and hot packs for areas and started patient on Gabapentin 100 mg TID. Patient also given PRN Tylenol 650 mg to control pain. Pain decreased per patient report.        "

## 2017-08-18 NOTE — PROGRESS NOTES
Pharmacist, Tanner, communicated that it is okay to offer patient next dose of Gabapentin at 2200 instead of at scheduled 2100 due to patient taking 1500 dose at 1700.  Patient aware.

## 2017-08-18 NOTE — DISCHARGE PLANNING
Case Management;  Met with patient.  Measured the slide board he is currently using.  Will explore options for lightweight one.  I have left a message for Anaya Wyatt at [facility] to see if I can refer patient for the PAS program for some home caregivers.  Patient is agreeable with this.  His w/c was delivered today.  Will follow.

## 2017-08-18 NOTE — DISCHARGE PLANNING
Case Management;  Met with patient and reviewed team conference discussion.  We have contacted Joan and patient's chair is near ready.  Inquired about slide board and they will let us know what options they have.  Patient is agreeable to a referral to PAS program and [facility].  I will follow to complete this.

## 2017-08-19 LAB — GLUCOSE BLD-MCNC: 281 MG/DL (ref 65–99)

## 2017-08-19 PROCEDURE — 700102 HCHG RX REV CODE 250 W/ 637 OVERRIDE(OP): Performed by: PHYSICAL MEDICINE & REHABILITATION

## 2017-08-19 PROCEDURE — A9270 NON-COVERED ITEM OR SERVICE: HCPCS | Performed by: PHYSICAL MEDICINE & REHABILITATION

## 2017-08-19 PROCEDURE — 770010 HCHG ROOM/CARE - REHAB SEMI PRIVAT*

## 2017-08-19 PROCEDURE — 82962 GLUCOSE BLOOD TEST: CPT

## 2017-08-19 RX ADMIN — Medication 2 TABLET: at 20:24

## 2017-08-19 RX ADMIN — LORAZEPAM 1 MG: 1 TABLET ORAL at 20:24

## 2017-08-19 RX ADMIN — BACLOFEN 10 MG: 10 TABLET ORAL at 20:24

## 2017-08-19 RX ADMIN — MIRTAZAPINE 15 MG: 15 TABLET, ORALLY DISINTEGRATING ORAL at 20:24

## 2017-08-19 ASSESSMENT — PAIN SCALES - GENERAL
PAINLEVEL_OUTOF10: 0
PAINLEVEL_OUTOF10: 0

## 2017-08-19 NOTE — CARE PLAN
Problem: Safety  Goal: Will remain free from falls  Outcome: PROGRESSING AS EXPECTED  Pt oriented x 4, not impulsive, uses call light approprietly.    Problem: Pain Management  Goal: Pain level will decrease to patient’s comfort goal  Outcome: PROGRESSING AS EXPECTED  Pt denies pain or need for analgesics. Does admit to stiffness to fingers that does not require intervention beyond movement. Pt refuses scheduled gabapentin.

## 2017-08-19 NOTE — CARE PLAN
Problem: Safety  Goal: Will remain free from injury  Outcome: PROGRESSING AS EXPECTED  Pt. Uses call light within reach and waits for assistance, no impulsiveness noted. Pt. With good safety awareness noted but hourly rounding continue for safety.    Problem: Pain Management  Goal: Pain level will decrease to patient’s comfort goal  Outcome: PROGRESSING AS EXPECTED  Pt.denies pain and refused scheduled gabapentin earlier, resting comfortably and sleeping well. No complaints made. Continue monitor condition.

## 2017-08-19 NOTE — CARE PLAN
Problem: Safety  Goal: Will remain free from injury  Outcome: PROGRESSING AS EXPECTED  Pt free from fall or injury this shift. Pt calls and waits for assistance before attempting to transfer. Able to verbalize needs and is aware of safety precautions.     Problem: Pain Management  Goal: Pain level will decrease to patient’s comfort goal  Outcome: PROGRESSING AS EXPECTED  Pt reports no pain this shift, no pharm or non-pharm interventions provided. Pt able to participate in all therapies and complete ADLs as tolerated.

## 2017-08-20 PROCEDURE — 97112 NEUROMUSCULAR REEDUCATION: CPT

## 2017-08-20 PROCEDURE — 770010 HCHG ROOM/CARE - REHAB SEMI PRIVAT*

## 2017-08-20 PROCEDURE — 97110 THERAPEUTIC EXERCISES: CPT

## 2017-08-20 PROCEDURE — 700102 HCHG RX REV CODE 250 W/ 637 OVERRIDE(OP): Performed by: PHYSICAL MEDICINE & REHABILITATION

## 2017-08-20 PROCEDURE — A9270 NON-COVERED ITEM OR SERVICE: HCPCS | Performed by: PHYSICAL MEDICINE & REHABILITATION

## 2017-08-20 PROCEDURE — 97535 SELF CARE MNGMENT TRAINING: CPT

## 2017-08-20 PROCEDURE — 97530 THERAPEUTIC ACTIVITIES: CPT

## 2017-08-20 RX ADMIN — MIRTAZAPINE 15 MG: 15 TABLET, ORALLY DISINTEGRATING ORAL at 20:29

## 2017-08-20 RX ADMIN — POLYETHYLENE GLYCOL 3350 1 PACKET: 17 POWDER, FOR SOLUTION ORAL at 08:47

## 2017-08-20 RX ADMIN — BACLOFEN 10 MG: 10 TABLET ORAL at 20:30

## 2017-08-20 RX ADMIN — BACLOFEN 10 MG: 10 TABLET ORAL at 18:02

## 2017-08-20 RX ADMIN — TRAZODONE HYDROCHLORIDE 50 MG: 50 TABLET ORAL at 20:30

## 2017-08-20 RX ADMIN — LORAZEPAM 1 MG: 1 TABLET ORAL at 20:30

## 2017-08-20 RX ADMIN — Medication 2 TABLET: at 20:29

## 2017-08-20 ASSESSMENT — PAIN SCALES - GENERAL
PAINLEVEL_OUTOF10: 0
PAINLEVEL_OUTOF10: 0

## 2017-08-20 NOTE — CARE PLAN
Problem: Safety  Goal: Will remain free from injury  Outcome: PROGRESSING AS EXPECTED  Pt. Uses call light within reach and waits for assistance, good safety awareness but hourly rounding continue for safety no impulsiveness.    Problem: Pain Management  Goal: Pain level will decrease to patient’s comfort goal  Outcome: PROGRESSING AS EXPECTED  Pt. Resting comfortably and sleeping well, no complaints made, pain is controlled. Repositioned self in bed for comfort. Continue monitor condition.

## 2017-08-21 PROCEDURE — 700102 HCHG RX REV CODE 250 W/ 637 OVERRIDE(OP): Performed by: PHYSICAL MEDICINE & REHABILITATION

## 2017-08-21 PROCEDURE — 99232 SBSQ HOSP IP/OBS MODERATE 35: CPT | Performed by: PHYSICAL MEDICINE & REHABILITATION

## 2017-08-21 PROCEDURE — 770010 HCHG ROOM/CARE - REHAB SEMI PRIVAT*

## 2017-08-21 PROCEDURE — 97537 COMMUNITY/WORK REINTEGRATION: CPT

## 2017-08-21 PROCEDURE — A9270 NON-COVERED ITEM OR SERVICE: HCPCS | Performed by: PHYSICAL MEDICINE & REHABILITATION

## 2017-08-21 PROCEDURE — 97530 THERAPEUTIC ACTIVITIES: CPT

## 2017-08-21 PROCEDURE — 97535 SELF CARE MNGMENT TRAINING: CPT

## 2017-08-21 PROCEDURE — 97110 THERAPEUTIC EXERCISES: CPT

## 2017-08-21 RX ADMIN — MIRTAZAPINE 15 MG: 15 TABLET, ORALLY DISINTEGRATING ORAL at 20:00

## 2017-08-21 RX ADMIN — TRAZODONE HYDROCHLORIDE 50 MG: 50 TABLET ORAL at 20:00

## 2017-08-21 RX ADMIN — LORAZEPAM 1 MG: 1 TABLET ORAL at 20:00

## 2017-08-21 RX ADMIN — Medication 2 TABLET: at 20:00

## 2017-08-21 RX ADMIN — BACLOFEN 10 MG: 10 TABLET ORAL at 16:07

## 2017-08-21 RX ADMIN — BACLOFEN 10 MG: 10 TABLET ORAL at 20:00

## 2017-08-21 RX ADMIN — OXYCODONE HYDROCHLORIDE 5 MG: 5 TABLET ORAL at 23:55

## 2017-08-21 RX ADMIN — OXYCODONE HYDROCHLORIDE 5 MG: 5 TABLET ORAL at 20:00

## 2017-08-21 RX ADMIN — ACETAMINOPHEN 650 MG: 325 TABLET, FILM COATED ORAL at 08:51

## 2017-08-21 ASSESSMENT — PAIN SCALES - GENERAL
PAINLEVEL_OUTOF10: 8
PAINLEVEL_OUTOF10: 0

## 2017-08-21 NOTE — PROGRESS NOTES
"Rehab Progress Note     Chief complaint: none, follow up history of T9 spinal cord injury, debility/failure to thrive  Date of Service: 8/21/2017    Interval Events (Subjective)  Patient seen and examined today in room. Patient reports weekend went well. No acute complaints. Occasional elevated blood sugars.  Reports yesterday had a shooting pain into his left lower extremity associated with lower back pain. Reports he feels a little weaker but that it is improving. Thinks he worked too hard over the weekend which exacerbated his back pain and hence his radicular type symptoms.     Wheelchair has arrived but he has not had time to practice.  Home modifications for this Thursday      Objective:  VITAL SIGNS: /69 mmHg  Pulse 78  Temp(Src) 36.6 °C (97.8 °F)  Resp 18  Ht 1.93 m (6' 4\")  Wt 84 kg (185 lb 3 oz)  BMI 22.55 kg/m2  SpO2 93%  Gen: alert, no apparent distress  Neck: No visible spasms, mild tenderness to palpation   CV: regular rate, regular rhythm, no murmurs, no peripheral edema  Resp: clear to ascultation bilaterally, normal respiratory effort  GI: soft, non-tender abdomen, bowel sounds present  Neuro: Unchanged    Current Facility-Administered Medications   Medication Frequency   • gabapentin (NEURONTIN) capsule 100 mg TID   • acetaminophen (TYLENOL) tablet 650 mg Q4HRS PRN    Or   • acetaminophen (TYLENOL) tablet 650 mg Q4HRS PRN   • calcium polycarbophil (FIBERCON) tablet 625 mg TID PRN   • baclofen (LIORESAL) tablet 10 mg TID PRN   • senna-docusate (PERICOLACE or SENOKOT S) 8.6-50 MG per tablet 2 Tab Q EVENING    And   • polyethylene glycol/lytes (MIRALAX) PACKET 1 Packet DAILY    And   • magnesium hydroxide (MILK OF MAGNESIA) suspension 30 mL QDAY PRN   • lactulose 20 GM/30ML solution 30 mL QDAY PRN   • bisacodyl (DULCOLAX) suppository 10 mg QDAY PRN   • oxycodone immediate-release (ROXICODONE) tablet 5 mg Q4HRS PRN   • baclofen (LIORESAL) tablet 10 mg QHS   • mirtazapine (REMERON) orally " disintegrating tab 15 mg QHS   • guaiFENesin (ROBITUSSIN) 100 MG/5ML solution 200 mg Q4HRS PRN   • Respiratory Care per Protocol Continuous RT   • Pharmacy Consult Request ...Pain Management Review 1 Each PRN   • tramadol (ULTRAM) 50 MG tablet 50 mg Q4HRS PRN   • artificial tears 1.4 % ophthalmic solution 1 Drop PRN   • benzocaine-menthol (CEPACOL) lozenge 1 Lozenge Q2HRS PRN   • mag hydrox-al hydrox-simeth (MAALOX PLUS ES or MYLANTA DS) suspension 20 mL Q2HRS PRN   • trazodone (DESYREL) tablet 50 mg QHS PRN   • sodium chloride (OCEAN) 0.65 % nasal spray 2 Spray PRN   • ondansetron (ZOFRAN ODT) dispertab 4 mg Q4HRS PRN   • lorazepam (ATIVAN) tablet 1 mg QHS       Orders Placed This Encounter   Procedures   • Diet Order     Standing Status: Standing      Number of Occurrences: 1      Standing Expiration Date:      Order Specific Question:  Diet:     Answer:  Regular [1]       Assessment:  Active Hospital Problems    Diagnosis   • Debility   • Wrist pain   • Back pain   • Paraplegia (CMS-HCC)   • Chronic pain   • Depression   • Poor appetite   • T9 spinal cord injury (CMS-HCC)   • Failure to thrive (0-17)   • Leukopenia       Date of conference: 8/10/2017    CM/social support: DC destination/dispostion:  Patient lives in a single level apartment.    Referrals: Possible PAS program    DC Needs: Patient has a tub bench, w/c, cushion and cane.  He may need a new w/c and new AFO's.  We have referred patient for evaluation with Joan and Nan.  I have discussed home health follow up and he is okay with this.    Barriers to discharge: Decreasing function.    Strengths: very motivated.    Anticipated DC date: 8/22/2017, as long as home modifications have been made and  has arrived for practice    Home health: PT/OT/RN    Equip: 2 new AFOs, new wheelchair, transfer pole in house, hospital bed?    Follow up: PCP, SCI clinic    Medical Decision Making and Plan:    Labs reviewed. Medications reviewed.    T9 incomplete  spinal cord injury -with no movement of right leg, 4/5 left leg, increased tone at knees, patchy sensation in both legs. Continue therapies. Being evaluated for new bilateral AFOs and new/modified wheelchair. Also in the process of buying a van that is WC accessible.    Debility/Failure to thrive - multifactorial. Continue full rehab program. Much improved.     Pancytopenia - due to poor nutritional intake. Dietician to see patient.     Alcohol overuse - likely using alcohol for his anxiety. No withdrawal issues since admission.    Spasticity - Currently on scheduled baclofen only at night. Discussed increasing to TID, at this time patient would like to hold off.     Bilateral wrist pain - continue PRN splint on left, for severe OA/ligament tear. Xray right with severe OA, soft tissue swelling, and evidence of old surgeries. S/p 4 days of motrin. Ice as needed. Improved.    Depression/anxiety - continue Remeron. Mood improved.     Hypoalbuminemia/Poor appetite - likely due to above. Continue Remeron.    Insomnia - continue ativan at night which patient states helps his sleep. Trazodone as needed.    Hyponatremia/hypokalemia - resolved. Monitor weekly.    Bowel incontinence - twice since admission, unclear etiology. Monitor. Started fiber. Now resolved. Stopped fiber.    Blood on outside of stool x 2 - looks like hemorrhoids. 4 s/p days of anusol. Continue to monitor. May need outpatient colonoscopy.    Bladder - continent.    DVT prophylaxis - not indicated as patient not a new paraplegic.    A team conference yesterday we confirmed the tentative discharge date of August 22, 2017  His wheelchair has arrived and repaired but has not practiced transfers to it as different height       Total time:  >25 minutes.  I spent greater than 50% of the time for patient care and coordination on this date, including unit/floor time, and face-to-face time with the patient as per assessment and plan above.    Viktor Jara  [place].

## 2017-08-21 NOTE — DISCHARGE PLANNING
Per Hilda at [facility], referral accepted.  Per Michi at [facility], referral for slideboard accepted.

## 2017-08-21 NOTE — CARE PLAN
Problem: Bowel/Gastric:  Goal: Normal bowel function is maintained or improved  Pt. refused scheduled Miralax. LBM 8/20/17.     Problem: Knowledge Deficit  Goal: Knowledge of disease process/condition, treatment plan, diagnostic tests, and medications will improve  Outcome: PROGRESSING AS EXPECTED  Pt. refused scheduled gabapentin during the day. Pt. states that gabapentin doesn't work for him. Will follow up.    Problem: Pain Management  Goal: Pain level will decrease to patient’s comfort goal  Pt. with complaints of 2/10 pain in the right arm. Tylenol was given per patient request with positive results.

## 2017-08-21 NOTE — PROGRESS NOTES
"Rehab Progress Note     Chief complaint: none, follow up history of T9 spinal cord injury, debility/failure to thrive  Date of Service: 8/18/2017    Interval Events (Subjective)  Patient seen and examined. Notes the therapist appreciated patient looks that he is doing well overall. He is tolerating the Neurontin I discussed with him possible increased the dose will follow and decide next week           Objective:  VITAL SIGNS: /72 mmHg  Pulse 79  Temp(Src) 36.6 °C (97.8 °F)  Resp 18  Ht 1.93 m (6' 4\")  Wt 84 kg (185 lb 3 oz)  BMI 22.55 kg/m2  SpO2 98%  Gen: alert, no apparent distress  Neck: Patient was noted to have decreased spasm at this time   CV: mild tachycardia, regular rhythm, no murmurs, no peripheral edema  Resp: clear to ascultation bilaterally, normal respiratory effort  GI: soft, non-tender abdomen, bowel sounds present  Neuro: Unchanged    Current Facility-Administered Medications   Medication Frequency   • gabapentin (NEURONTIN) capsule 100 mg TID   • acetaminophen (TYLENOL) tablet 650 mg Q4HRS PRN    Or   • acetaminophen (TYLENOL) tablet 650 mg Q4HRS PRN   • calcium polycarbophil (FIBERCON) tablet 625 mg TID PRN   • baclofen (LIORESAL) tablet 10 mg TID PRN   • senna-docusate (PERICOLACE or SENOKOT S) 8.6-50 MG per tablet 2 Tab Q EVENING    And   • polyethylene glycol/lytes (MIRALAX) PACKET 1 Packet DAILY    And   • magnesium hydroxide (MILK OF MAGNESIA) suspension 30 mL QDAY PRN   • lactulose 20 GM/30ML solution 30 mL QDAY PRN   • bisacodyl (DULCOLAX) suppository 10 mg QDAY PRN   • oxycodone immediate-release (ROXICODONE) tablet 5 mg Q4HRS PRN   • baclofen (LIORESAL) tablet 10 mg QHS   • mirtazapine (REMERON) orally disintegrating tab 15 mg QHS   • guaiFENesin (ROBITUSSIN) 100 MG/5ML solution 200 mg Q4HRS PRN   • Respiratory Care per Protocol Continuous RT   • Pharmacy Consult Request ...Pain Management Review 1 Each PRN   • tramadol (ULTRAM) 50 MG tablet 50 mg Q4HRS PRN   • artificial " tears 1.4 % ophthalmic solution 1 Drop PRN   • benzocaine-menthol (CEPACOL) lozenge 1 Lozenge Q2HRS PRN   • mag hydrox-al hydrox-simeth (MAALOX PLUS ES or MYLANTA DS) suspension 20 mL Q2HRS PRN   • trazodone (DESYREL) tablet 50 mg QHS PRN   • sodium chloride (OCEAN) 0.65 % nasal spray 2 Spray PRN   • ondansetron (ZOFRAN ODT) dispertab 4 mg Q4HRS PRN   • lorazepam (ATIVAN) tablet 1 mg QHS       Orders Placed This Encounter   Procedures   • Diet Order     Standing Status: Standing      Number of Occurrences: 1      Standing Expiration Date:      Order Specific Question:  Diet:     Answer:  Regular [1]       Assessment:  Active Hospital Problems    Diagnosis   • Debility   • Wrist pain   • Back pain   • Paraplegia (CMS-HCC)   • Chronic pain   • Depression   • Poor appetite   • T9 spinal cord injury (CMS-HCC)   • Failure to thrive (0-17)   • Leukopenia     Dr. Bruce led and attended the weekly conference, and agree with the IDT conference documentation and plan of care as noted below.    Date of conference: 8/10/2017    Admission FIM 61 --> 79    CM/social support: DC destination/dispostion:  Patient lives in a single level apartment.    Referrals: Possible PAS program    DC Needs: Patient has a tub bench, w/c, cushion and cane.  He may need a new w/c and new AFO's.  We have referred patient for evaluation with Joan and Nan.  I have discussed home health follow up and he is okay with this.    Barriers to discharge: Decreasing function.    Strengths: very motivated.    Anticipated DC date: 8/22/2017, as long as home modifications have been made and  has arrived for practice    Home health: PT/OT/RN    Equip: 2 new AFOs, new wheelchair, transfer pole in house, hospital bed?    Follow up: PCP, SCI clinic    Medical Decision Making and Plan:    Labs reviewed. Medications reviewed.    T9 incomplete spinal cord injury -with no movement of right leg, 4/5 left leg, increased tone at knees, patchy sensation in both  legs. Continue therapies. Being evaluated for new bilateral AFOs and new/modified wheelchair. Also in the process of buying a van that is WC accessible.    Debility/Failure to thrive - multifactorial. Continue full rehab program. Much improved.     Pancytopenia - due to poor nutritional intake. Dietician to see patient.     Alcohol overuse - likely using alcohol for his anxiety. No withdrawal issues since admission.    Spasticity - Scheduled baclofen at night. Start PRN TID baclofen during the day for days when he has increasing spasms. Currently under control. Patient is complaining of a headache and clearly had evidence of spasms in the posterior neck region. A one-time dose of tizanidine 2 mg to see if it was beneficial for the patient's headache-like pain that I feel may be a result of neck spasms       Bilateral wrist pain - continue PRN splint on left, for severe OA/ligament tear. Xray right with severe OA, soft tissue swelling, and evidence of old surgeries. S/p 4 days of motrin. Ice as needed. Improved.    Depression/anxiety - continue Remeron. Mood improved.     Hypoalbuminemia/Poor appetite - likely due to above. Continue Remeron.    Insomnia - continue ativan at night which patient states helps his sleep. Trazodone as needed.    Hyponatremia/hypokalemia - resolved. Monitor weekly.    Bowel incontinence - twice since admission, unclear etiology. Monitor. Started fiber. Now resolved. Stopped fiber.    Blood on outside of stool x 2 - looks like hemorrhoids. 4 s/p days of anusol. Continue to monitor. May need outpatient colonoscopy.    Bladder - continent.    DVT prophylaxis - not indicated as patient not a new paraplegic.    A team conference yesterday we confirmed the tentative discharge date of August 22, 2017  If his wheelchair repairs not completely may have to stay longer            Total time:  >25 minutes.  I spent greater than 50% of the time for patient care and coordination on this date, including  unit/floor time, and face-to-face time with the patient as per assessment and plan above.    Frandy Bautista M.D.

## 2017-08-21 NOTE — CARE PLAN
Problem: Bowel/Gastric:  Goal: Normal bowel function is maintained or improved  Outcome: PROGRESSING AS EXPECTED  miralax administered per MAR. Pt continent of 2 bm's today.    Problem: Pain Management  Goal: Pain level will decrease to patient’s comfort goal  Outcome: PROGRESSING AS EXPECTED  Pt denies pain or need for pain medication, prn baclofen administered after dinner for muscle spasms.

## 2017-08-21 NOTE — PROGRESS NOTES
"Rehab Progress Note     Chief complaint: none, follow up history of T9 spinal cord injury, debility/failure to thrive  Date of Service: 8/16/2017    Interval Events (Subjective)  Patient seen and examined. He was seen in occupational therapy No acute events overnight. Spasms currently under control. Hasn't had any more blood on his stools. Patient has had no further headaches  Objective:  VITAL SIGNS: /72 mmHg  Pulse 79  Temp(Src) 36.6 °C (97.8 °F)  Resp 18  Ht 1.93 m (6' 4\")  Wt 84 kg (185 lb 3 oz)  BMI 22.55 kg/m2  SpO2 98%  Gen: alert, no apparent distress  Neck: Patient was noted to have decreased spasm at this time   CV: mild tachycardia, regular rhythm, no murmurs, no peripheral edema  Resp: clear to ascultation bilaterally, normal respiratory effort  GI: soft, non-tender abdomen, bowel sounds present  Neuro: notable for flaccid right leg with muscular atrophy, increased tone at the knee, MMT- left hip, knee, and ankle 4 out of 5, patchy sensory changes in the bilateral legs        Current Facility-Administered Medications   Medication Frequency   • gabapentin (NEURONTIN) capsule 100 mg TID   • acetaminophen (TYLENOL) tablet 650 mg Q4HRS PRN    Or   • acetaminophen (TYLENOL) tablet 650 mg Q4HRS PRN   • calcium polycarbophil (FIBERCON) tablet 625 mg TID PRN   • baclofen (LIORESAL) tablet 10 mg TID PRN   • senna-docusate (PERICOLACE or SENOKOT S) 8.6-50 MG per tablet 2 Tab Q EVENING    And   • polyethylene glycol/lytes (MIRALAX) PACKET 1 Packet DAILY    And   • magnesium hydroxide (MILK OF MAGNESIA) suspension 30 mL QDAY PRN   • lactulose 20 GM/30ML solution 30 mL QDAY PRN   • bisacodyl (DULCOLAX) suppository 10 mg QDAY PRN   • oxycodone immediate-release (ROXICODONE) tablet 5 mg Q4HRS PRN   • baclofen (LIORESAL) tablet 10 mg QHS   • mirtazapine (REMERON) orally disintegrating tab 15 mg QHS   • guaiFENesin (ROBITUSSIN) 100 MG/5ML solution 200 mg Q4HRS PRN   • Respiratory Care per Protocol Continuous " RT   • Pharmacy Consult Request ...Pain Management Review 1 Each PRN   • tramadol (ULTRAM) 50 MG tablet 50 mg Q4HRS PRN   • artificial tears 1.4 % ophthalmic solution 1 Drop PRN   • benzocaine-menthol (CEPACOL) lozenge 1 Lozenge Q2HRS PRN   • mag hydrox-al hydrox-simeth (MAALOX PLUS ES or MYLANTA DS) suspension 20 mL Q2HRS PRN   • trazodone (DESYREL) tablet 50 mg QHS PRN   • sodium chloride (OCEAN) 0.65 % nasal spray 2 Spray PRN   • ondansetron (ZOFRAN ODT) dispertab 4 mg Q4HRS PRN   • lorazepam (ATIVAN) tablet 1 mg QHS       Orders Placed This Encounter   Procedures   • Diet Order     Standing Status: Standing      Number of Occurrences: 1      Standing Expiration Date:      Order Specific Question:  Diet:     Answer:  Regular [1]       Assessment:  Active Hospital Problems    Diagnosis   • Debility   • Wrist pain   • Back pain   • Paraplegia (CMS-HCC)   • Chronic pain   • Depression   • Poor appetite   • T9 spinal cord injury (CMS-HCC)   • Failure to thrive (0-17)   • Leukopenia     Dr. Bruce led and attended the weekly conference, and agree with the IDT conference documentation and plan of care as noted below.    Date of conference: 8/10/2017    Admission FIM 61 --> 79    CM/social support: DC destination/dispostion:  Patient lives in a single level apartment.    Referrals: Possible PAS program    DC Needs: Patient has a tub bench, w/c, cushion and cane.  He may need a new w/c and new AFO's.  We have referred patient for evaluation with Joan and Nan.  I have discussed home health follow up and he is okay with this.    Barriers to discharge: Decreasing function.    Strengths: very motivated.    Anticipated DC date: 8/22/2017, as long as home modifications have been made and  has arrived for practice    Home health: PT/OT/RN    Equip: 2 new AFOs, new wheelchair, transfer pole in house, hospital bed?    Follow up: PCP, SCI clinic    Medical Decision Making and Plan:    Labs reviewed. Medications  reviewed.    T9 incomplete spinal cord injury -with no movement of right leg, 4/5 left leg, increased tone at knees, patchy sensation in both legs. Continue therapies. Being evaluated for new bilateral AFOs and new/modified wheelchair. Also in the process of buying a van that is WC accessible.    Debility/Failure to thrive - multifactorial. Continue full rehab program. Much improved.     Pancytopenia - due to poor nutritional intake. Dietician to see patient.     Alcohol overuse - likely using alcohol for his anxiety. No withdrawal issues since admission.    Spasticity - Scheduled baclofen at night. Start PRN TID baclofen during the day for days when he has increasing spasms. Currently under control. Patient is complaining of a headache and clearly had evidence of spasms in the posterior neck region. A one-time dose of tizanidine 2 mg to see if it was beneficial for the patient's headache-like pain that I feel may be a result of neck spasms       Bilateral wrist pain - continue PRN splint on left, for severe OA/ligament tear. Xray right with severe OA, soft tissue swelling, and evidence of old surgeries. S/p 4 days of motrin. Ice as needed. Improved.    Depression/anxiety - continue Remeron. Mood improved.     Hypoalbuminemia/Poor appetite - likely due to above. Continue Remeron.    Insomnia - continue ativan at night which patient states helps his sleep. Trazodone as needed.    Hyponatremia/hypokalemia - resolved. Monitor weekly.    Bowel incontinence - twice since admission, unclear etiology. Monitor. Started fiber. Now resolved. Stopped fiber.    Blood on outside of stool x 2 - looks like hemorrhoids. 4 s/p days of anusol. Continue to monitor. May need outpatient colonoscopy.    Bladder - continent.    DVT prophylaxis - not indicated as patient not a new paraplegic.    Team conference tomorrow August 17, 2017  Tentative discharge is August 22, 2017  We'll confirm at team conference tomorrow        Total time:   >25 minutes.  I spent greater than 50% of the time for patient care and coordination on this date, including unit/floor time, and face-to-face time with the patient as per assessment and plan above.    Frandy Bautista M.D.

## 2017-08-21 NOTE — CARE PLAN
Problem: Safety  Goal: Will remain free from injury  Outcome: PROGRESSING AS EXPECTED  Pt. Uses call light within reach, with good safety awareness noted No impulsiveness noted. Stayed in bed all night hourly rounding continue.    Problem: Pain Management  Goal: Pain level will decrease to patient’s comfort goal  Outcome: PROGRESSING AS EXPECTED  Pt. Resting comfortably and sleeping well, no complaints made. Repositioned self in bed for comfort. Continue monitor condition.

## 2017-08-22 PROCEDURE — 700102 HCHG RX REV CODE 250 W/ 637 OVERRIDE(OP): Performed by: PHYSICAL MEDICINE & REHABILITATION

## 2017-08-22 PROCEDURE — 97530 THERAPEUTIC ACTIVITIES: CPT

## 2017-08-22 PROCEDURE — 97110 THERAPEUTIC EXERCISES: CPT

## 2017-08-22 PROCEDURE — A9270 NON-COVERED ITEM OR SERVICE: HCPCS | Performed by: PHYSICAL MEDICINE & REHABILITATION

## 2017-08-22 PROCEDURE — 99232 SBSQ HOSP IP/OBS MODERATE 35: CPT | Performed by: PHYSICAL MEDICINE & REHABILITATION

## 2017-08-22 PROCEDURE — 770010 HCHG ROOM/CARE - REHAB SEMI PRIVAT*

## 2017-08-22 PROCEDURE — 97537 COMMUNITY/WORK REINTEGRATION: CPT

## 2017-08-22 PROCEDURE — 97535 SELF CARE MNGMENT TRAINING: CPT

## 2017-08-22 RX ADMIN — BACLOFEN 10 MG: 10 TABLET ORAL at 13:26

## 2017-08-22 RX ADMIN — Medication 1 TABLET: at 20:33

## 2017-08-22 RX ADMIN — ACETAMINOPHEN 650 MG: 325 TABLET, FILM COATED ORAL at 08:34

## 2017-08-22 RX ADMIN — LORAZEPAM 1 MG: 1 TABLET ORAL at 20:34

## 2017-08-22 RX ADMIN — MIRTAZAPINE 15 MG: 15 TABLET, ORALLY DISINTEGRATING ORAL at 20:34

## 2017-08-22 RX ADMIN — BACLOFEN 10 MG: 10 TABLET ORAL at 20:34

## 2017-08-22 RX ADMIN — POLYETHYLENE GLYCOL 3350 1 PACKET: 17 POWDER, FOR SOLUTION ORAL at 08:34

## 2017-08-22 ASSESSMENT — PAIN SCALES - GENERAL
PAINLEVEL_OUTOF10: 4
PAINLEVEL_OUTOF10: 0
PAINLEVEL_OUTOF10: 2

## 2017-08-22 NOTE — PROGRESS NOTES
"Rehab Progress Note     Chief complaint: none, follow up history of T9 spinal cord injury, debility/failure to thrive  Date of Service: 8/22/2017    Interval Events (Subjective)  Interviewed and examined patient in the gym with OT.  Patient concerned as he has not had sufficient training with his current wheelchair and would like to practice some techniques today. Additionally his household items will not be installed until Friday. We discussed that at our huddle today we would discuss his safety and most likely will need tentative discharge of 8/26/17    Wheelchair has arrived but he has not had time to practice.  Home modifications for this Friday.    Discussed patient in team huddle today on 8/22/17.  Per staff would be much safer discharge on Saturday after home installation.     Objective:  VITAL SIGNS: /64 mmHg  Pulse 77  Temp(Src) 36.9 °C (98.5 °F)  Resp 18  Ht 1.93 m (6' 4\")  Wt 84 kg (185 lb 3 oz)  BMI 22.55 kg/m2  SpO2 96%   Gen: alert, no apparent distress  Neck: No visible spasms, mild tenderness to palpation   CV: regular rate, regular rhythm, no murmurs, no peripheral edema  Resp: clear to ascultation bilaterally, normal respiratory effort  GI: soft, non-tender abdomen, bowel sounds present  Neuro: Pushing wheelchair independently in OT practicing turns    Current Facility-Administered Medications   Medication Frequency   • gabapentin (NEURONTIN) capsule 100 mg TID   • acetaminophen (TYLENOL) tablet 650 mg Q4HRS PRN    Or   • acetaminophen (TYLENOL) tablet 650 mg Q4HRS PRN   • calcium polycarbophil (FIBERCON) tablet 625 mg TID PRN   • baclofen (LIORESAL) tablet 10 mg TID PRN   • senna-docusate (PERICOLACE or SENOKOT S) 8.6-50 MG per tablet 2 Tab Q EVENING    And   • polyethylene glycol/lytes (MIRALAX) PACKET 1 Packet DAILY    And   • magnesium hydroxide (MILK OF MAGNESIA) suspension 30 mL QDAY PRN   • lactulose 20 GM/30ML solution 30 mL QDAY PRN   • bisacodyl (DULCOLAX) suppository 10 mg " QDAY PRN   • oxycodone immediate-release (ROXICODONE) tablet 5 mg Q4HRS PRN   • baclofen (LIORESAL) tablet 10 mg QHS   • mirtazapine (REMERON) orally disintegrating tab 15 mg QHS   • guaiFENesin (ROBITUSSIN) 100 MG/5ML solution 200 mg Q4HRS PRN   • Respiratory Care per Protocol Continuous RT   • Pharmacy Consult Request ...Pain Management Review 1 Each PRN   • tramadol (ULTRAM) 50 MG tablet 50 mg Q4HRS PRN   • artificial tears 1.4 % ophthalmic solution 1 Drop PRN   • benzocaine-menthol (CEPACOL) lozenge 1 Lozenge Q2HRS PRN   • mag hydrox-al hydrox-simeth (MAALOX PLUS ES or MYLANTA DS) suspension 20 mL Q2HRS PRN   • trazodone (DESYREL) tablet 50 mg QHS PRN   • sodium chloride (OCEAN) 0.65 % nasal spray 2 Spray PRN   • ondansetron (ZOFRAN ODT) dispertab 4 mg Q4HRS PRN   • lorazepam (ATIVAN) tablet 1 mg QHS       Orders Placed This Encounter   Procedures   • Diet Order     Standing Status: Standing      Number of Occurrences: 1      Standing Expiration Date:      Order Specific Question:  Diet:     Answer:  Regular [1]       Assessment:  Active Hospital Problems    Diagnosis   • Debility   • Wrist pain   • Back pain   • Paraplegia (CMS-HCC)   • Chronic pain   • Depression   • Poor appetite   • T9 spinal cord injury (CMS-HCC)   • Failure to thrive (0-17)   • Leukopenia     CM/social support: DC destination/dispostion:  Patient lives in a single level apartment.    Referrals: Possible PAS program    DC Needs: Patient has a tub bench, w/c, cushion and cane.  He may need a new w/c and new AFO's.  We have referred patient for evaluation with Joan and Nan.  I have discussed home health follow up and he is okay with this.    Barriers to discharge: Decreasing function.    Strengths: very motivated.    Anticipated DC date: 8/22/2017, as long as home modifications have been made and  has arrived for practice    Home health: PT/OT/RN    Equip: 2 new AFOs, new wheelchair, transfer pole in house, hospital bed?    Follow  up: PCP, SCI clinic    Medical Decision Making and Plan:    Labs reviewed. Medications reviewed.    T9 incomplete spinal cord injury -with no movement of right leg, 4/5 left leg, increased tone at knees, patchy sensation in both legs. Continue therapies. Being evaluated for new bilateral AFOs and new/modified wheelchair. Also in the process of buying a van that is WC accessible.    Debility/Failure to thrive - multifactorial. Continue full rehab program. Much improved.     Pancytopenia - due to poor nutritional intake. Dietician to see patient.     Alcohol overuse - likely using alcohol for his anxiety. No withdrawal issues since admission.    Spasticity - Currently on scheduled baclofen only at night. Discussed increasing to TID, at this time patient would like to hold off.     Bilateral wrist pain - continue PRN splint on left, for severe OA/ligament tear. Xray right with severe OA, soft tissue swelling, and evidence of old surgeries. S/p 4 days of motrin. Ice as needed. Improved.    Depression/anxiety - continue Remeron. Mood improved.     Hypoalbuminemia/Poor appetite - likely due to above. Continue Remeron.    Insomnia - continue ativan at night which patient states helps his sleep. Trazodone as needed.    Hyponatremia/hypokalemia - resolved. Monitor weekly.    Bowel incontinence - twice since admission, unclear etiology. Monitor. Started fiber. Now resolved. Stopped fiber.    Blood on outside of stool x 2 - looks like hemorrhoids. 4 s/p days of anusol. Continue to monitor. May need outpatient colonoscopy.    Bladder - continent.    DVT prophylaxis - not indicated as patient not a new paraplegic.    At team huddle on 8/22/17 confirmed that patient would need until Saturday for his home to be a safe discharge.   His wheelchair has arrived and repaired but has not practiced transfers to it as different height       Total time:  >25 minutes.  I spent greater than 50% of the time for patient care and coordination  on this date, including unit/floor time, and face-to-face time with the patient as per assessment and plan above.    Viktor Mesa M.D.

## 2017-08-22 NOTE — CARE PLAN
Problem: Safety  Goal: Will remain free from injury  Outcome: PROGRESSING AS EXPECTED  Pt uses call light consistently and appropriately. Waits for assistance does not attempt self transfer this shift. Able to verbalize needs.         Problem: Knowledge Deficit  Goal: Knowledge of disease process/condition, treatment plan, diagnostic tests, and medications will improve  Outcome: PROGRESSING AS EXPECTED  Pt. is in a good spirit this afternon. New d/c date will be Saturday 8/26/17

## 2017-08-22 NOTE — CARE PLAN
Problem: Safety  Goal: Will remain free from injury  Outcome: PROGRESSING AS EXPECTED  Pt. Uses call light within reach and waits for assistance, good safety awareness noted, hourly rounding continue for safety, no impulsiveness noted.    Problem: Pain Management  Goal: Pain level will decrease to patient’s comfort goal  Outcome: PROGRESSING AS EXPECTED  Pt. Resting comfortably at this time, request pain medication Roxicodone 5 mg.po given for back pain with relief. Also had scheduled Baclofen and Ativan. Sleeping well on and off. Continue monitor condition.

## 2017-08-22 NOTE — PROGRESS NOTES
Bedside report received from night shift RN. Pt sleeping, no distress noted. Call light w/in reach, monitoring in progress.

## 2017-08-22 NOTE — DISCHARGE PLANNING
Case Management;  Patient has discussed home modifications with me.  Some modification items will not arrive until Thursday with probably installation on Friday.  I have confirmed with physician and we will plan on d/c Saturday when home modifications complete.  Patient has his w/c which has been repaired.  He has a slide board delivered from [employer] and 2 cushions to trial for his w/c.  We have referred patient to [facility] and I am awaiting call back from Division on Aging to complete referral for PAS program.  Staff updated.  I will continue to follow.

## 2017-08-23 PROCEDURE — 700102 HCHG RX REV CODE 250 W/ 637 OVERRIDE(OP): Performed by: PHYSICAL MEDICINE & REHABILITATION

## 2017-08-23 PROCEDURE — A9270 NON-COVERED ITEM OR SERVICE: HCPCS | Performed by: PHYSICAL MEDICINE & REHABILITATION

## 2017-08-23 PROCEDURE — 99232 SBSQ HOSP IP/OBS MODERATE 35: CPT | Performed by: PHYSICAL MEDICINE & REHABILITATION

## 2017-08-23 PROCEDURE — 97110 THERAPEUTIC EXERCISES: CPT

## 2017-08-23 PROCEDURE — 770010 HCHG ROOM/CARE - REHAB SEMI PRIVAT*

## 2017-08-23 PROCEDURE — 97535 SELF CARE MNGMENT TRAINING: CPT

## 2017-08-23 PROCEDURE — 97530 THERAPEUTIC ACTIVITIES: CPT

## 2017-08-23 RX ADMIN — POLYETHYLENE GLYCOL 3350 1 PACKET: 17 POWDER, FOR SOLUTION ORAL at 07:48

## 2017-08-23 RX ADMIN — Medication 2 TABLET: at 20:29

## 2017-08-23 RX ADMIN — LORAZEPAM 1 MG: 1 TABLET ORAL at 20:30

## 2017-08-23 RX ADMIN — BACLOFEN 10 MG: 10 TABLET ORAL at 20:30

## 2017-08-23 RX ADMIN — ACETAMINOPHEN 650 MG: 325 TABLET, FILM COATED ORAL at 07:48

## 2017-08-23 RX ADMIN — BACLOFEN 10 MG: 10 TABLET ORAL at 13:30

## 2017-08-23 RX ADMIN — MIRTAZAPINE 15 MG: 15 TABLET, ORALLY DISINTEGRATING ORAL at 20:29

## 2017-08-23 ASSESSMENT — PAIN SCALES - GENERAL
PAINLEVEL_OUTOF10: 0
PAINLEVEL_OUTOF10: 0

## 2017-08-23 NOTE — CARE PLAN
Problem: Safety  Goal: Will remain free from injury  Outcome: PROGRESSING AS EXPECTED  Reviewed fall and safety precautions with patient. Pt uses call light consistently and appropriately and has not attempted self transfer this shift. Able to verbalize needs. Call light and belongings within reach, bed alarm in use, bed in lowest position, treaded socks in use, and hourly rounding in place.        Problem: Pain Management  Goal: Pain level will decrease to patient’s comfort goal  Outcome: PROGRESSING AS EXPECTED  Patient denied the need for pain interventions this shift and has been observed sleeping calmly on rounding. Will continue to monitor for pain.

## 2017-08-23 NOTE — REHAB-ACTIVITY IDT TEAM NOTE
ACT  Recreation/Community     Leisure Competence Measure  Leisure Awareness: Supervision  Leisure Attitude: Modified Independent  Leisure Skills: Supervision  Cultural / Social Behaviors: Supervision  Interpersonal Skills: Supervision  Community Integration Skills:  (to be assessed 8/25/17)  Social Contact: Modified Independent         Recreation Therapy Problems           Problem: Recreation Therapy     Dates: Start: 08/08/17       Goal: STG-Within one week, patient will     Dates: Start: 08/08/17      Description: Investigate new or previous leisure pursuits to resume post discharge        Goal: LTG-By discharge, patient will     Dates: Start: 08/08/17      Description: identify 2 new leisure pursuits for use post discharge              Strengths:   Motivated, cooperative  Barriers:   Paraplegia, pain,  resistant to new education    Section completed by:  Lara Briceno, CTRS

## 2017-08-23 NOTE — REHAB-PHARMACY IDT TEAM NOTE
Pharmacy  Pharmacy  Antibiotics/Antifungals/Antivirals:  Medication:      Active Orders     None        Route:         None  Stop Date:  N/A  Reason:   Antihypertensives/Cardiac:  Medication:    Orders     None        Patient Vitals for the past 24 hrs:   BP Pulse   08/23/17 0700 107/66 mmHg 70   08/22/17 1928 106/60 mmHg 75       Anticoagulation:  Medication:  Not applicable  INR:      Other key medications:       A review of the medication list reveals no issues at this time.    Section completed by:  Carroll Ricks RPH

## 2017-08-24 PROCEDURE — 700102 HCHG RX REV CODE 250 W/ 637 OVERRIDE(OP): Performed by: PHYSICAL MEDICINE & REHABILITATION

## 2017-08-24 PROCEDURE — 97110 THERAPEUTIC EXERCISES: CPT

## 2017-08-24 PROCEDURE — A9270 NON-COVERED ITEM OR SERVICE: HCPCS | Performed by: PHYSICAL MEDICINE & REHABILITATION

## 2017-08-24 PROCEDURE — 770010 HCHG ROOM/CARE - REHAB SEMI PRIVAT*

## 2017-08-24 PROCEDURE — 99233 SBSQ HOSP IP/OBS HIGH 50: CPT | Performed by: PHYSICAL MEDICINE & REHABILITATION

## 2017-08-24 PROCEDURE — 97530 THERAPEUTIC ACTIVITIES: CPT

## 2017-08-24 RX ADMIN — Medication 2 TABLET: at 20:25

## 2017-08-24 RX ADMIN — BACLOFEN 10 MG: 10 TABLET ORAL at 15:49

## 2017-08-24 RX ADMIN — LORAZEPAM 1 MG: 1 TABLET ORAL at 20:26

## 2017-08-24 RX ADMIN — BACLOFEN 10 MG: 10 TABLET ORAL at 09:55

## 2017-08-24 RX ADMIN — POLYETHYLENE GLYCOL 3350 1 PACKET: 17 POWDER, FOR SOLUTION ORAL at 09:55

## 2017-08-24 RX ADMIN — BACLOFEN 10 MG: 10 TABLET ORAL at 20:26

## 2017-08-24 RX ADMIN — MIRTAZAPINE 15 MG: 15 TABLET, ORALLY DISINTEGRATING ORAL at 20:26

## 2017-08-24 ASSESSMENT — PAIN SCALES - GENERAL
PAINLEVEL_OUTOF10: 5
PAINLEVEL_OUTOF10: 0

## 2017-08-24 NOTE — CARE PLAN
Problem: Safety  Goal: Will remain free from injury  Outcome: PROGRESSING AS EXPECTED  Patient demonstrates good safety technique this shift.  Asks for assistance when needed and does not attempt self transfer.  Able to verbalize needs.  Will continue to monitor.    Problem: Infection  Goal: Will remain free from infection  Outcome: PROGRESSING AS EXPECTED  Pt shows no s/s of infection and is afebrile. Will continue to monitor.

## 2017-08-24 NOTE — PROGRESS NOTES
Received bedside report; assumed pt care. Pt A/O x 4, calm, and stable. Pt is not complaining of pain or discomfort. Pt sitting up in chair, call light within reach when in room, safety precautions in place. Will continue to monitor.

## 2017-08-24 NOTE — CARE PLAN
Problem: Safety  Goal: Will remain free from falls  Intervention: Implement fall precautions  Patient educated on fall risk and encouraged to call for assistance if necessary. Patient calls appropriately for assistance when needed.  Bed locked and in low position.  Call light and personal belongings within reach.  Hourly rounding performed to ensure safety of patient and needs are met.        Problem: Pain Management  Goal: Pain level will decrease to patient’s comfort goal  Intervention: Follow pain managment plan developed in collaboration with patient and Interdisciplinary Team  Patient complains of no shift.  Asked for his scheduled Baclofen for preventative pain relief.  Will continue to monitor and assess patient's pain level throughout shift.

## 2017-08-24 NOTE — PROGRESS NOTES
"Rehab Progress Note     Chief complaint: none, follow up history of T9 spinal cord injury, debility/failure to thrive    Date of Service: 8/23/2017    Interval Events (Subjective)  Interviewed and examined patient in the gym with PT.  Patient sitting up and reporting he has been working on transfers with his new wheelchair. Would like to work on outside activities. Discussed at lengths home installation difficulty but he is optimistic they will finish Friday.    Discussed patient in team huddle today on 8/22/17.  Per staff would be much safer discharge on Saturday after home installation.     Objective:  VITAL SIGNS: /65 mmHg  Pulse 73  Temp(Src) 36.5 °C (97.7 °F)  Resp 18  Ht 1.93 m (6' 4\")  Wt 84 kg (185 lb 3 oz)  BMI 22.55 kg/m2  SpO2 97%   Gen: alert, no apparent distress  CV: regular rate, regular rhythm, no murmurs, no peripheral edema  Resp: clear to ascultation bilaterally, normal respiratory effort  GI: soft, non-tender abdomen, bowel sounds present  Neuro: Pushing wheelchair independently in PT    Current Facility-Administered Medications   Medication Frequency   • acetaminophen (TYLENOL) tablet 650 mg Q4HRS PRN    Or   • acetaminophen (TYLENOL) tablet 650 mg Q4HRS PRN   • calcium polycarbophil (FIBERCON) tablet 625 mg TID PRN   • baclofen (LIORESAL) tablet 10 mg TID PRN   • senna-docusate (PERICOLACE or SENOKOT S) 8.6-50 MG per tablet 2 Tab Q EVENING    And   • polyethylene glycol/lytes (MIRALAX) PACKET 1 Packet DAILY    And   • magnesium hydroxide (MILK OF MAGNESIA) suspension 30 mL QDAY PRN   • lactulose 20 GM/30ML solution 30 mL QDAY PRN   • bisacodyl (DULCOLAX) suppository 10 mg QDAY PRN   • oxycodone immediate-release (ROXICODONE) tablet 5 mg Q4HRS PRN   • baclofen (LIORESAL) tablet 10 mg QHS   • mirtazapine (REMERON) orally disintegrating tab 15 mg QHS   • guaiFENesin (ROBITUSSIN) 100 MG/5ML solution 200 mg Q4HRS PRN   • Respiratory Care per Protocol Continuous RT   • Pharmacy Consult " Request ...Pain Management Review 1 Each PRN   • tramadol (ULTRAM) 50 MG tablet 50 mg Q4HRS PRN   • artificial tears 1.4 % ophthalmic solution 1 Drop PRN   • benzocaine-menthol (CEPACOL) lozenge 1 Lozenge Q2HRS PRN   • mag hydrox-al hydrox-simeth (MAALOX PLUS ES or MYLANTA DS) suspension 20 mL Q2HRS PRN   • trazodone (DESYREL) tablet 50 mg QHS PRN   • sodium chloride (OCEAN) 0.65 % nasal spray 2 Spray PRN   • ondansetron (ZOFRAN ODT) dispertab 4 mg Q4HRS PRN   • lorazepam (ATIVAN) tablet 1 mg QHS       Orders Placed This Encounter   Procedures   • Diet Order     Standing Status: Standing      Number of Occurrences: 1      Standing Expiration Date:      Order Specific Question:  Diet:     Answer:  Regular [1]       Assessment:  Active Hospital Problems    Diagnosis   • Debility   • Wrist pain   • Back pain   • Paraplegia (CMS-HCC)   • Chronic pain   • Depression   • Poor appetite   • T9 spinal cord injury (CMS-HCC)   • Failure to thrive (0-17)   • Leukopenia     CM/social support: DC destination/dispostion:  Patient lives in a single level apartment.    Referrals: Possible PAS program    DC Needs: Patient has a tub bench, w/c, cushion and cane.  He may need a new w/c and new AFO's.  We have referred patient for evaluation with Joan and Nan.  I have discussed home health follow up and he is okay with this.    Barriers to discharge: Decreasing function.    Strengths: very motivated.    Anticipated DC date: 8/22/2017, as long as home modifications have been made and  has arrived for practice    Home health: PT/OT/RN    Equip: 2 new AFOs, new wheelchair, transfer pole in house, hospital bed?    Follow up: PCP, SCI clinic    Medical Decision Making and Plan:    Labs reviewed. Medications reviewed.    T9 incomplete spinal cord injury -with no movement of right leg, 4/5 left leg, increased tone at knees, patchy sensation in both legs. Continue therapies. Being evaluated for new bilateral AFOs and new/modified  wheelchair. Also in the process of buying a van that is WC accessible.  -Discussed home modifications. Should be completed by 8/25/17 with DC 8/26    Debility/Failure to thrive - multifactorial. Continue full rehab program. Much improved.     Pancytopenia - due to poor nutritional intake. Dietician to see patient.     Alcohol overuse - likely using alcohol for his anxiety. No withdrawal issues since admission.    Spasticity - Currently on scheduled baclofen only at night. Discussed increasing to TID, at this time patient would like to hold off.     Bilateral wrist pain - continue PRN splint on left, for severe OA/ligament tear. Xray right with severe OA, soft tissue swelling, and evidence of old surgeries. S/p 4 days of motrin. Ice as needed. Improved.    Depression/anxiety - continue Remeron. Mood improved.     Hypoalbuminemia/Poor appetite - likely due to above. Continue Remeron.    Insomnia - continue ativan at night which patient states helps his sleep. Trazodone as needed.    Hyponatremia/hypokalemia - resolved. Monitor weekly.    Bowel incontinence - twice since admission, unclear etiology. Monitor. Started fiber. Now resolved. Stopped fiber.    Blood on outside of stool x 2 - looks like hemorrhoids. 4 s/p days of anusol. Continue to monitor. May need outpatient colonoscopy.    Bladder - continent.    DVT prophylaxis - not indicated as patient not a new paraplegic.    At team huddle on 8/22/17 confirmed that patient would need until Saturday for his home to be a safe discharge.   Continues to improve with modified wheelchair.      Total time:  >25 minutes.  I spent greater than 50% of the time for patient care and coordination on this date, including unit/floor time, and face-to-face time with the patient as per assessment and plan above.      Viktor Mesa M.D.

## 2017-08-24 NOTE — REHAB-NURSING IDT TEAM NOTE
Nursing  Nursing  Diet/Nutrition:  Regular and Thin Liquids  Medication Administration:  Whole with Liquid Wash  % consumed at meals in last 24 hours:  Consumed % of meals per documentation.  Breakfast:  100%   Lunch:  50%  Dinner:  90%   Snack schedule:  HS  Supplement:  Boost Plus  Appetite:  Good  Fluid Intake/Output in past 24 hours: In: 600 [P.O.:600]  Out: 600   Admit Weight:  Weight: 83.008 kg (183 lb)  Weight Last 7 Days       Pain Issues:    Location:  --  --         Severity:  Denies   Scheduled pain medications:  None     PRN pain medications used in last 24 hours:  acetaminophen (TYLENOL)  Also takes Baclofen.   Non Pharmacologic Interventions:  relaxation, repositioned and rest  Effectiveness of pain management plan:  good=patient states acceptable comfort after interventions    Bowel:    Bowel Assist Initial Score:  2 - Max Assistance  Bowel Assist Current Score:  4 - Minimal Assistance  Bowl Accidents in last 7 days:  0  Last bowel movement: 08/23/17  Stool: Large, Formed, Soft     Usual bowel pattern:  daily  Scheduled bowel medications:  senna-docusate (PERICOLACE or SENOKOT S)  and polyethylene glycol/lytes (MIRALAX)   PRN bowel medications used in last 24 hours:  None  Nursing Interventions:  Scheduled medication, Supervision, Verbal cueing, Positioning on commode/toilet  Effectiveness of bowel program:   good=regular, predictable, controlled emptying of bowel  Bladder:    Bladder Assist Initial Score:  5 - Standby Prompting/Supervision or Set-up  Bladder Assist Current Score:  5 - Standby Prompting/Supervision or Set-up  Bladder Accidents in last 7 days:  0  PVR range for past 24-48 hours: --  Intermittent Catheterization:  0  Medications affecting bladder:  None    Time void schedule/voiding pattern:  Voiding every 2-6 hours  Interventions:  Supervision, Verbal cueing, Emptying of device, Urinal  Effectiveness of bladder training:  Good=regular, predictable, emptying of bladder, patient  initiates time voiding      Sleep/wake cycle:   Average hours slept:  Sleeps 4-6 hours without waking  Sleep medication usage:  None    Patient/Family Training/Education:  Diet/Nutrition, Fall Prevention, General Self Care, Medication Management, Safe Transfers and Safety  Strengths: Alert and oriented, Willingly participates in therapeutic activities, Able to follow instructions, Pleasant and cooperative, Effective communication skills, Good carryover of learning, Motivated for self care and independence and Manages pain appropriately   Barriers:   Generalized weakness and Limited mobility            Nursing Problems           Problem: Bowel/Gastric:     Goal: Normal bowel function is maintained or improved     Flowsheet: Taken at 08/17/17 1748    Last BM 08/17/17 by Lilly Castorena RHardikNHardik    Number of Times Stooled 1 by Lilly Castorena R.N.    Taken at 07/30/17 1840    Last BM 07/30/17 by Teresa Frye       Goal: Will not experience complications related to bowel motility           Problem: Discharge Barriers/Planning     Goal: Patient's continuum of care needs will be met           Problem: Infection     Goal: Will remain free from infection           Problem: Knowledge Deficit     Goal: Knowledge of disease process/condition, treatment plan, diagnostic tests, and medications will improve         Goal: Knowledge of the prescribed therapeutic regimen will improve           Problem: Mobility     Goal: Risk for activity intolerance will decrease           Problem: Pain Management     Goal: Pain level will decrease to patient's comfort goal     Flowsheet: Taken at 08/17/17 1748    Nurse Pain Scale 0 - 10  3 by Lilly Castorena, R.N.    Non Verbal Scale  Calm by Lilly Castorena RREJI    Comfort Goal 0;Comfort at Rest;Comfort with Movement;Perform Activity by Lilly Castorena, R.N.         Problem: Safety     Goal: Will remain free from injury         Goal: Will remain free from falls           Problem: Skin Integrity     Goal:  Risk for impaired skin integrity will decrease           Problem: Venous Thromboembolism (VTW)/Deep Vein Thrombosis (DVT) Prevention:     Goal: Patient will participate in Venous Thrombosis (VTE)/Deep Vein Thrombosis (DVT)Prevention Measures                Long Term Goals:   At discharge patient will be able to function safely at home and in the community with support.    Section completed by:  Nesha Vidal R.N.
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
## 2017-08-24 NOTE — PROGRESS NOTES
Report received from day shift RN and assumed care of patient.  A&Ox4; no complaints of pain at this time.  Patient calm and comfortably resting in bed.  Call light and personal belongings within reach.

## 2017-08-24 NOTE — CARE PLAN
Problem: Bathing  Goal: STG-Within one week, patient will bathe  1) Individualized Goal: With mod I using AE and DME prn  2) Interventions: OT Orthotics Training, OT E Stim Attended, OT Self Care/ADL, OT Cognitive Skill Dev, OT Community Reintegration, OT Manual Ther Technique, OT Neuro Re-Ed/Balance, OT Sensory Int Techniques, OT Therapeutic Activity, OT Evaluation and OT Therapeutic Exercise  Outcome: PROGRESSING AS EXPECTED    Problem: Functional Transfers  Goal: STG-Within one week, patient will transfer to tub/shower  1) Individualized Goal: With modified independence using DME as needed.   2) Interventions: OT Self Care/ADL, OT Neuro Re-Ed/Balance, OT Therapeutic Activity and OT Therapeutic Exercise  Outcome: PROGRESSING AS EXPECTED    Problem: IADL’s  Goal: STG-Within one week, patient will  1) Individualized Goal: Prepare a simple meal with supervision using DME as needed.   2) Interventions: OT Self Care/ADL, OT Neuro Re-Ed/Balance, OT Therapeutic Activity and OT Therapeutic Exercise  Outcome: MET Date Met:  08/24/17

## 2017-08-24 NOTE — DISCHARGE PLANNING
Case Management;  Called Anaya at [facility] and provided additional information that she needed.  They will contact patient to complete assessment for possible home services.  I have met with patient and updated him. On track for d/c Saturday.  Patient is in agreement with all plans.

## 2017-08-24 NOTE — CARE PLAN
Problem: Mobility  Goal: STG-Within one week, patient will propel wheelchair community  1) Individualized goal: Patient will propel wc mod I indoors and outdoors over various inclines and surfaces >300 ft. STG=LTG  2) Interventions: PT E Stim Attended, PT Orthotics Training, PT Gait Training, PT Self Care/Home Eval, PT Therapeutic Exercises, PT Neuro Re-Ed/Balance, PT Aquatic Therapy, PT Therapeutic Activity, PT Manual Therapy and PT Evaluation.  Outcome: MET Date Met:  08/24/17    Problem: Mobility Transfers  Goal: STG-Within one week, patient will transfer bed to chair  1) Individualized goal: Patient will transfer mod I wc <> bed/ mod I bed mobility, STG = LTG  2) Interventions: PT E Stim Attended, PT Orthotics Training, PT Gait Training, PT Self Care/Home Eval, PT Therapeutic Exercises, PT Neuro Re-Ed/Balance, PT Aquatic Therapy, PT Therapeutic Activity, PT Manual Therapy and PT Evaluation.  Outcome: NOT MET  Requiring SPV for transfers

## 2017-08-24 NOTE — REHAB-PT IDT TEAM NOTE
Physical Therapy  Mobility  Bed mobility:   Mod I  Bed /Chair/Wheelchair Transfer Initial:  1 - Total Assistance  Bed /Chair/Wheelchair Transfer Current:  5 - Standby Prompting/Supervision or Set-up   Bed/Chair/Wheelchair Transfer Description:   (SPV squat pivot EOB>w/c no SB)  Walk Initial:  0 - Not tested, patient refused  Walk Current:  0 - Not tested,medical condition   Walk Description:     Wheelchair Initial:  6 - Modified Independent  Wheelchair Current:  6 - Modified Independent   Wheelchair Description:   (1490 ft outdoors over pavement and asphalt, with significant inclines and declines as well as curb cut-outs and uneven surfaces, Mod I )  Stairs Initial:  0 - Not tested,medical condition  Stairs Current: 0 - Not tested,medical condition   Stairs Description:    Patient/Family Training/Education:  Continued education on safety with functional mobility  DME/DC Recommendations:  Custom lightweight w/c, pressure relieving cushion, slideboard  Strengths:  Effective communication skills, Independent PLOF, Making steady progress towards goals, Motivated for self care and independence, Pleasant and cooperative and Willingly participates in therapeutic activities  Barriers:   Decreased endurance and Generalized weakness  # of short term goals set= 2  # of short term goals met=1        Physical Therapy Problems           Problem: Mobility Transfers     Dates: Start: 07/26/17       Goal: STG-Within one week, patient will transfer bed to chair     Dates: Start: 08/24/17      Description: 1) Individualized goal: Patient will transfer mod I wc <> bed/ mod I bed mobility, STG = LTG    2) Interventions: PT E Stim Attended, PT Orthotics Training, PT Gait Training, PT Self Care/Home Eval, PT Therapeutic Exercises, PT Neuro Re-Ed/Balance, PT Aquatic Therapy, PT Therapeutic Activity, PT Manual Therapy and PT Evaluation.        Note: Goal Note filed on 08/24/17 5084 by Hilda Guaman DPT    Goal: STG-Within one week,  patient will transfer bed to chair  Outcome: NOT MET  Requiring SPV for transfers            Problem: PT-Long Term Goals     Dates: Start: 07/26/17       Goal: LTG-By discharge, patient will transfer one surface to another     Dates: Start: 07/26/17      Description: 1) Individualized goal:  Patient will transfer mod I wc <> bed/ mod I bed mobility     2) Interventions:  PT E Stim Attended, PT Orthotics Training, PT Gait Training, PT Self Care/Home Eval, PT Therapeutic Exercises, PT Neuro Re-Ed/Balance, PT Aquatic Therapy, PT Therapeutic Activity, PT Manual Therapy and PT Evaluation.            Goal: LTG-By discharge, patient will propel wheelchair     Dates: Start: 07/26/17      Description: 1) Individualized goal:  Patient will propel wc mod I indoors and outdoors over various inclines and surfaces >300 ft.    2) Interventions:  PT E Stim Attended, PT Orthotics Training, PT Gait Training, PT Self Care/Home Eval, PT Therapeutic Exercises, PT Neuro Re-Ed/Balance, PT Aquatic Therapy, PT Therapeutic Activity, PT Manual Therapy and PT Evaluation.                    Section completed by:  Hilda Guaman DPT

## 2017-08-24 NOTE — REHAB-OT IDT TEAM NOTE
Occupational Therapy  Activities of Daily Living  Eating Initial:  5 - Standby Prompting/Supervision or Set-up  Eating Current:  7 - Independent   Eating Description:  Increased time, Supervision for safety, Verbal cueing  Grooming Initial:  5 - Standby Prompting/Supervision or Set-up  Grooming Current:  6 - Modified Independent   Grooming Description:   (wheelchair level)  Bathing Initial:  5 - Standby Prompting/Supervision or Set-up  Bathing Current:  5 - Standby Prompting/Supervision or Set-up   Bathing Description:  Grab bar, Tub bench, Hand held shower, Increased time, Set-up of equipment  Upper Body Dressing Initial:  5 - Standby Prompting/Supervision or Set-up  Upper Body Dressing Current:  6 - Modified Independent   Upper Body Dressing Description:   (Mod I donning T-shirt)  Lower Body Dressing Initial:  5 - Standby Prompting/Supervsion or Set-up  Lower Body Dressing Current:  4 - Minimal Assistance   Lower Body Dressing Description:  4 - Minimal Assistance for sequencing and poor technique   Toileting Initial:  5 - Standby Prompting/Supervision or Set-up  Toileting Current:  6 - Modified Independent   Toileting Description:   (mod I urinal use )  Toilet Transfer Initial:  0 - Not tested, patient refused  Toilet Transfer Current:  5 - Standby Prompting/Supervision or Set-up   Toilet Transfer Description:  5 - Standby Prompting/Supervision or Set-up  Tub / Shower Transfer Initial:  1 - Total Assistance  Tub / Shower Transfer Current:  5 - Standby Prompting/Supervision or Set-up   Tub / Shower Transfer Description:  Grab bar, Supervision for safety (sba, lateral scoot transfer)  IADL: simple meal prep with set-up (fried an egg), community outing scheduled tomorrow    Family Training/Education:  ongoing  DME/DC Recommendations: raised toilet seat, grab bars by toilet and shower, vertical transfer pole by bedside, lowering bed height, slide board     Strengths:  Able to follow instructions, Alert and oriented,  Effective communication skills, Good endurance, Making steady progress towards goals and Motivated for self care and independence  Barriers:  Generalized weakness, Limited mobility, Poor balance and Uncooperative during some treatment sessions     # of short term goals set= 3    # of short term goals met= 1          Occupational Therapy Goals           Problem: Bathing     Dates: Start: 07/26/17       Goal: STG-Within one week, patient will bathe     Dates: Start: 07/26/17      Description: 1) Individualized Goal:  With mod I using AE and DME prn    2) Interventions:  OT Orthotics Training, OT E Stim Attended, OT Self Care/ADL, OT Cognitive Skill Dev, OT Community Reintegration, OT Manual Ther Technique, OT Neuro Re-Ed/Balance, OT Sensory Int Techniques, OT Therapeutic Activity, OT Evaluation and OT Therapeutic Exercise              Problem: Functional Transfers     Dates: Start: 07/26/17       Goal: STG-Within one week, patient will transfer to tub/shower     Dates: Start: 08/17/17      Description: 1) Individualized Goal:  With modified independence using DME as needed.     2) Interventions:  OT Self Care/ADL, OT Neuro Re-Ed/Balance, OT Therapeutic Activity and OT Therapeutic Exercise              Problem: OT Long Term Goals     Dates: Start: 07/27/17       Goal: LTG-By discharge, patient will complete basic self care tasks     Dates: Start: 07/27/17      Description: 1) Individualized Goal: modified independent at wheelchair level    2) Interventions:  OT Self Care/ADL and OT Neuro Re-Ed/Balance            Goal: LTG-By discharge, patient will perform bathroom transfers     Dates: Start: 07/27/17      Description: 1) Individualized Goal: modified independent wheelchair level    2) Interventions:  OT Self Care/ADL and OT Neuro Re-Ed/Balance            Goal: LTG-By discharge, patient will complete basic home management     Dates: Start: 07/27/17      Description: 1) Individualized Goal:  Modified independent at a  wheelchair level.    2) Interventions:  OT Self Care/ADL, OT Community Reintegration, OT Neuro Re-Ed/Balance, OT Therapeutic Activity and OT Therapeutic Exercise                  Section completed by:  GOLD Ortega/MP Garrido/EILEEN

## 2017-08-24 NOTE — REHAB-CM IDT TEAM NOTE
Case Management   DC Planning  DC destination/dispostion:  Patient lives alone in a single level apartment.  He drives a handicapped van.    Referrals: [facility] program    DC Needs:  His w/c has been modified by [employer].  We have ordered slide board and new cushion from [employer].  Patient is having home modifications done by [facility] program.  His bed is being lowered and vertical pole installed.    Barriers to discharge:  Lives alone     Strengths: supportive friends    Section completed by:  Ida Hays R.N.

## 2017-08-24 NOTE — PROGRESS NOTES
"Rehab Progress Note     Chief complaint: none, follow up history of T9 spinal cord injury, debility/failure to thrive    Date of Service: 8/24/2017    Interval Events (Subjective)  Interviewed and examined patient in patient's room.  Patient reports good progress with transfers in wheelchair. He reports he is trying different level (higher and lower) transfers and they are going better than expected. He reports he is in discussion with wheelchair accessible van.  His home modifications are on schedule.    ROS: Denies pain, denies dysuria or frequency. No N/V    IDT conference 8/24/17  I was present and led the interdisciplinary conference meeting on 8/24/17    RN:  Regular diet, thin liquids med whole   % of meals  I&O good  Last BM 23rd  Pain in wrist with APAP and baclofen    PT:  Darrin with bed mobility  Sup-Darrin with txf w squat pivot  Darrin in wheelchair    OT:  Eating independent  Groom Darrin  Bathing super-setup probably Darrin  UB dress Darrin  LB dressing Grace - technique and sequencing  Toilet Darrin  Toilet txf - supervision  Shower txf - sup    CM: HH will need to borrow slide board    Discharge 8/26/17    Objective:  VITAL SIGNS: /65 mmHg  Pulse 73  Temp(Src) 36.5 °C (97.7 °F)  Resp 18  Ht 1.93 m (6' 4\")  Wt 84 kg (185 lb 3 oz)  BMI 22.55 kg/m2  SpO2 97%   Gen: alert, no apparent distress  CV: regular rate, regular rhythm, no murmurs, no peripheral edema  Resp: clear to ascultation bilaterally, normal respiratory effort  GI: soft, non-tender abdomen, bowel sounds present  Neuro: No change in neuro status    Current Facility-Administered Medications   Medication Frequency   • acetaminophen (TYLENOL) tablet 650 mg Q4HRS PRN    Or   • acetaminophen (TYLENOL) tablet 650 mg Q4HRS PRN   • calcium polycarbophil (FIBERCON) tablet 625 mg TID PRN   • baclofen (LIORESAL) tablet 10 mg TID PRN   • senna-docusate (PERICOLACE or SENOKOT S) 8.6-50 MG per tablet 2 Tab Q EVENING    And   • polyethylene " glycol/lytes (MIRALAX) PACKET 1 Packet DAILY    And   • magnesium hydroxide (MILK OF MAGNESIA) suspension 30 mL QDAY PRN   • lactulose 20 GM/30ML solution 30 mL QDAY PRN   • bisacodyl (DULCOLAX) suppository 10 mg QDAY PRN   • oxycodone immediate-release (ROXICODONE) tablet 5 mg Q4HRS PRN   • baclofen (LIORESAL) tablet 10 mg QHS   • mirtazapine (REMERON) orally disintegrating tab 15 mg QHS   • guaiFENesin (ROBITUSSIN) 100 MG/5ML solution 200 mg Q4HRS PRN   • Respiratory Care per Protocol Continuous RT   • Pharmacy Consult Request ...Pain Management Review 1 Each PRN   • tramadol (ULTRAM) 50 MG tablet 50 mg Q4HRS PRN   • artificial tears 1.4 % ophthalmic solution 1 Drop PRN   • benzocaine-menthol (CEPACOL) lozenge 1 Lozenge Q2HRS PRN   • mag hydrox-al hydrox-simeth (MAALOX PLUS ES or MYLANTA DS) suspension 20 mL Q2HRS PRN   • trazodone (DESYREL) tablet 50 mg QHS PRN   • sodium chloride (OCEAN) 0.65 % nasal spray 2 Spray PRN   • ondansetron (ZOFRAN ODT) dispertab 4 mg Q4HRS PRN   • lorazepam (ATIVAN) tablet 1 mg QHS       Orders Placed This Encounter   Procedures   • Diet Order     Standing Status: Standing      Number of Occurrences: 1      Standing Expiration Date:      Order Specific Question:  Diet:     Answer:  Regular [1]       Assessment:  Active Hospital Problems    Diagnosis   • Debility   • Wrist pain   • Back pain   • Paraplegia (CMS-HCC)   • Chronic pain   • Depression   • Poor appetite   • T9 spinal cord injury (CMS-HCC)   • Failure to thrive (0-17)   • Leukopenia       Follow up: PCP, SCI clinic    Medical Decision Making and Plan:    Labs reviewed. Medications reviewed.    T9 incomplete spinal cord injury -with no movement of right leg, 4/5 left leg, increased tone at knees, patchy sensation in both legs. Continue therapies. Being evaluated for new bilateral AFOs and new/modified wheelchair. Also in the process of buying a van that is WC accessible.  -Discussed home modifications - on target for  completion 8/25/17 with discharge on 8/26    Debility/Failure to thrive - multifactorial. Continue full rehab program. Much improved.     Pancytopenia - due to poor nutritional intake. Dietician to see patient.     Alcohol overuse - likely using alcohol for his anxiety. No withdrawal issues since admission.    Spasticity - Currently on scheduled baclofen only at night. Discussed increasing to TID, at this time patient would like to hold off.     Bilateral wrist pain - continue PRN splint on left, for severe OA/ligament tear. Xray right with severe OA, soft tissue swelling, and evidence of old surgeries. S/p 4 days of motrin. Ice as needed. Improved.    Depression/anxiety - continue Remeron. Mood improved.     Hypoalbuminemia/Poor appetite - likely due to above. Continue Remeron.    Insomnia - continue ativan at night which patient states helps his sleep. Trazodone as needed.    Hyponatremia/hypokalemia - resolved. Monitor weekly.    Bowel incontinence - twice since admission, unclear etiology. Monitor. Started fiber. Now resolved. Stopped fiber.    Blood on outside of stool x 2 - looks like hemorrhoids. 4 s/p days of anusol. Continue to monitor. May need outpatient colonoscopy.    Bladder - continent.    DVT prophylaxis - not indicated as patient not a new paraplegic.    At IDT confirmed that patient would need until Saturday for his home to be a safe discharge. 8/26/17  Continues to improve with modified wheelchair.      Total time:  >35 minutes.  I spent greater than 50% of the time for patient care and coordination on this date, including unit/floor time, and face-to-face time with the patient as per assessment and plan above.  Discussed discharge plan, home modifications and wheelchair van today.  Patient was discussed in IDT please see details above.    Viktor Mesa M.D.

## 2017-08-24 NOTE — DISCHARGE PLANNING
Case Management;  I have received a call back from Anaya Wyatt with Division of Aging.  She will assess which program patient might qualify for for some homemaker services.  Will follow.

## 2017-08-25 PROCEDURE — A9270 NON-COVERED ITEM OR SERVICE: HCPCS | Performed by: PHYSICAL MEDICINE & REHABILITATION

## 2017-08-25 PROCEDURE — 97530 THERAPEUTIC ACTIVITIES: CPT

## 2017-08-25 PROCEDURE — 700102 HCHG RX REV CODE 250 W/ 637 OVERRIDE(OP): Performed by: PHYSICAL MEDICINE & REHABILITATION

## 2017-08-25 PROCEDURE — 99239 HOSP IP/OBS DSCHRG MGMT >30: CPT | Performed by: PHYSICAL MEDICINE & REHABILITATION

## 2017-08-25 PROCEDURE — 97535 SELF CARE MNGMENT TRAINING: CPT

## 2017-08-25 PROCEDURE — 770010 HCHG ROOM/CARE - REHAB SEMI PRIVAT*

## 2017-08-25 PROCEDURE — 97110 THERAPEUTIC EXERCISES: CPT

## 2017-08-25 RX ORDER — BACLOFEN 10 MG/1
10 TABLET ORAL EVERY 8 HOURS PRN
Qty: 90 TAB | Refills: 1 | Status: ON HOLD | OUTPATIENT
Start: 2017-08-25 | End: 2022-06-05

## 2017-08-25 RX ORDER — MIRTAZAPINE 15 MG/1
15 TABLET, ORALLY DISINTEGRATING ORAL
Qty: 30 TAB | Refills: 1 | Status: SHIPPED | OUTPATIENT
Start: 2017-08-25 | End: 2020-08-05

## 2017-08-25 RX ADMIN — MIRTAZAPINE 15 MG: 15 TABLET, ORALLY DISINTEGRATING ORAL at 20:26

## 2017-08-25 RX ADMIN — BACLOFEN 10 MG: 10 TABLET ORAL at 09:21

## 2017-08-25 RX ADMIN — Medication 2 TABLET: at 20:26

## 2017-08-25 RX ADMIN — BACLOFEN 10 MG: 10 TABLET ORAL at 20:26

## 2017-08-25 RX ADMIN — LORAZEPAM 1 MG: 1 TABLET ORAL at 20:26

## 2017-08-25 ASSESSMENT — ACTIVITIES OF DAILY LIVING (ADL)
SHOWER_TRANSFER_LEVEL_OF_ASSIST: ABLE TO COMPLETE SHOWER TRANSFER WITHOUT ASSIST
TOILET_TRANSFER_LEVEL_OF_ASSIST: ABLE TO COMPLETE TOILET TRANSFER WITHOUT ASSIST
TOILETING_LEVEL_OF_ASSIST: ABLE TO COMPLETE TOILETING WITHOUT ASSIST

## 2017-08-25 ASSESSMENT — PAIN SCALES - GENERAL
PAINLEVEL_OUTOF10: 2
PAINLEVEL_OUTOF10: 5

## 2017-08-25 NOTE — CARE PLAN
Problem: Safety  Goal: Will remain free from falls  Outcome: PROGRESSING AS EXPECTED  Pt demonstrates appropriate safety techniques throughout this shift. Patient uses call light to ask for assistance and does not attempt unsafe transfers.    Problem: Infection  Goal: Will remain free from infection  Outcome: PROGRESSING AS EXPECTED  Pt shows no signs of new or worsening infection. Pt is afebrile at this time. Will continue to monitor.

## 2017-08-25 NOTE — PROGRESS NOTES
Received bedside report; assumed pt care. Pt A&O x4, calm, and stable. Pt is not complaining of pain or discomfort. Pt sitting comfortably in wheelchair, call light within reach when in room, safety precautions in place. Will continue to monitor.

## 2017-08-25 NOTE — CARE PLAN
Problem: Mobility Transfers  Goal: STG-Within one week, patient will transfer bed to chair  1) Individualized goal: Patient will transfer mod I wc <> bed/ mod I bed mobility, STG = LTG  2) Interventions: PT E Stim Attended, PT Orthotics Training, PT Gait Training, PT Self Care/Home Eval, PT Therapeutic Exercises, PT Neuro Re-Ed/Balance, PT Aquatic Therapy, PT Therapeutic Activity, PT Manual Therapy and PT Evaluation.  Outcome: MET Date Met:  08/25/17    Problem: PT-Long Term Goals  Goal: LTG-By discharge, patient will transfer one surface to another  1) Individualized goal: Patient will transfer mod I wc <> bed/ mod I bed mobility   2) Interventions: PT E Stim Attended, PT Orthotics Training, PT Gait Training, PT Self Care/Home Eval, PT Therapeutic Exercises, PT Neuro Re-Ed/Balance, PT Aquatic Therapy, PT Therapeutic Activity, PT Manual Therapy and PT Evaluation.  Outcome: MET Date Met:  08/25/17  Goal: LTG-By discharge, patient will propel wheelchair  1) Individualized goal: Patient will propel wc mod I indoors and outdoors over various inclines and surfaces >300 ft.  2) Interventions: PT E Stim Attended, PT Orthotics Training, PT Gait Training, PT Self Care/Home Eval, PT Therapeutic Exercises, PT Neuro Re-Ed/Balance, PT Aquatic Therapy, PT Therapeutic Activity, PT Manual Therapy and PT Evaluation.  Outcome: MET Date Met:  08/25/17
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
## 2017-08-25 NOTE — DISCHARGE PLANNING
08/25/17  1336   Discharge Instructions - Completed by Case Mgmt     Discharge Location   Home with Home Health     Agency Name / Address / Phone   [facility] at [phone] (they will call you to schedule visits)     Home Health   Registered Nurse; Occupational Therapist; Physical Therapist     Outpatient Services   AT/IL program for home modifications, Bj at [phone]     Medical equipment Provider / Phone   [employer] at [phone]       Medical Equipment Ordered   Wheelchair modifications, cushion and slideboard       Comments       I have requested eval [facility] program (Division of Aging) for home assistance, they will call you to complete assessment. [phone] (my contact was Anaya Wyatt)       Follow-up With Details Why Contact Annmarie Ortiz M.D. (Primary Care) On 8/31/2017 Thursday at 10:40 am (you will be seen by Kaylan ORTIZ)(records will be sent) [address]  [place] [id number]  [phone]

## 2017-08-25 NOTE — CARE PLAN
Problem: Bathing  Goal: STG-Within one week, patient will bathe  1) Individualized Goal: With mod I using AE and DME prn  2) Interventions: OT Orthotics Training, OT E Stim Attended, OT Self Care/ADL, OT Cognitive Skill Dev, OT Community Reintegration, OT Manual Ther Technique, OT Neuro Re-Ed/Balance, OT Sensory Int Techniques, OT Therapeutic Activity, OT Evaluation and OT Therapeutic Exercise  Outcome: MET Date Met:  08/25/17    Problem: Functional Transfers  Goal: STG-Within one week, patient will transfer to tub/shower  1) Individualized Goal: With modified independence using DME as needed.   2) Interventions: OT Self Care/ADL, OT Neuro Re-Ed/Balance, OT Therapeutic Activity and OT Therapeutic Exercise  Outcome: MET Date Met:  08/25/17    Problem: OT Long Term Goals  Goal: LTG-By discharge, patient will complete basic self care tasks  1) Individualized Goal: modified independent at wheelchair level  2) Interventions: OT Self Care/ADL and OT Neuro Re-Ed/Balance  Outcome: MET Date Met:  08/25/17  Goal: LTG-By discharge, patient will perform bathroom transfers  1) Individualized Goal: modified independent wheelchair level  2) Interventions: OT Self Care/ADL and OT Neuro Re-Ed/Balance  Outcome: MET Date Met:  08/25/17  Goal: LTG-By discharge, patient will complete basic home management  1) Individualized Goal: Modified independent at a wheelchair level.  2) Interventions: OT Self Care/ADL, OT Community Reintegration, OT Neuro Re-Ed/Balance, OT Therapeutic Activity and OT Therapeutic Exercise  Outcome: MET Date Met:  08/25/17

## 2017-08-25 NOTE — CARE PLAN
Problem: Safety  Goal: Will remain free from falls  Intervention: Implement fall precautions  Pt educated on use of call light for assistance. Pt able to verbalize concerns and understands teaching.       Problem: Pain Management  Goal: Pain level will decrease to patient’s comfort goal  Intervention: Follow pain managment plan developed in collaboration with patient and Interdisciplinary Team  Pt denies pain for the evening. Pt encouraged to notify hcp if he does get pain. Pt verbalize understanding.

## 2017-08-25 NOTE — DISCHARGE PLANNING
Case Management;  Met with patient and reviewed d/c plans for tomorrow.  Scheduled [facility] w/c cab for 1:00 pm  tomorrow. Gave patient a cab voucher for this. Patient has had home modifications with AT/IL program.  [facility] ready to follow.  Patient has his own w/c which has been modified by [employer].  He also has cushion and slide board which will be provided by them.  He has AFO's from [facility]jimenez.  I have completed referral to Division of Aging for PAS program and they will call patient to complete this assessment.  Patient is agreeable with all plans.  Staff and physician updated.

## 2017-08-26 VITALS
WEIGHT: 185.19 LBS | HEART RATE: 74 BPM | HEIGHT: 76 IN | OXYGEN SATURATION: 98 % | BODY MASS INDEX: 22.55 KG/M2 | TEMPERATURE: 98.2 F | RESPIRATION RATE: 18 BRPM | SYSTOLIC BLOOD PRESSURE: 114 MMHG | DIASTOLIC BLOOD PRESSURE: 64 MMHG

## 2017-08-26 PROCEDURE — A9270 NON-COVERED ITEM OR SERVICE: HCPCS | Performed by: PHYSICAL MEDICINE & REHABILITATION

## 2017-08-26 PROCEDURE — 700102 HCHG RX REV CODE 250 W/ 637 OVERRIDE(OP): Performed by: PHYSICAL MEDICINE & REHABILITATION

## 2017-08-26 RX ORDER — MIRTAZAPINE 15 MG/1
TABLET, ORALLY DISINTEGRATING ORAL
Status: DISCONTINUED
Start: 2017-08-26 | End: 2017-08-26 | Stop reason: HOSPADM

## 2017-08-26 RX ADMIN — BACLOFEN 10 MG: 10 TABLET ORAL at 10:26

## 2017-08-26 ASSESSMENT — PAIN SCALES - GENERAL: PAINLEVEL_OUTOF10: 1

## 2017-08-26 NOTE — PROGRESS NOTES
Received bedside report; assumed pt care. Pt A/O x 4, calm, and stable. Pt is not complaining of pain or discomfort. Pt resting comfortably in bed, call light within reach when in room, safety precautions in place. Will continue to monitor.

## 2017-08-26 NOTE — DISCHARGE INSTRUCTIONS
[facility] NURSING DISCHARGE INSTRUCTIONS    Blood Pressure : 114/64  Weight: 84 kg (185 lb 3 oz)  Nursing recommendations for Bernardino Hawthorne at time of discharge are as follows:  Cleient verbalized understanding of all discharge instructions and prescriptions.     Review all your home medications and newly ordered medications with your doctor and/or pharmacist. Follow medication instructions as directed by your doctor and/or pharmacist.    Pain Management:   Discharge Pain Medication Instructions:  Comfort Goal: 0, 1, Comfort at Rest, Comfort with Movement, Sleep Comfortably  Notify your primary care provider if pain is unrelieved with these measures, if the pain is new, or increased in intensity.    Discharge Skin Characteristics:    Warm, dry and intact       Skin / Wound Care Instructions: Please contact your primary care physician for any change in skin integrity.                   Discharge Diet:   Regular    Discharge Liquids:  Thin    Discharge Bowel Function:    Please contact your primary care physician for any changes in bowel habits.       Discharge Urinary Devices: Urinal    Nursing Discharge Plan:   Influenza Vaccine Indication: Patient Refuses    Case Management Discharge Instructions:   Discharge Location: Home with Home Health  Agency Name/Address/Phone: [facility] at [phone] (they will call you to schedule visits)  Home Health: Registered Nurse, Occupational Therapist, Physical Therapist  Outpatient Services:    DME Provider/Phone: Kaila Hernandez at [phone]  Medical Equipment Ordered: Other   Prescription Faxed to:        Discharge Medication Instructions:  Below are the medications your physician expects you to take upon discharge:        Fall Prevention in the Home   Falls can cause injuries. They can happen to people of all ages. There are many things you can do to make your home safe and to help prevent falls.   WHAT CAN I DO ON THE OUTSIDE OF MY  HOME?  · Regularly fix the edges of walkways and driveways and fix any cracks.  · Remove anything that might make you trip as you walk through a door, such as a raised step or threshold.  · Trim any bushes or trees on the path to your home.  · Use bright outdoor lighting.  · Clear any walking paths of anything that might make someone trip, such as rocks or tools.  · Regularly check to see if handrails are loose or broken. Make sure that both sides of any steps have handrails.  · Any raised decks and porches should have guardrails on the edges.  · Have any leaves, snow, or ice cleared regularly.  · Use sand or salt on walking paths during winter.  · Clean up any spills in your garage right away. This includes oil or grease spills.  WHAT CAN I DO IN THE BATHROOM?   · Use night lights.  · Install grab bars by the toilet and in the tub and shower. Do not use towel bars as grab bars.  · Use non-skid mats or decals in the tub or shower.  · If you need to sit down in the shower, use a plastic, non-slip stool.  · Keep the floor dry. Clean up any water that spills on the floor as soon as it happens.  · Remove soap buildup in the tub or shower regularly.  · Attach bath mats securely with double-sided non-slip rug tape.  · Do not have throw rugs and other things on the floor that can make you trip.  WHAT CAN I DO IN THE BEDROOM?  · Use night lights.  · Make sure that you have a light by your bed that is easy to reach.  · Do not use any sheets or blankets that are too big for your bed. They should not hang down onto the floor.  · Have a firm chair that has side arms. You can use this for support while you get dressed.  · Do not have throw rugs and other things on the floor that can make you trip.  WHAT CAN I DO IN THE KITCHEN?  · Clean up any spills right away.  · Avoid walking on wet floors.  · Keep items that you use a lot in easy-to-reach places.  · If you need to reach something above you, use a strong step stool that has a  grab bar.  · Keep electrical cords out of the way.  · Do not use floor polish or wax that makes floors slippery. If you must use wax, use non-skid floor wax.  · Do not have throw rugs and other things on the floor that can make you trip.  WHAT CAN I DO WITH MY STAIRS?  · Do not leave any items on the stairs.  · Make sure that there are handrails on both sides of the stairs and use them. Fix handrails that are broken or loose. Make sure that handrails are as long as the stairways.  · Check any carpeting to make sure that it is firmly attached to the stairs. Fix any carpet that is loose or worn.  · Avoid having throw rugs at the top or bottom of the stairs. If you do have throw rugs, attach them to the floor with carpet tape.  · Make sure that you have a light switch at the top of the stairs and the bottom of the stairs. If you do not have them, ask someone to add them for you.  WHAT ELSE CAN I DO TO HELP PREVENT FALLS?  · Wear shoes that:  ¨ Do not have high heels.  ¨ Have rubber bottoms.  ¨ Are comfortable and fit you well.  ¨ Are closed at the toe. Do not wear sandals.  · If you use a stepladder:  ¨ Make sure that it is fully opened. Do not climb a closed stepladder.  ¨ Make sure that both sides of the stepladder are locked into place.  ¨ Ask someone to hold it for you, if possible.  · Clearly saravanan and make sure that you can see:  ¨ Any grab bars or handrails.  ¨ First and last steps.  ¨ Where the edge of each step is.  · Use tools that help you move around (mobility aids) if they are needed. These include:  ¨ Canes.  ¨ Walkers.  ¨ Scooters.  ¨ Crutches.  · Turn on the lights when you go into a dark area. Replace any light bulbs as soon as they burn out.  · Set up your furniture so you have a clear path. Avoid moving your furniture around.  · If any of your floors are uneven, fix them.  · If there are any pets around you, be aware of where they are.  · Review your medicines with your doctor. Some medicines can make  you feel dizzy. This can increase your chance of falling.  Ask your doctor what other things that you can do to help prevent falls.     This information is not intended to replace advice given to you by your health care provider. Make sure you discuss any questions you have with your health care provider.     Document Released: 10/14/2010 Document Revised: 05/03/2016 Document Reviewed: 01/22/2016  [employer] Interactive Patient Education ©2016 [employer]    Physical Therapy Discharge Instructions for Bernardino Hawthorne    8/25/2017    Level of Assist Required to Propel Wheelchair: Requires No Assist  Level of Assist Required for Transfers: Requires No Assist  Device Recommended for Transfers:  (Slideboard as needed)  Home Exercise Program: Refer to Home Exercise Program Handout for Details    It was great working with you Bernardino!  Take care and best wishes for the future!  Hilda Guaman PT DPT    Occupational Therapy Discharge Instructions for Bernardino Hawthorne    8/25/2017    Level of Assist Required for Eating: Able to Complete Eating without Assist  Level of Assist Required for Grooming: Able to Complete Grooming without Assist  Level of Assist Required for Dressing: Able to Complete Dressing without Assist  Level of Assist Required for Toileting: Able to Complete Toileting without Assist  Level of Assist Required for Toilet Transfer: Able to Complete Toilet Transfer without Assist  Equipment for Toilet Transfer: Raised Toilet Seat without Arms, Grab Bars by Toilet  Level of Assist Required for Bathing: Able to Complete Bathing without Assist  Equipment for Bathing: Tub Transfer Bench, Grab Bars in Tub / Shower, Hand Held Shower Head  Level of Assist Required for Shower Transfer: Able to Complete Shower Transfer without Assist  Equipment for Shower Transfer: Tub Transfer Bench, Grab Bars in Tub / Shower  Level of Assist Required for Home Mgmt: Able to Complete Home Management without Assist  Level of Assist Required for Meal  Prep: Able to Complete Meal Preparation without Assist  Driving: Please Contact Physician Prior to Driving  Home Exercise Program: None Issued    It was great working with you, Bernardino! Best wishes for you at home!   -Claudia Paris, EDMUNDR/L, Sparkle Szymanski, OTS

## 2017-08-26 NOTE — CARE PLAN
Problem: Safety  Goal: Will remain free from injury  Outcome: PROGRESSING AS EXPECTED  Pt. Uses call light within reach and waits for assistance, good safety awareness noted but continue hourly rounding for safety.    Problem: Pain Management  Goal: Pain level will decrease to patient’s comfort goal  Outcome: PROGRESSING AS EXPECTED  Pt. Resting comfortably at this time, sleeping well. Denies pain when asked earlier. No complain made, pt. Is happy to be discharge tomorrow. Continue monitor condition.

## 2017-08-26 NOTE — PROGRESS NOTES
Patient discharged to home per order.  Reviewed all discharge instructions, appointments, discharge medications and wound care instructions with patient and verbalize understanding.  Discharge paperwork completed, signed copies in chart.  Patient has Rehab educational binder and all belongings, signed copies in chart.  Patient alert, calm, stable: no change in status from morning assessment.  Patient left facitlity at 1300 via a cab .  Have enjoyed working with this pleasant patient.
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
## 2017-08-29 NOTE — PROGRESS NOTES
DATE OF SERVICE:  08/19/2017    SUBJECTIVE:  Patient without complaints.    OBJECTIVE:  VITAL SIGNS:  Stable and he is afebrile.  HEENT:  Atraumatic, conjunctivae anicteric.  Nose patent.  Mouth, moist   membranes.  NECK:  Supple.  No lymphadenopathy.  LUNGS:  Clear to auscultation bilaterally.  HEART:  Regular rate.  ABDOMEN:  Positive bowel sounds, soft and nontender.   AND RECTAL:  Deferred.  EXTREMITIES:  No clubbing, cyanosis.  Incision is clean, dry, and intact.    ASSESSMENT AND PLAN:  1.  Thoracic spinal cord injury.  Continue comprehensive rehabilitative   program.  2.  Continue to work on his bowel and bladder program with comprehensive team   approach and nursing education and skin precautions.  We are going to continue   this as well as incentive spirometer, work on his pulmonary status as well.    Pain control is still an issue, but this is improving, seems considerably and   is participating with therapies good and he is showing good progress there.       ____________________________________     M MD STEVE CONTRERAS / ABBI    DD:  08/28/2017 18:13:48  DT:  08/28/2017 23:42:34    D#:  [id number]  Job#:  [id number]
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
## 2017-09-10 NOTE — CARE PLAN
Problem: Safety  Goal: Will remain free from falls  Intervention: Implement fall precautions  Room/floor clear. Non skid socks on. Proper signs up. Bed alarm on, bed low and locked. Call light and belonging within reach. Hourly rounding in place to make sure needs are met.       Problem: Infection  Goal: Will remain free from infection  Intervention: Implement standard precautions and perform hand washing before and after patient contact  Hand washing every encounter. IV ports scrubbed with alcohol when hanging medicine. Patient watch for s/s of infection. Patient taught to report s/s of infection, verbalized understanding.       Problem: Pain Management  Goal: Pain level will decrease to patient’s comfort goal  Intervention: Follow pain managment plan developed in collaboration with patient and Interdisciplinary Team  Pain assessment Q4H or Q2H after medication intervention. Encouraged patient to report pain, verbalized understanding. Medicated PRN.       Problem: Respiratory:  Goal: Respiratory status will improve  Intervention: Educate and encourage coughing and deep breathing  Encouraged to perform coughing and deep breathing, verbalized understanding.       Problem: Skin Integrity  Goal: Risk for impaired skin integrity will decrease  Intervention: Implement precautions to protect skin integrity in collaboration with the interdisciplinary team  Frequently turns, pillows in use, and cream used to protect skin.            20

## 2017-10-27 NOTE — ADDENDUM NOTE
Encounter addended by: Alie Fontenot on: 10/27/2017  2:09 PM<BR>    Actions taken: Flowsheet accepted

## 2019-05-09 ENCOUNTER — HOSPITAL ENCOUNTER (OUTPATIENT)
Dept: LAB | Facility: MEDICAL CENTER | Age: 69
End: 2019-05-09
Attending: FAMILY MEDICINE
Payer: MEDICARE

## 2019-05-09 LAB
BASOPHILS # BLD AUTO: 0.9 % (ref 0–1.8)
BASOPHILS # BLD: 0.04 K/UL (ref 0–0.12)
EOSINOPHIL # BLD AUTO: 0.29 K/UL (ref 0–0.51)
EOSINOPHIL NFR BLD: 6.6 % (ref 0–6.9)
ERYTHROCYTE [DISTWIDTH] IN BLOOD BY AUTOMATED COUNT: 50.1 FL (ref 35.9–50)
HCT VFR BLD AUTO: 48.4 % (ref 42–52)
HGB BLD-MCNC: 16.5 G/DL (ref 14–18)
IMM GRANULOCYTES # BLD AUTO: 0.01 K/UL (ref 0–0.11)
IMM GRANULOCYTES NFR BLD AUTO: 0.2 % (ref 0–0.9)
LYMPHOCYTES # BLD AUTO: 0.97 K/UL (ref 1–4.8)
LYMPHOCYTES NFR BLD: 22.1 % (ref 22–41)
MCH RBC QN AUTO: 33.9 PG (ref 27–33)
MCHC RBC AUTO-ENTMCNC: 34.1 G/DL (ref 33.7–35.3)
MCV RBC AUTO: 99.4 FL (ref 81.4–97.8)
MONOCYTES # BLD AUTO: 0.35 K/UL (ref 0–0.85)
MONOCYTES NFR BLD AUTO: 8 % (ref 0–13.4)
NEUTROPHILS # BLD AUTO: 2.72 K/UL (ref 1.82–7.42)
NEUTROPHILS NFR BLD: 62.2 % (ref 44–72)
NRBC # BLD AUTO: 0 K/UL
NRBC BLD-RTO: 0 /100 WBC
PLATELET # BLD AUTO: 170 K/UL (ref 164–446)
PMV BLD AUTO: 9.8 FL (ref 9–12.9)
RBC # BLD AUTO: 4.87 M/UL (ref 4.7–6.1)
WBC # BLD AUTO: 4.4 K/UL (ref 4.8–10.8)

## 2019-05-09 PROCEDURE — 80053 COMPREHEN METABOLIC PANEL: CPT

## 2019-05-09 PROCEDURE — 36415 COLL VENOUS BLD VENIPUNCTURE: CPT

## 2019-05-09 PROCEDURE — 85025 COMPLETE CBC W/AUTO DIFF WBC: CPT

## 2019-05-09 PROCEDURE — 84443 ASSAY THYROID STIM HORMONE: CPT

## 2019-05-10 LAB
ALBUMIN SERPL BCP-MCNC: 4 G/DL (ref 3.2–4.9)
ALBUMIN/GLOB SERPL: 1.2 G/DL
ALP SERPL-CCNC: 58 U/L (ref 30–99)
ALT SERPL-CCNC: 15 U/L (ref 2–50)
ANION GAP SERPL CALC-SCNC: 12 MMOL/L (ref 0–11.9)
AST SERPL-CCNC: 28 U/L (ref 12–45)
BILIRUB SERPL-MCNC: 0.7 MG/DL (ref 0.1–1.5)
BUN SERPL-MCNC: 5 MG/DL (ref 8–22)
CALCIUM SERPL-MCNC: 9 MG/DL (ref 8.5–10.5)
CHLORIDE SERPL-SCNC: 107 MMOL/L (ref 96–112)
CO2 SERPL-SCNC: 22 MMOL/L (ref 20–33)
CREAT SERPL-MCNC: 0.8 MG/DL (ref 0.5–1.4)
FASTING STATUS PATIENT QL REPORTED: NORMAL
GLOBULIN SER CALC-MCNC: 3.3 G/DL (ref 1.9–3.5)
GLUCOSE SERPL-MCNC: 81 MG/DL (ref 65–99)
POTASSIUM SERPL-SCNC: 3.9 MMOL/L (ref 3.6–5.5)
PROT SERPL-MCNC: 7.3 G/DL (ref 6–8.2)
SODIUM SERPL-SCNC: 141 MMOL/L (ref 135–145)
TSH SERPL DL<=0.005 MIU/L-ACNC: 2.17 UIU/ML (ref 0.38–5.33)

## 2019-09-05 ENCOUNTER — APPOINTMENT (OUTPATIENT)
Dept: RADIOLOGY | Facility: MEDICAL CENTER | Age: 69
End: 2019-09-05
Attending: EMERGENCY MEDICINE
Payer: MEDICARE

## 2019-09-05 ENCOUNTER — HOSPITAL ENCOUNTER (EMERGENCY)
Facility: MEDICAL CENTER | Age: 69
End: 2019-09-05
Attending: EMERGENCY MEDICINE
Payer: MEDICARE

## 2019-09-05 VITALS
HEART RATE: 82 BPM | OXYGEN SATURATION: 95 % | RESPIRATION RATE: 16 BRPM | HEIGHT: 76 IN | SYSTOLIC BLOOD PRESSURE: 127 MMHG | DIASTOLIC BLOOD PRESSURE: 88 MMHG | BODY MASS INDEX: 24.36 KG/M2 | WEIGHT: 200 LBS | TEMPERATURE: 98.6 F

## 2019-09-05 DIAGNOSIS — S51.811A SKIN TEAR OF RIGHT FOREARM WITHOUT COMPLICATION, INITIAL ENCOUNTER: ICD-10-CM

## 2019-09-05 DIAGNOSIS — S93.401A SPRAIN OF RIGHT ANKLE, UNSPECIFIED LIGAMENT, INITIAL ENCOUNTER: ICD-10-CM

## 2019-09-05 DIAGNOSIS — S82.831A CLOSED TRAUMATIC NONDISPLACED FRACTURE OF DISTAL END OF RIGHT FIBULA, INITIAL ENCOUNTER: ICD-10-CM
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
## 2019-09-05 DIAGNOSIS — Z86.69 HISTORY OF PARAPLEGIA: ICD-10-CM

## 2019-09-05 PROCEDURE — 99284 EMERGENCY DEPT VISIT MOD MDM: CPT

## 2019-09-05 PROCEDURE — 73610 X-RAY EXAM OF ANKLE: CPT | Mod: RT

## 2019-09-05 ASSESSMENT — LIFESTYLE VARIABLES: DO YOU DRINK ALCOHOL: NO

## 2019-09-05 NOTE — DISCHARGE INSTRUCTIONS
follow-up with primary care 1 to 2 days for reevaluation, medication management referral to orthopedics if pain persists.  Follow-up with orthopedics next week for reevaluation.    Continue any home medications as previously indicated.  Tylenol or ibuprofen as needed for discomfort.    Keep wounds clean, dry and intact.  Cleanse gently with warm water, soap, pat dry.  Antibiotic ointment twice daily.  Keep covered while outdoors or active.    Return to the emergency department for wound infection, persistent or worsening ankle pain/swelling/discoloration, or other new concerns.
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
## 2019-09-06 NOTE — ED NOTES
Discharge instructions given.  All questions answered.  Pt to follow-up with ortho, return to ER if symptoms worsen as discussed.  Pt verbalized understanding.  All belongings with pt.  Pt to lobby via personal w/c.

## 2020-08-05 ENCOUNTER — HOSPITAL ENCOUNTER (INPATIENT)
Facility: MEDICAL CENTER | Age: 70
LOS: 1 days | DRG: 603 | End: 2020-08-08
Attending: EMERGENCY MEDICINE | Admitting: HOSPITALIST
Payer: MEDICARE

## 2020-08-05 ENCOUNTER — APPOINTMENT (OUTPATIENT)
Dept: RADIOLOGY | Facility: MEDICAL CENTER | Age: 70
DRG: 603 | End: 2020-08-05
Attending: EMERGENCY MEDICINE
Payer: MEDICARE

## 2020-08-05 DIAGNOSIS — L03.115 CELLULITIS OF RIGHT LEG: ICD-10-CM

## 2020-08-05 DIAGNOSIS — L03.115 CELLULITIS OF RIGHT LOWER EXTREMITY: ICD-10-CM

## 2020-08-05 DIAGNOSIS — R30.0 DYSURIA: ICD-10-CM

## 2020-08-05 DIAGNOSIS — S91.311A LACERATION OF RIGHT FOOT, INITIAL ENCOUNTER: ICD-10-CM

## 2020-08-05 PROBLEM — E87.20 LACTIC ACIDOSIS: Status: ACTIVE | Noted: 2020-08-05

## 2020-08-05 PROBLEM — L03.90 CELLULITIS: Status: ACTIVE | Noted: 2020-08-05

## 2020-08-05 PROBLEM — E86.0 DEHYDRATION: Status: ACTIVE | Noted: 2020-08-05

## 2020-08-05 PROBLEM — F41.9 ANXIETY: Status: ACTIVE | Noted: 2020-08-05

## 2020-08-05 PROBLEM — R74.8 ELEVATED LIVER ENZYMES: Status: ACTIVE | Noted: 2020-08-05

## 2020-08-05 PROBLEM — E87.6 HYPOKALEMIA: Status: ACTIVE | Noted: 2020-08-05

## 2020-08-05 LAB
ALBUMIN SERPL BCP-MCNC: 3.4 G/DL (ref 3.2–4.9)
ALBUMIN/GLOB SERPL: 0.8 G/DL
ALP SERPL-CCNC: 49 U/L (ref 30–99)
ALT SERPL-CCNC: 61 U/L (ref 2–50)
ANION GAP SERPL CALC-SCNC: 18 MMOL/L (ref 7–16)
APPEARANCE UR: CLEAR
AST SERPL-CCNC: 95 U/L (ref 12–45)
BASOPHILS # BLD AUTO: 0.3 % (ref 0–1.8)
BASOPHILS # BLD: 0.02 K/UL (ref 0–0.12)
BILIRUB SERPL-MCNC: 1.1 MG/DL (ref 0.1–1.5)
BILIRUB UR QL STRIP.AUTO: ABNORMAL
BUN SERPL-MCNC: 12 MG/DL (ref 8–22)
CALCIUM SERPL-MCNC: 8.8 MG/DL (ref 8.4–10.2)
CHLORIDE SERPL-SCNC: 95 MMOL/L (ref 96–112)
CO2 SERPL-SCNC: 21 MMOL/L (ref 20–33)
COLOR UR: YELLOW
COVID ORDER STATUS COVID19: NORMAL
CREAT SERPL-MCNC: 0.72 MG/DL (ref 0.5–1.4)
EOSINOPHIL # BLD AUTO: 0.03 K/UL (ref 0–0.51)
EOSINOPHIL NFR BLD: 0.5 % (ref 0–6.9)
ERYTHROCYTE [DISTWIDTH] IN BLOOD BY AUTOMATED COUNT: 43.6 FL (ref 35.9–50)
GLOBULIN SER CALC-MCNC: 4.1 G/DL (ref 1.9–3.5)
GLUCOSE SERPL-MCNC: 95 MG/DL (ref 65–99)
GLUCOSE UR STRIP.AUTO-MCNC: NEGATIVE MG/DL
HAV IGM SERPL QL IA: NORMAL
HBV CORE IGM SER QL: NORMAL
HBV SURFACE AG SER QL: NORMAL
HCT VFR BLD AUTO: 45.9 % (ref 42–52)
HCV AB SER QL: NORMAL
HGB BLD-MCNC: 15.9 G/DL (ref 14–18)
IMM GRANULOCYTES # BLD AUTO: 0.03 K/UL (ref 0–0.11)
IMM GRANULOCYTES NFR BLD AUTO: 0.5 % (ref 0–0.9)
KETONES UR STRIP.AUTO-MCNC: 40 MG/DL
LACTATE BLD-SCNC: 1.7 MMOL/L (ref 0.5–2)
LACTATE BLD-SCNC: 1.7 MMOL/L (ref 0.5–2)
LACTATE BLD-SCNC: 2.9 MMOL/L (ref 0.5–2)
LEUKOCYTE ESTERASE UR QL STRIP.AUTO: NEGATIVE
LYMPHOCYTES # BLD AUTO: 0.6 K/UL (ref 1–4.8)
LYMPHOCYTES NFR BLD: 9.2 % (ref 22–41)
MCH RBC QN AUTO: 32.6 PG (ref 27–33)
MCHC RBC AUTO-ENTMCNC: 34.6 G/DL (ref 33.7–35.3)
MCV RBC AUTO: 94.3 FL (ref 81.4–97.8)
MICRO URNS: ABNORMAL
MONOCYTES # BLD AUTO: 0.55 K/UL (ref 0–0.85)
MONOCYTES NFR BLD AUTO: 8.5 % (ref 0–13.4)
NEUTROPHILS # BLD AUTO: 5.26 K/UL (ref 1.82–7.42)
NEUTROPHILS NFR BLD: 81 % (ref 44–72)
NITRITE UR QL STRIP.AUTO: NEGATIVE
NRBC # BLD AUTO: 0 K/UL
NRBC BLD-RTO: 0 /100 WBC
PH UR STRIP.AUTO: 7 [PH] (ref 5–8)
PLATELET # BLD AUTO: 148 K/UL (ref 164–446)
PMV BLD AUTO: 10.1 FL (ref 9–12.9)
POTASSIUM SERPL-SCNC: 3.4 MMOL/L (ref 3.6–5.5)
PROT SERPL-MCNC: 7.5 G/DL (ref 6–8.2)
PROT UR QL STRIP: NEGATIVE MG/DL
RBC # BLD AUTO: 4.87 M/UL (ref 4.7–6.1)
RBC UR QL AUTO: NEGATIVE
SODIUM SERPL-SCNC: 134 MMOL/L (ref 135–145)
SP GR UR STRIP.AUTO: 1.01
WBC # BLD AUTO: 6.5 K/UL (ref 4.8–10.8)

## 2020-08-05 PROCEDURE — 700105 HCHG RX REV CODE 258: Performed by: HOSPITALIST

## 2020-08-05 PROCEDURE — 73630 X-RAY EXAM OF FOOT: CPT | Mod: RT

## 2020-08-05 PROCEDURE — 80053 COMPREHEN METABOLIC PANEL: CPT

## 2020-08-05 PROCEDURE — U0003 INFECTIOUS AGENT DETECTION BY NUCLEIC ACID (DNA OR RNA); SEVERE ACUTE RESPIRATORY SYNDROME CORONAVIRUS 2 (SARS-COV-2) (CORONAVIRUS DISEASE [COVID-19]), AMPLIFIED PROBE TECHNIQUE, MAKING USE OF HIGH THROUGHPUT TECHNOLOGIES AS DESCRIBED BY CMS-2020-01-R: HCPCS

## 2020-08-05 PROCEDURE — G0378 HOSPITAL OBSERVATION PER HR: HCPCS

## 2020-08-05 PROCEDURE — 80074 ACUTE HEPATITIS PANEL: CPT

## 2020-08-05 PROCEDURE — 87040 BLOOD CULTURE FOR BACTERIA: CPT

## 2020-08-05 PROCEDURE — 83605 ASSAY OF LACTIC ACID: CPT | Mod: 91

## 2020-08-05 PROCEDURE — 90715 TDAP VACCINE 7 YRS/> IM: CPT | Performed by: EMERGENCY MEDICINE

## 2020-08-05 PROCEDURE — 99218 PR INITIAL OBSERVATION CARE,LEVL I: CPT | Performed by: HOSPITALIST

## 2020-08-05 PROCEDURE — 73590 X-RAY EXAM OF LOWER LEG: CPT | Mod: RT

## 2020-08-05 PROCEDURE — 87086 URINE CULTURE/COLONY COUNT: CPT

## 2020-08-05 PROCEDURE — A9270 NON-COVERED ITEM OR SERVICE: HCPCS | Performed by: HOSPITALIST

## 2020-08-05 PROCEDURE — 700111 HCHG RX REV CODE 636 W/ 250 OVERRIDE (IP): Performed by: EMERGENCY MEDICINE

## 2020-08-05 PROCEDURE — 90471 IMMUNIZATION ADMIN: CPT

## 2020-08-05 PROCEDURE — 36415 COLL VENOUS BLD VENIPUNCTURE: CPT

## 2020-08-05 PROCEDURE — 99285 EMERGENCY DEPT VISIT HI MDM: CPT

## 2020-08-05 PROCEDURE — 85025 COMPLETE CBC W/AUTO DIFF WBC: CPT

## 2020-08-05 PROCEDURE — 81003 URINALYSIS AUTO W/O SCOPE: CPT

## 2020-08-05 PROCEDURE — 96365 THER/PROPH/DIAG IV INF INIT: CPT

## 2020-08-05 PROCEDURE — 700102 HCHG RX REV CODE 250 W/ 637 OVERRIDE(OP): Performed by: HOSPITALIST

## 2020-08-05 PROCEDURE — 71045 X-RAY EXAM CHEST 1 VIEW: CPT

## 2020-08-05 PROCEDURE — C9803 HOPD COVID-19 SPEC COLLECT: HCPCS | Performed by: EMERGENCY MEDICINE

## 2020-08-05 RX ORDER — MORPHINE SULFATE 4 MG/ML
2-4 INJECTION, SOLUTION INTRAMUSCULAR; INTRAVENOUS
Status: DISCONTINUED | OUTPATIENT
Start: 2020-08-05 | End: 2020-08-08 | Stop reason: HOSPADM

## 2020-08-05 RX ORDER — POLYETHYLENE GLYCOL 3350 17 G/17G
1 POWDER, FOR SOLUTION ORAL
Status: DISCONTINUED | OUTPATIENT
Start: 2020-08-05 | End: 2020-08-08 | Stop reason: HOSPADM

## 2020-08-05 RX ORDER — LORAZEPAM 1 MG/1
2 TABLET ORAL
Status: DISCONTINUED | OUTPATIENT
Start: 2020-08-05 | End: 2020-08-08 | Stop reason: HOSPADM

## 2020-08-05 RX ORDER — MIRTAZAPINE 15 MG/1
15 TABLET, FILM COATED ORAL NIGHTLY
Status: ON HOLD | COMMUNITY
End: 2022-06-05

## 2020-08-05 RX ORDER — ONDANSETRON 2 MG/ML
4 INJECTION INTRAMUSCULAR; INTRAVENOUS EVERY 4 HOURS PRN
Status: DISCONTINUED | OUTPATIENT
Start: 2020-08-05 | End: 2020-08-08 | Stop reason: HOSPADM

## 2020-08-05 RX ORDER — LEVOFLOXACIN 5 MG/ML
750 INJECTION, SOLUTION INTRAVENOUS ONCE
Status: COMPLETED | OUTPATIENT
Start: 2020-08-05 | End: 2020-08-05

## 2020-08-05 RX ORDER — AMOXICILLIN 250 MG
2 CAPSULE ORAL 2 TIMES DAILY
Status: DISCONTINUED | OUTPATIENT
Start: 2020-08-05 | End: 2020-08-08 | Stop reason: HOSPADM

## 2020-08-05 RX ORDER — ONDANSETRON 4 MG/1
4 TABLET, ORALLY DISINTEGRATING ORAL EVERY 4 HOURS PRN
Status: DISCONTINUED | OUTPATIENT
Start: 2020-08-05 | End: 2020-08-08 | Stop reason: HOSPADM

## 2020-08-05 RX ORDER — SODIUM CHLORIDE 9 MG/ML
INJECTION, SOLUTION INTRAVENOUS CONTINUOUS
Status: DISCONTINUED | OUTPATIENT
Start: 2020-08-05 | End: 2020-08-07

## 2020-08-05 RX ORDER — BACLOFEN 10 MG/1
10 TABLET ORAL EVERY 8 HOURS PRN
Status: DISCONTINUED | OUTPATIENT
Start: 2020-08-05 | End: 2020-08-08 | Stop reason: HOSPADM

## 2020-08-05 RX ORDER — BISACODYL 10 MG
10 SUPPOSITORY, RECTAL RECTAL
Status: DISCONTINUED | OUTPATIENT
Start: 2020-08-05 | End: 2020-08-08 | Stop reason: HOSPADM

## 2020-08-05 RX ORDER — ACETAMINOPHEN 325 MG/1
650 TABLET ORAL EVERY 6 HOURS PRN
Status: DISCONTINUED | OUTPATIENT
Start: 2020-08-05 | End: 2020-08-08 | Stop reason: HOSPADM

## 2020-08-05 RX ORDER — MORPHINE SULFATE 15 MG/1
7.5 TABLET ORAL EVERY 6 HOURS PRN
Status: DISCONTINUED | OUTPATIENT
Start: 2020-08-05 | End: 2020-08-08 | Stop reason: HOSPADM

## 2020-08-05 RX ADMIN — CLOSTRIDIUM TETANI TOXOID ANTIGEN (FORMALDEHYDE INACTIVATED), CORYNEBACTERIUM DIPHTHERIAE TOXOID ANTIGEN (FORMALDEHYDE INACTIVATED), BORDETELLA PERTUSSIS TOXOID ANTIGEN (GLUTARALDEHYDE INACTIVATED), BORDETELLA PERTUSSIS FILAMENTOUS HEMAGGLUTININ ANTIGEN (FORMALDEHYDE INACTIVATED), BORDETELLA PERTUSSIS PERTACTIN ANTIGEN, AND BORDETELLA PERTUSSIS FIMBRIAE 2/3 ANTIGEN 0.5 ML: 5; 2; 2.5; 5; 3; 5 INJECTION, SUSPENSION INTRAMUSCULAR at 14:46

## 2020-08-05 RX ADMIN — LORAZEPAM 2 MG: 1 TABLET ORAL at 21:02

## 2020-08-05 RX ADMIN — SODIUM CHLORIDE: 9 INJECTION, SOLUTION INTRAVENOUS at 21:01

## 2020-08-05 RX ADMIN — BACLOFEN 10 MG: 10 TABLET ORAL at 22:26

## 2020-08-05 RX ADMIN — MORPHINE SULFATE 7.5 MG: 15 TABLET ORAL at 21:02

## 2020-08-05 RX ADMIN — LEVOFLOXACIN 750 MG: 5 INJECTION, SOLUTION INTRAVENOUS at 14:35

## 2020-08-05 SDOH — ECONOMIC STABILITY: TRANSPORTATION INSECURITY
IN THE PAST 12 MONTHS, HAS LACK OF TRANSPORTATION KEPT YOU FROM MEETINGS, WORK, OR FROM GETTING THINGS NEEDED FOR DAILY LIVING?: NO

## 2020-08-05 SDOH — ECONOMIC STABILITY: FOOD INSECURITY: WITHIN THE PAST 12 MONTHS, THE FOOD YOU BOUGHT JUST DIDN'T LAST AND YOU DIDN'T HAVE MONEY TO GET MORE.: NEVER TRUE

## 2020-08-05 SDOH — ECONOMIC STABILITY: TRANSPORTATION INSECURITY
IN THE PAST 12 MONTHS, HAS THE LACK OF TRANSPORTATION KEPT YOU FROM MEDICAL APPOINTMENTS OR FROM GETTING MEDICATIONS?: NO

## 2020-08-05 SDOH — HEALTH STABILITY: MENTAL HEALTH: HOW OFTEN DO YOU HAVE A DRINK CONTAINING ALCOHOL?: 2-3 TIMES A WEEK

## 2020-08-05 SDOH — ECONOMIC STABILITY: FOOD INSECURITY: WITHIN THE PAST 12 MONTHS, YOU WORRIED THAT YOUR FOOD WOULD RUN OUT BEFORE YOU GOT MONEY TO BUY MORE.: NEVER TRUE

## 2020-08-05 ASSESSMENT — COGNITIVE AND FUNCTIONAL STATUS - GENERAL
SUGGESTED CMS G CODE MODIFIER MOBILITY: CL
TURNING FROM BACK TO SIDE WHILE IN FLAT BAD: A LITTLE
TOILETING: A LITTLE
WALKING IN HOSPITAL ROOM: TOTAL
DRESSING REGULAR LOWER BODY CLOTHING: A LITTLE
STANDING UP FROM CHAIR USING ARMS: A LITTLE
SUGGESTED CMS G CODE MODIFIER DAILY ACTIVITY: CJ
MOVING TO AND FROM BED TO CHAIR: A LITTLE
MOBILITY SCORE: 14
MOVING FROM LYING ON BACK TO SITTING ON SIDE OF FLAT BED: A LITTLE
CLIMB 3 TO 5 STEPS WITH RAILING: TOTAL
DAILY ACTIVITIY SCORE: 21
HELP NEEDED FOR BATHING: A LITTLE

## 2020-08-05 ASSESSMENT — ENCOUNTER SYMPTOMS
EYE REDNESS: 0
EYE PAIN: 0
SHORTNESS OF BREATH: 0
PALPITATIONS: 0
STRIDOR: 0
HALLUCINATIONS: 0
BLOOD IN STOOL: 0
VOMITING: 0
FEVER: 0
NERVOUS/ANXIOUS: 0
SEIZURES: 0
ABDOMINAL PAIN: 0
CHILLS: 0
NAUSEA: 1
BRUISES/BLEEDS EASILY: 0
DIARRHEA: 0
SPEECH CHANGE: 0
ROS GI COMMENTS: ANOREXIA
SORE THROAT: 0
LOSS OF CONSCIOUSNESS: 0
MYALGIAS: 0
HEMOPTYSIS: 0
EYE DISCHARGE: 0
SPUTUM PRODUCTION: 0
FOCAL WEAKNESS: 0
COUGH: 0
FLANK PAIN: 0

## 2020-08-05 ASSESSMENT — LIFESTYLE VARIABLES
TOTAL SCORE: 0
HOW MANY TIMES IN THE PAST YEAR HAVE YOU HAD 5 OR MORE DRINKS IN A DAY: 0
HAVE YOU EVER FELT YOU SHOULD CUT DOWN ON YOUR DRINKING: NO
EVER_SMOKED: NEVER
EVER FELT BAD OR GUILTY ABOUT YOUR DRINKING: NO
TOTAL SCORE: 0
AVERAGE NUMBER OF DAYS PER WEEK YOU HAVE A DRINK CONTAINING ALCOHOL: 3
TOTAL SCORE: 0
EVER HAD A DRINK FIRST THING IN THE MORNING TO STEADY YOUR NERVES TO GET RID OF A HANGOVER: NO
ALCOHOL_USE: YES
ON A TYPICAL DAY WHEN YOU DRINK ALCOHOL HOW MANY DRINKS DO YOU HAVE: 3
HAVE PEOPLE ANNOYED YOU BY CRITICIZING YOUR DRINKING: NO
CONSUMPTION TOTAL: NEGATIVE

## 2020-08-05 ASSESSMENT — FIBROSIS 4 INDEX
FIB4 SCORE: 5.59
FIB4 SCORE: 5.59

## 2020-08-05 ASSESSMENT — PATIENT HEALTH QUESTIONNAIRE - PHQ9
2. FEELING DOWN, DEPRESSED, IRRITABLE, OR HOPELESS: NOT AT ALL
1. LITTLE INTEREST OR PLEASURE IN DOING THINGS: NOT AT ALL
SUM OF ALL RESPONSES TO PHQ9 QUESTIONS 1 AND 2: 0

## 2020-08-05 NOTE — ED NOTES
Pt is alert and oriented , pt is wheelchair bound. RLE is swollen, 3+ pitting edema, and red. Laceration on right great toe, red and swelling noted.

## 2020-08-05 NOTE — ED TRIAGE NOTES
Bib self for above complaints.     Chief Complaint   Patient presents with   • Laceration     pt states he was not paying attention and fell out of his wheel chair on friday night and cut his right big toe. pt attempted to manage his own laceration and feels it may be infected at this point. redness and swelling, denies pus to his knowledge.      /83   Pulse 97   Temp 36.8 °C (98.3 °F) (Temporal)   Resp 18   SpO2 100%

## 2020-08-05 NOTE — ED PROVIDER NOTES
ED Provider Note    CHIEF COMPLAINT  Chief Complaint   Patient presents with   • Laceration     pt states he was not paying attention and fell out of his wheel chair on friday night and cut his right big toe. pt attempted to manage his own laceration and feels it may be infected at this point. redness and swelling, denies pus to his knowledge.        HPI  Bernardino Hawthorne is a 70 y.o. male who presents to the emergency department for evaluation of a foot laceration.  The patient fell out of his wheelchair landing on his right foot with a tucked underneath him several days ago.  He said pain and swelling spread since that time as well as a laceration in the webspace between the first and second toe.  The foot and leg have become progressively more tender and red and swollen since that time.    The patient denies any other injury.  On review of systems he states his urine is foul-smelling and discolored.  He does use a urinal but does not self cath.  Denies any chest pain, cough, shortness of breath or COVID-19 exposure.  No abdominal pain incontinence of urine or stool.    REVIEW OF SYSTEMS  See HPI for further details. All other systems are negative.    PAST MEDICAL HISTORY  Past Medical History:   Diagnosis Date   • Anxiety    • Arthritis    • ASTHMA    • Heart burn    • Hypertension    • Indigestion    • Spinal cord injury, acute traumatic 1979    No motor fx & +sensation in RLE, tingling & +motor fx in LLE       FAMILY HISTORY  History reviewed. No pertinent family history.    SOCIAL HISTORY  Social History     Socioeconomic History   • Marital status:      Spouse name: Not on file   • Number of children: Not on file   • Years of education: Not on file   • Highest education level: Not on file   Occupational History   • Not on file   Social Needs   • Financial resource strain: Not on file   • Food insecurity     Worry: Not on file     Inability: Not on file   • Transportation needs     Medical: Not on  file     Non-medical: Not on file   Tobacco Use   • Smoking status: Never Smoker   • Smokeless tobacco: Never Used   Substance and Sexual Activity   • Alcohol use: Yes     Frequency: 4 or more times a week     Comment: 3-4 beers per day   • Drug use: No   • Sexual activity: Not on file   Lifestyle   • Physical activity     Days per week: Not on file     Minutes per session: Not on file   • Stress: Not on file   Relationships   • Social connections     Talks on phone: Not on file     Gets together: Not on file     Attends [employer] service: Not on file     Active member of club or organization: Not on file     Attends meetings of clubs or organizations: Not on file     Relationship status: Not on file   • Intimate partner violence     Fear of current or ex partner: Not on file     Emotionally abused: Not on file     Physically abused: Not on file     Forced sexual activity: Not on file   Other Topics Concern   • Not on file   Social History Narrative   • Not on file       SURGICAL HISTORY  Past Surgical History:   Procedure Laterality Date   • UMBILICAL HERNIA REPAIR N/A 6/14/2011    Procedure: UMBILICAL HERNIA REPAIR;  Surgeon: Rashad Marquez M.D.;  Location: SURGERY [facility];  Service:    • SUGAR BY LAPAROSCOPY  6/14/2011    Procedure: SUGAR BY LAPAROSCOPY;  Surgeon: Rashad Marquez M.D.;  Location: SURGERY [facility];  Service:    • CARPAL TUNNEL RELEASE  2003    right wrist   • PB ANESTH,LOWER LEG,OPEN SURGERY  2001    metal ron   • THORACIC FUSION POSTERIOR  1998   • TURBINOPLASTY  1998   • DE FOREARM/WRIST SURGERY UNLISTED  1974    right   • TONSILLECTOMY  1955       CURRENT MEDICATIONS  Home Medications     Reviewed by Miles Evans (Pharmacy Tech) on 08/05/20 at 1547  Med List Status: Complete   Medication Last Dose Status   baclofen (LIORESAL) 10 MG Tab 8/3/2020 Active   lorazepam (ATIVAN) 1 MG TABS 8/2/2020 Active                ALLERGIES  Allergies   Allergen Reactions   •  Penicillins Unspecified     Rxn - as a child         PHYSICAL EXAM  VITAL SIGNS: /83   Pulse 97   Temp 36.8 °C (98.3 °F) (Temporal)   Resp 18   SpO2 100%    Constitutional: Awake alert nontoxic no acute distress.   HENT: Normocephalic, Atraumatic, Bilateral external ears normal, Oropharynx moist, No oral exudates, Nose normal.   Eyes: PERRL, EOMI, Conjunctiva normal, No discharge.   Neck: Normal range of motion,  Cardiovascular: Normal heart rate, Normal rhythm, No murmurs, No rubs, No gallops.   Thorax & Lungs: Normal breath sounds, No respiratory distress, No wheezing, No chest tenderness.   Abdomen: Bowel sounds normal, Soft, No tenderness,   Skin: Warm, Dry, No erythema, No rash.  Musculoskeletal: Good range of motion in all major joints.  Right lower extremity has a large laceration between the webspace of his first and second toe.  The foot is swollen and tender.  He has lot of swelling tenderness around ankle.  He has redness extends of his right lower extremity to nearly the knee.  No calf tenderness.    Neurologic: Alert, No focal deficits noted.  Incomplete paraplegia at baseline.  Psychiatric: Affect normal    Results for orders placed or performed during the hospital encounter of 08/05/20   CBC WITH DIFFERENTIAL   Result Value Ref Range    WBC 6.5 4.8 - 10.8 K/uL    RBC 4.87 4.70 - 6.10 M/uL    Hemoglobin 15.9 14.0 - 18.0 g/dL    Hematocrit 45.9 42.0 - 52.0 %    MCV 94.3 81.4 - 97.8 fL    MCH 32.6 27.0 - 33.0 pg    MCHC 34.6 33.7 - 35.3 g/dL    RDW 43.6 35.9 - 50.0 fL    Platelet Count 148 (L) 164 - 446 K/uL    MPV 10.1 9.0 - 12.9 fL    Neutrophils-Polys 81.00 (H) 44.00 - 72.00 %    Lymphocytes 9.20 (L) 22.00 - 41.00 %    Monocytes 8.50 0.00 - 13.40 %    Eosinophils 0.50 0.00 - 6.90 %    Basophils 0.30 0.00 - 1.80 %    Immature Granulocytes 0.50 0.00 - 0.90 %    Nucleated RBC 0.00 /100 WBC    Neutrophils (Absolute) 5.26 1.82 - 7.42 K/uL    Lymphs (Absolute) 0.60 (L) 1.00 - 4.80 K/uL    Monos  (Absolute) 0.55 0.00 - 0.85 K/uL    Eos (Absolute) 0.03 0.00 - 0.51 K/uL    Baso (Absolute) 0.02 0.00 - 0.12 K/uL    Immature Granulocytes (abs) 0.03 0.00 - 0.11 K/uL    NRBC (Absolute) 0.00 K/uL   COMP METABOLIC PANEL   Result Value Ref Range    Sodium 134 (L) 135 - 145 mmol/L    Potassium 3.4 (L) 3.6 - 5.5 mmol/L    Chloride 95 (L) 96 - 112 mmol/L    Co2 21 20 - 33 mmol/L    Anion Gap 18.0 (H) 7.0 - 16.0    Glucose 95 65 - 99 mg/dL    Bun 12 8 - 22 mg/dL    Creatinine 0.72 0.50 - 1.40 mg/dL    Calcium 8.8 8.4 - 10.2 mg/dL    AST(SGOT) 95 (H) 12 - 45 U/L    ALT(SGPT) 61 (H) 2 - 50 U/L    Alkaline Phosphatase 49 30 - 99 U/L    Total Bilirubin 1.1 0.1 - 1.5 mg/dL    Albumin 3.4 3.2 - 4.9 g/dL    Total Protein 7.5 6.0 - 8.2 g/dL    Globulin 4.1 (H) 1.9 - 3.5 g/dL    A-G Ratio 0.8 g/dL   COVID/SARS CoV-2 PCR    Specimen: Nasopharyngeal; Respirate   Result Value Ref Range    COVID Order Status Received    LACTIC ACID   Result Value Ref Range    Lactic Acid 2.9 (H) 0.5 - 2.0 mmol/L   ESTIMATED GFR   Result Value Ref Range    GFR If African American >60 >60 mL/min/1.73 m 2    GFR If Non African American >60 >60 mL/min/1.73 m 2   SARS-CoV-2, PCR (In-House)   Result Value Ref Range    SARS-CoV-2 Source NP Swab             RADIOLOGY/PROCEDURES  DX-CHEST-PORTABLE (1 VIEW)   Final Result      1.  There is no acute cardiopulmonary process.      DX-TIBIA AND FIBULA RIGHT   Final Result      1.  Again seen intramedullary nail and screw fixation of a chronic distal tibial diaphyseal fracture. There is incomplete bony bridging anteriorly and laterally.      2.  Old posttraumatic change of the distal fibula.      3.  No acute fracture seen.      DX-FOOT-COMPLETE 3+ RIGHT   Final Result      1.  There is no acute displaced fracture of the right foot.   2.  There is diffuse forefoot and midfoot swelling with extensive osteopenia.            COURSE & MEDICAL DECISION MAKING  Pertinent Labs & Imaging studies reviewed. (See chart for  details)  Patient presents with a subacute open wound in the webspace.  First and second toes.  Is been present for some time but he has now developed pain swelling redness in his foot, and lower extremity up to his knee on the right-hand side.  Differential diagnosis is cellulitis, possible fracture, and possible DVT.    The DVT is unlikely even though his leg is much larger on that side.  I did an ultrasound is no DVT or abscess.    X-rays are negative for fracture.    He has fairly significant cellulitis and open his foot that extends up his leg.  His foot and ankle are very swollen.  At this point he is cultured and started on broad-spectrum antibiotics skin and soft tissue infection.    The patient also is concerned that he might have a urinary tract infection.  I have ordered a urinalysis.  The patient still cannot urinate.      He does not have urinary retention he just urinated before coming in here.  He also states that he feels like he is dehydrated.    At this point he is not critically ill is not hypotensive.  I do not think we can justify an invasive procedure.  He has been appropriately treated with Levaquin which will cover urinary infection as well.    The patient was hospitalized.  His urine to be collected upstairs.  This discussed with the hospitalist.  Patient's care is transferred time I discussed around 5 PM.  He is hospitalized in guarded condition.      FINAL IMPRESSION  1. Laceration of right foot, initial encounter     2. Cellulitis of right leg     3. Dysuria     4.  Possible UTI    Addendum: Correction: The ultrasound dictated but was not performed this patient.  Leg slightly swollen with some cellulitis is no evidence of DVT.  The swelling is all posterior.  This was dictated in error.      Electronically signed by: Osmani Keating M.D., 8/5/2020 2:08 PM

## 2020-08-06 LAB
ALBUMIN SERPL BCP-MCNC: 2.9 G/DL (ref 3.2–4.9)
ALBUMIN/GLOB SERPL: 0.8 G/DL
ALP SERPL-CCNC: 38 U/L (ref 30–99)
ALT SERPL-CCNC: 47 U/L (ref 2–50)
ANION GAP SERPL CALC-SCNC: 10 MMOL/L (ref 7–16)
AST SERPL-CCNC: 65 U/L (ref 12–45)
BASOPHILS # BLD AUTO: 0.5 % (ref 0–1.8)
BASOPHILS # BLD: 0.03 K/UL (ref 0–0.12)
BILIRUB SERPL-MCNC: 1.1 MG/DL (ref 0.1–1.5)
BUN SERPL-MCNC: 11 MG/DL (ref 8–22)
CALCIUM SERPL-MCNC: 8.3 MG/DL (ref 8.4–10.2)
CHLORIDE SERPL-SCNC: 99 MMOL/L (ref 96–112)
CO2 SERPL-SCNC: 25 MMOL/L (ref 20–33)
CREAT SERPL-MCNC: 0.64 MG/DL (ref 0.5–1.4)
EOSINOPHIL # BLD AUTO: 0.08 K/UL (ref 0–0.51)
EOSINOPHIL NFR BLD: 1.3 % (ref 0–6.9)
ERYTHROCYTE [DISTWIDTH] IN BLOOD BY AUTOMATED COUNT: 44.7 FL (ref 35.9–50)
GLOBULIN SER CALC-MCNC: 3.5 G/DL (ref 1.9–3.5)
GLUCOSE SERPL-MCNC: 78 MG/DL (ref 65–99)
HCT VFR BLD AUTO: 41.3 % (ref 42–52)
HGB BLD-MCNC: 13.9 G/DL (ref 14–18)
IMM GRANULOCYTES # BLD AUTO: 0.03 K/UL (ref 0–0.11)
IMM GRANULOCYTES NFR BLD AUTO: 0.5 % (ref 0–0.9)
LACTATE BLD-SCNC: 1.3 MMOL/L (ref 0.5–2)
LYMPHOCYTES # BLD AUTO: 1.06 K/UL (ref 1–4.8)
LYMPHOCYTES NFR BLD: 17 % (ref 22–41)
MAGNESIUM SERPL-MCNC: 2 MG/DL (ref 1.5–2.5)
MCH RBC QN AUTO: 32.5 PG (ref 27–33)
MCHC RBC AUTO-ENTMCNC: 33.7 G/DL (ref 33.7–35.3)
MCV RBC AUTO: 96.5 FL (ref 81.4–97.8)
MONOCYTES # BLD AUTO: 0.8 K/UL (ref 0–0.85)
MONOCYTES NFR BLD AUTO: 12.8 % (ref 0–13.4)
NEUTROPHILS # BLD AUTO: 4.24 K/UL (ref 1.82–7.42)
NEUTROPHILS NFR BLD: 67.9 % (ref 44–72)
NRBC # BLD AUTO: 0 K/UL
NRBC BLD-RTO: 0 /100 WBC
PLATELET # BLD AUTO: 133 K/UL (ref 164–446)
PMV BLD AUTO: 10.5 FL (ref 9–12.9)
POTASSIUM SERPL-SCNC: 3.2 MMOL/L (ref 3.6–5.5)
PROT SERPL-MCNC: 6.4 G/DL (ref 6–8.2)
RBC # BLD AUTO: 4.28 M/UL (ref 4.7–6.1)
SARS-COV-2 RNA RESP QL NAA+PROBE: NOTDETECTED
SODIUM SERPL-SCNC: 134 MMOL/L (ref 135–145)
SPECIMEN SOURCE: NORMAL
WBC # BLD AUTO: 6.2 K/UL (ref 4.8–10.8)

## 2020-08-06 PROCEDURE — 99215 OFFICE O/P EST HI 40 MIN: CPT | Performed by: INTERNAL MEDICINE

## 2020-08-06 PROCEDURE — 83605 ASSAY OF LACTIC ACID: CPT

## 2020-08-06 PROCEDURE — 80053 COMPREHEN METABOLIC PANEL: CPT

## 2020-08-06 PROCEDURE — 700102 HCHG RX REV CODE 250 W/ 637 OVERRIDE(OP): Performed by: INTERNAL MEDICINE

## 2020-08-06 PROCEDURE — 85025 COMPLETE CBC W/AUTO DIFF WBC: CPT

## 2020-08-06 PROCEDURE — 97161 PT EVAL LOW COMPLEX 20 MIN: CPT

## 2020-08-06 PROCEDURE — A9270 NON-COVERED ITEM OR SERVICE: HCPCS | Performed by: HOSPITALIST

## 2020-08-06 PROCEDURE — 83735 ASSAY OF MAGNESIUM: CPT

## 2020-08-06 PROCEDURE — 96375 TX/PRO/DX INJ NEW DRUG ADDON: CPT

## 2020-08-06 PROCEDURE — 97597 DBRDMT OPN WND 1ST 20 CM/<: CPT

## 2020-08-06 PROCEDURE — 700105 HCHG RX REV CODE 258: Performed by: HOSPITALIST

## 2020-08-06 PROCEDURE — 36415 COLL VENOUS BLD VENIPUNCTURE: CPT

## 2020-08-06 PROCEDURE — 700102 HCHG RX REV CODE 250 W/ 637 OVERRIDE(OP): Performed by: HOSPITALIST

## 2020-08-06 PROCEDURE — 700111 HCHG RX REV CODE 636 W/ 250 OVERRIDE (IP): Performed by: HOSPITALIST

## 2020-08-06 PROCEDURE — G0378 HOSPITAL OBSERVATION PER HR: HCPCS

## 2020-08-06 PROCEDURE — A9270 NON-COVERED ITEM OR SERVICE: HCPCS | Performed by: INTERNAL MEDICINE

## 2020-08-06 RX ORDER — CEPHALEXIN 250 MG/1
500 CAPSULE ORAL EVERY 6 HOURS
Status: DISCONTINUED | OUTPATIENT
Start: 2020-08-06 | End: 2020-08-08 | Stop reason: HOSPADM

## 2020-08-06 RX ADMIN — ENOXAPARIN SODIUM 40 MG: 40 INJECTION SUBCUTANEOUS at 06:29

## 2020-08-06 RX ADMIN — SODIUM CHLORIDE: 9 INJECTION, SOLUTION INTRAVENOUS at 18:07

## 2020-08-06 RX ADMIN — CEPHALEXIN 500 MG: 250 CAPSULE ORAL at 18:05

## 2020-08-06 RX ADMIN — MORPHINE SULFATE 4 MG: 4 INJECTION INTRAVENOUS at 21:02

## 2020-08-06 RX ADMIN — BACLOFEN 10 MG: 10 TABLET ORAL at 20:17

## 2020-08-06 RX ADMIN — LORAZEPAM 2 MG: 1 TABLET ORAL at 20:14

## 2020-08-06 ASSESSMENT — ENCOUNTER SYMPTOMS
CARDIOVASCULAR NEGATIVE: 1
EYES NEGATIVE: 1
RESPIRATORY NEGATIVE: 1
CONSTITUTIONAL NEGATIVE: 1
GASTROINTESTINAL NEGATIVE: 1

## 2020-08-06 NOTE — ASSESSMENT & PLAN NOTE
Denies having abdominal pain lacks right upper quadrant tenderness.  Hepatitis panel negative.  Trending down.

## 2020-08-06 NOTE — DISCHARGE PLANNING
Anticipated Discharge Disposition: Home    Action: SW completed chart review. Full assessment to come. Updated expected d/c date: [facility].     Barriers to Discharge: TBD    Plan: Home once medically clear. SW to follow up for d/c needs.

## 2020-08-06 NOTE — CARE PLAN
Problem: Communication  Goal: The ability to communicate needs accurately and effectively will improve  Outcome: PROGRESSING AS EXPECTED  Pt shares questions and concerns with treatment team. POC discussed with pt     Problem: Safety  Goal: Will remain free from falls  Outcome: PROGRESSING AS EXPECTED   Pt demonstrates effective use of call light. Personal belongings and call light are within reach. Safety precautions are in place
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
## 2020-08-06 NOTE — ASSESSMENT & PLAN NOTE
With laceration on the right big toe.  Started on Keflex   Wound cultures requested.  Discussed with orthopedic surgery.  Not a candidate for surgical intervention as wound is few days old.  Recommended wound care.

## 2020-08-06 NOTE — PROGRESS NOTES
Received report from NIKKIE Oneill. POC discussed. Patient awake, states that he is ready to home, but has no additional needs at this time. Safety precautions are in place

## 2020-08-06 NOTE — ASSESSMENT & PLAN NOTE
- Patient presented with cellulitis and a non healing wound on the right big toe.  - He has good circulation in the extremity as the area of the cellulitis is warm, however the wound does not appear to be improving.   - Would consider venous and arterial duplex.   - Does not seem like a staph infection. I would ideally use Unasyn/Clinda but given history of penicillin allergy and high risk for c diff I would use keflex.   - Would consult wound care to see the patient.   - Will also consult surgery for the non healing wound.

## 2020-08-06 NOTE — PROGRESS NOTES
4 pm: Rounded with Maddison MEREDITH at bedside. POC discussed. Orders placed for Unasyn     4:15 pm: Spoke to Viky from wound care. Viky reported that wound care RNs Izabella or Aurora will see pt today    4:40 New orders placed for keflex, due to pt. allergy to pcn    5:30 pm Report given to NIKKIE Hi, in [facility]

## 2020-08-06 NOTE — ED NOTES
Report to NIKKIE Ponce.  Aware that patient is on the way and that I am available for any questions or concerns regarding the patient.  Preparing for transfer via gurney w/ tele.  No change in condition on departing ED.

## 2020-08-06 NOTE — ASSESSMENT & PLAN NOTE
Likely secondary to reduced intravascular volume secondary to reduced oral intake/infection..  Continue to trend.  Encourage oral intake gentle intravenous fluids.

## 2020-08-06 NOTE — PROGRESS NOTES
1900: Received report from maynor Ponce RN. Pt status and POC discussed. Pt is in bed with no signs of distress, calm with unlabored breathing. Denies any needs at this time. Fall precautions in place. Call light within reach.   2100: pt c/o 6/10 pain to RLE/right foot, MD notified, new orders received. UA sent to lab, urine is clear light gayle and denies any UTI symptoms. Pt states from spinal cord injury his right leg is almost fully paralyzed but does have full sensation; left leg has full ROM but has decreased sensation- feels touch but does not feel pain. Wound care ordered and wound consult placed for wound to to right dorsal foot/ hallux, dressing changed at this time. Redness and 2-3+ pitting edema noted from foot to mid shin, black marker used to outline area of redness. Leg elevated with pillow.     2230: pt c/o spasms to RLE, baclofen PRN given. Pt states usually has to reposition this leg every 1-2 hours at home. Pt additionally reports he has not slept much in the last 3 days, sleeping only 1-2 hours at a time.   0100:  Report given to NIKKIE Oneill.

## 2020-08-06 NOTE — PROGRESS NOTES
Critical Care Progress Note    Date of admission  8/5/2020    Chief Complaint  70 y.o. male admitted 8/5/2020 with cellulitis.     Hospital Course    Patient was admitted for a non healing wound and cellulitis of the right lower extremity. Started on antibiotics (levaquin) in the ED. Hospitalist admitted him on Unasyn but could not start because of an history of allergy. Then started on keflex.      Interval Problem Update  Reviewed last 24 hour events:  No acute issues.     Review of Systems  Review of Systems   Constitutional: Negative.    HENT: Negative.    Eyes: Negative.    Respiratory: Negative.    Cardiovascular: Negative.    Gastrointestinal: Negative.    Musculoskeletal:        Severe muscle atrophy bilateral lower extremities.   Also noted the laceration on the big toe of the right foot. Cellulitis in the right lower extremity. Area marked with marker.    Skin: Negative.         I saw his wound with the wound care. There is a non healing wound on the dorsum of his right toe. There is a tract underneath going to his nail bed.         Vital Signs for last 24 hours   Temp:  [36.7 °C (98 °F)-36.8 °C (98.2 °F)] 36.7 °C (98.1 °F)  Pulse:  [75-86] 77  Resp:  [18] 18  BP: (108-145)/(59-74) 125/64  SpO2:  [95 %-99 %] 98 %    Hemodynamic parameters for last 24 hours       Respiratory Information for the last 24 hours       Physical Exam   Physical Exam  Constitutional:       Appearance: Normal appearance.   HENT:      Head: Normocephalic and atraumatic.      Nose: Nose normal.   Eyes:      Pupils: Pupils are equal, round, and reactive to light.   Neck:      Musculoskeletal: Normal range of motion.   Cardiovascular:      Rate and Rhythm: Normal rate.   Pulmonary:      Effort: Pulmonary effort is normal.      Breath sounds: Normal breath sounds.   Abdominal:      General: Abdomen is flat.      Palpations: Abdomen is soft.   Skin:     General: Skin is warm.      Capillary Refill: Capillary refill takes more than 3  seconds.      Comments: I saw his wound with the wound care. There is a non healing wound on the dorsum of his right toe. There is a tract underneath going to his nail bed.    Neurological:      Mental Status: He is alert.       See wound exam above.     Medications  Current Facility-Administered Medications   Medication Dose Route Frequency Provider Last Rate Last Dose   • cephALEXin (KEFLEX) capsule 500 mg  500 mg Oral Q6HRS Pito Washburn M.D.       • acetaminophen (TYLENOL) tablet 650 mg  650 mg Oral Q6HRS PRN Asem ESEQUIEL Sweet M.D.       • senna-docusate (PERICOLACE or SENOKOT S) 8.6-50 MG per tablet 2 Tab  2 Tab Oral BID Asem ESEQUIEL Sweet M.D.        And   • polyethylene glycol/lytes (MIRALAX) PACKET 1 Packet  1 Packet Oral QDAY PRN Asem ESEQUIEL Sweet M.D.        And   • magnesium hydroxide (MILK OF MAGNESIA) suspension 30 mL  30 mL Oral QDAY PRN Asem ESEQUIEL Sweet M.D.        And   • bisacodyl (DULCOLAX) suppository 10 mg  10 mg Rectal QDAY PRN Asem ESEQUIEL Sweet M.D.       • NS infusion   Intravenous Continuous Asem ESEQUIEL Sweet M.D. 75 mL/hr at 08/05/20 2101     • enoxaparin (LOVENOX) inj 40 mg  40 mg Subcutaneous DAILY Asem ESEQUIEL Sweet M.D.   40 mg at 08/06/20 0629   • ondansetron (ZOFRAN) syringe/vial injection 4 mg  4 mg Intravenous Q4HRS PRN Asem ESEQUIEL Sweet M.D.       • ondansetron (ZOFRAN ODT) dispertab 4 mg  4 mg Oral Q4HRS PRN Asem ESEQUIEL Sweet M.D.       • baclofen (LIORESAL) tablet 10 mg  10 mg Oral Q8HRS PRN Asem ESEQUIEL Sweet M.D.   10 mg at 08/05/20 2226   • LORazepam (ATIVAN) tablet 2 mg  2 mg Oral QHS Asem A LYUDMILA Sweet   2 mg at 08/05/20 2102   • morphine (pf) 4 MG/ML injection 2-4 mg  2-4 mg Intravenous Q3HRS PRN Asem A Mutasher, M.D.       • morphine (MS IR) tablet 7.5 mg  7.5 mg Oral Q6HRS PRN Mulugeta Sweet M.D.   7.5 mg at 08/05/20 2102       Fluids    Intake/Output Summary (Last 24 hours) at 8/6/2020 1656  Last data filed at 8/6/2020 1500  Gross per 24 hour   Intake --   Output 1150 ml    Net -1150 ml       Laboratory          Recent Labs     08/05/20  1423 08/06/20  0214   SODIUM 134* 134*   POTASSIUM 3.4* 3.2*   CHLORIDE 95* 99   CO2 21 25   BUN 12 11   CREATININE 0.72 0.64   MAGNESIUM  --  2.0   CALCIUM 8.8 8.3*     Recent Labs     08/05/20  1423 08/06/20  0214   ALTSGPT 61* 47   ASTSGOT 95* 65*   ALKPHOSPHAT 49 38   TBILIRUBIN 1.1 1.1   GLUCOSE 95 78     Recent Labs     08/05/20  1423 08/06/20  0214   WBC 6.5 6.2   NEUTSPOLYS 81.00* 67.90   LYMPHOCYTES 9.20* 17.00*   MONOCYTES 8.50 12.80   EOSINOPHILS 0.50 1.30   BASOPHILS 0.30 0.50   ASTSGOT 95* 65*   ALTSGPT 61* 47   ALKPHOSPHAT 49 38   TBILIRUBIN 1.1 1.1     Recent Labs     08/05/20  1423 08/06/20  0214   RBC 4.87 4.28*   HEMOGLOBIN 15.9 13.9*   HEMATOCRIT 45.9 41.3*   PLATELETCT 148* 133*       Imaging  X-Ray:  I have personally reviewed the images and compared with prior images.    Assessment/Plan  * Cellulitis  Assessment & Plan  - Patient presented with cellulitis and a non healing wound on the right big toe.  - He has good circulation in the extremity as the area of the cellulitis is warm, however the wound does not appear to be improving.   - Would consider venous and arterial duplex.   - Does not seem like a staph infection. I would ideally use Unasyn/Clinda but given history of penicillin allergy and high risk for c diff I would use keflex.   - Would consult wound care to see the patient.   - Will also consult surgery for the non healing wound.     Elevated liver enzymes  Assessment & Plan  Improving. Hep panel negative.     Dehydration  Assessment & Plan  Resolved. Allow oral fluids.     Lactic acidosis  Assessment & Plan  Now resolved.     Hypokalemia  Assessment & Plan  Continue to replace and monitor.     Paraplegia (HCC)- (present on admission)  Assessment & Plan  Wheelchair-bound.   No acute issues.        VTE:  Lovenox  Ulcer: PPI  Lines: None    I have performed a physical exam and reviewed and updated ROS and Plan today  (8/6/2020). In review of yesterday's note (8/5/2020), there are no changes except as documented above.     Discussed patient condition and risk of morbidity and/or mortality with RN and Pharmacy    Pito Washburn MD

## 2020-08-06 NOTE — CARE PLAN
Problem: Communication  Goal: The ability to communicate needs accurately and effectively will improve  Outcome: PROGRESSING AS EXPECTED  Note: AO4 able to verbalize needs clearly. Demonstrates appropriate use of call light.        Problem: Safety  Goal: Will remain free from falls  Outcome: PROGRESSING AS EXPECTED  Intervention: Assess risk factors for falls  Flowsheets  Taken 8/5/2020 2343  Date of Last Fall: 08/02/20  Fall Risk: High Risk to Fall - 2 or more points   History of fall: 2  Mobility Status Assessment: 2-2 Healthcare Providers Required for Assistance with Ambulation & Transfer  Risk for Injury-Any positive answers results in the pt being at high risk for fall related injury: Not Applicable  Taken 8/5/2020 2048  Pt Calls for Assistance: Yes  Intervention: Implement fall precautions  Flowsheets  Taken 8/5/2020 2343  Bed Alarm: Yes - Alarm On  IV Pole on Same Side of Bed as Bathroom: Yes  Taken 8/5/2020 2048  Environmental Precautions:   Treaded Slipper Socks on Patient   Personal Belongings, Wastebasket, Call Bell etc. in Easy Reach   Transferred to Stronger Side   Report Given to Other Health Care Providers Regarding Fall Risk   Bed in Low Position   Communication Sign for Patients & Families   Mobility Assessed & Appropriate Sign Placed

## 2020-08-07 ENCOUNTER — APPOINTMENT (OUTPATIENT)
Dept: RADIOLOGY | Facility: MEDICAL CENTER | Age: 70
DRG: 603 | End: 2020-08-07
Attending: INTERNAL MEDICINE
Payer: MEDICARE

## 2020-08-07 PROBLEM — I82.401 ACUTE DEEP VEIN THROMBOSIS (DVT) OF RIGHT LOWER EXTREMITY (HCC): Status: ACTIVE | Noted: 2020-08-07

## 2020-08-07 LAB
APTT PPP: 33 SEC (ref 24.7–36)
INR PPP: 0.93 (ref 0.87–1.13)
PROTHROMBIN TIME: 12.6 SEC (ref 12–14.6)
UFH PPP CHRO-ACNC: <0.1 IU/ML
UFH PPP CHRO-ACNC: <0.1 IU/ML

## 2020-08-07 PROCEDURE — 700102 HCHG RX REV CODE 250 W/ 637 OVERRIDE(OP): Performed by: INTERNAL MEDICINE

## 2020-08-07 PROCEDURE — 700111 HCHG RX REV CODE 636 W/ 250 OVERRIDE (IP): Performed by: HOSPITALIST

## 2020-08-07 PROCEDURE — 700105 HCHG RX REV CODE 258: Performed by: HOSPITALIST

## 2020-08-07 PROCEDURE — 85610 PROTHROMBIN TIME: CPT

## 2020-08-07 PROCEDURE — 93922 UPR/L XTREMITY ART 2 LEVELS: CPT

## 2020-08-07 PROCEDURE — 93971 EXTREMITY STUDY: CPT | Mod: RT

## 2020-08-07 PROCEDURE — 99232 SBSQ HOSP IP/OBS MODERATE 35: CPT | Performed by: FAMILY MEDICINE

## 2020-08-07 PROCEDURE — 36415 COLL VENOUS BLD VENIPUNCTURE: CPT

## 2020-08-07 PROCEDURE — A9270 NON-COVERED ITEM OR SERVICE: HCPCS | Performed by: HOSPITALIST

## 2020-08-07 PROCEDURE — 700111 HCHG RX REV CODE 636 W/ 250 OVERRIDE (IP): Performed by: FAMILY MEDICINE

## 2020-08-07 PROCEDURE — 85520 HEPARIN ASSAY: CPT

## 2020-08-07 PROCEDURE — 700101 HCHG RX REV CODE 250: Performed by: FAMILY MEDICINE

## 2020-08-07 PROCEDURE — 87070 CULTURE OTHR SPECIMN AEROBIC: CPT

## 2020-08-07 PROCEDURE — 85730 THROMBOPLASTIN TIME PARTIAL: CPT

## 2020-08-07 PROCEDURE — 770006 HCHG ROOM/CARE - MED/SURG/GYN SEMI*

## 2020-08-07 PROCEDURE — 700102 HCHG RX REV CODE 250 W/ 637 OVERRIDE(OP): Performed by: HOSPITALIST

## 2020-08-07 PROCEDURE — A9270 NON-COVERED ITEM OR SERVICE: HCPCS | Performed by: INTERNAL MEDICINE

## 2020-08-07 PROCEDURE — 87205 SMEAR GRAM STAIN: CPT

## 2020-08-07 RX ORDER — SODIUM CHLORIDE AND POTASSIUM CHLORIDE 150; 900 MG/100ML; MG/100ML
INJECTION, SOLUTION INTRAVENOUS CONTINUOUS
Status: DISCONTINUED | OUTPATIENT
Start: 2020-08-07 | End: 2020-08-08 | Stop reason: HOSPADM

## 2020-08-07 RX ORDER — HEPARIN SODIUM 1000 [USP'U]/ML
80 INJECTION, SOLUTION INTRAVENOUS; SUBCUTANEOUS ONCE
Status: COMPLETED | OUTPATIENT
Start: 2020-08-07 | End: 2020-08-07

## 2020-08-07 RX ORDER — HEPARIN SODIUM 5000 [USP'U]/100ML
0-25 INJECTION, SOLUTION INTRAVENOUS CONTINUOUS
Status: DISCONTINUED | OUTPATIENT
Start: 2020-08-07 | End: 2020-08-08

## 2020-08-07 RX ORDER — HEPARIN SODIUM 1000 [USP'U]/ML
40 INJECTION, SOLUTION INTRAVENOUS; SUBCUTANEOUS PRN
Status: DISCONTINUED | OUTPATIENT
Start: 2020-08-07 | End: 2020-08-08

## 2020-08-07 RX ADMIN — CEPHALEXIN 500 MG: 250 CAPSULE ORAL at 01:34

## 2020-08-07 RX ADMIN — SENNOSIDES-DOCUSATE SODIUM TAB 8.6-50 MG 2 TABLET: 8.6-5 TAB at 06:33

## 2020-08-07 RX ADMIN — CEPHALEXIN 500 MG: 250 CAPSULE ORAL at 17:58

## 2020-08-07 RX ADMIN — HEPARIN SODIUM 7200 UNITS: 1000 INJECTION, SOLUTION INTRAVENOUS; SUBCUTANEOUS at 12:43

## 2020-08-07 RX ADMIN — CEPHALEXIN 500 MG: 250 CAPSULE ORAL at 11:53

## 2020-08-07 RX ADMIN — SODIUM CHLORIDE: 9 INJECTION, SOLUTION INTRAVENOUS at 06:32

## 2020-08-07 RX ADMIN — POTASSIUM CHLORIDE AND SODIUM CHLORIDE: 900; 150 INJECTION, SOLUTION INTRAVENOUS at 16:47

## 2020-08-07 RX ADMIN — CEPHALEXIN 500 MG: 250 CAPSULE ORAL at 23:42

## 2020-08-07 RX ADMIN — BACLOFEN 10 MG: 10 TABLET ORAL at 20:46

## 2020-08-07 RX ADMIN — CEPHALEXIN 500 MG: 250 CAPSULE ORAL at 06:33

## 2020-08-07 RX ADMIN — MORPHINE SULFATE 7.5 MG: 15 TABLET ORAL at 23:42

## 2020-08-07 RX ADMIN — MORPHINE SULFATE 4 MG: 4 INJECTION INTRAVENOUS at 21:34

## 2020-08-07 RX ADMIN — HEPARIN SODIUM 3600 UNITS: 1000 INJECTION, SOLUTION INTRAVENOUS; SUBCUTANEOUS at 20:29

## 2020-08-07 RX ADMIN — LORAZEPAM 2 MG: 1 TABLET ORAL at 20:42

## 2020-08-07 RX ADMIN — HEPARIN SODIUM 18 UNITS/KG/HR: 5000 INJECTION, SOLUTION INTRAVENOUS at 12:42

## 2020-08-07 RX ADMIN — BACLOFEN 10 MG: 10 TABLET ORAL at 13:41

## 2020-08-07 ASSESSMENT — ENCOUNTER SYMPTOMS
HEADACHES: 0
RESPIRATORY NEGATIVE: 1
CHILLS: 0
GASTROINTESTINAL NEGATIVE: 1
FEVER: 0
DIZZINESS: 0
PALPITATIONS: 0
DEPRESSION: 0

## 2020-08-07 NOTE — CARE PLAN
Problem: Knowledge Deficit  Goal: Knowledge of the prescribed therapeutic regimen will improve  Outcome: PROGRESSING AS EXPECTED  Note: Patient/ family member updated on Plan Of Care and Nurses communications with Doctors.      Problem: Bowel/Gastric:  Goal: Will not experience complications related to bowel motility  Note: Encourage Patient to increase fluid intake and to adhere on schedule laxatives/ stool softeners to improve regularity in bowel elimination.

## 2020-08-07 NOTE — ASSESSMENT & PLAN NOTE
There was partially occlusive DVT with the right femoral and popliteal veins noted on Doppler venous study of the right lower extremity.  Started on heparin drip for now.  We will consider transition to oral if no procedures planned.
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
## 2020-08-07 NOTE — CARE PLAN
Problem: Communication  Goal: The ability to communicate needs accurately and effectively will improve  Outcome: PROGRESSING AS EXPECTED  Intervention: [place] patient and significant other/support system to call light to alert staff of needs  Flowsheets (Taken 8/7/2020 3896)  Oriented to::   Call Light & Bedside Controls   Unit Routine     Problem: Knowledge Deficit  Goal: Knowledge of disease process/condition, treatment plan, diagnostic tests, and medications will improve  Outcome: PROGRESSING AS EXPECTED     Problem: Pain Management  Goal: Pain level will decrease to patient's comfort goal  Outcome: PROGRESSING AS EXPECTED
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
## 2020-08-08 VITALS
WEIGHT: 198.41 LBS | OXYGEN SATURATION: 98 % | TEMPERATURE: 98.6 F | DIASTOLIC BLOOD PRESSURE: 62 MMHG | HEIGHT: 76 IN | BODY MASS INDEX: 24.16 KG/M2 | HEART RATE: 86 BPM | RESPIRATION RATE: 17 BRPM | SYSTOLIC BLOOD PRESSURE: 133 MMHG

## 2020-08-08 PROBLEM — E87.20 LACTIC ACIDOSIS: Status: RESOLVED | Noted: 2020-08-05 | Resolved: 2020-08-08

## 2020-08-08 PROBLEM — E86.0 DEHYDRATION: Status: RESOLVED | Noted: 2020-08-05 | Resolved: 2020-08-08

## 2020-08-08 PROBLEM — E87.6 HYPOKALEMIA: Status: RESOLVED | Noted: 2020-08-05 | Resolved: 2020-08-08

## 2020-08-08 PROBLEM — R74.8 ELEVATED LIVER ENZYMES: Status: RESOLVED | Noted: 2020-08-05 | Resolved: 2020-08-08

## 2020-08-08 LAB
ALBUMIN SERPL BCP-MCNC: 2.5 G/DL (ref 3.2–4.9)
ALBUMIN/GLOB SERPL: 0.8 G/DL
ALP SERPL-CCNC: 47 U/L (ref 30–99)
ALT SERPL-CCNC: 25 U/L (ref 2–50)
ANION GAP SERPL CALC-SCNC: 11 MMOL/L (ref 7–16)
AST SERPL-CCNC: 29 U/L (ref 12–45)
BACTERIA UR CULT: NORMAL
BASOPHILS # BLD AUTO: 0.6 % (ref 0–1.8)
BASOPHILS # BLD: 0.03 K/UL (ref 0–0.12)
BILIRUB SERPL-MCNC: 0.4 MG/DL (ref 0.1–1.5)
BUN SERPL-MCNC: 9 MG/DL (ref 8–22)
CALCIUM SERPL-MCNC: 8 MG/DL (ref 8.4–10.2)
CHLORIDE SERPL-SCNC: 102 MMOL/L (ref 96–112)
CO2 SERPL-SCNC: 21 MMOL/L (ref 20–33)
CREAT SERPL-MCNC: 0.63 MG/DL (ref 0.5–1.4)
EOSINOPHIL # BLD AUTO: 0.24 K/UL (ref 0–0.51)
EOSINOPHIL NFR BLD: 4.5 % (ref 0–6.9)
ERYTHROCYTE [DISTWIDTH] IN BLOOD BY AUTOMATED COUNT: 44.5 FL (ref 35.9–50)
GLOBULIN SER CALC-MCNC: 3.3 G/DL (ref 1.9–3.5)
GLUCOSE SERPL-MCNC: 72 MG/DL (ref 65–99)
GRAM STN SPEC: NORMAL
HCT VFR BLD AUTO: 39.6 % (ref 42–52)
HGB BLD-MCNC: 13.3 G/DL (ref 14–18)
IMM GRANULOCYTES # BLD AUTO: 0.04 K/UL (ref 0–0.11)
IMM GRANULOCYTES NFR BLD AUTO: 0.8 % (ref 0–0.9)
LYMPHOCYTES # BLD AUTO: 1.48 K/UL (ref 1–4.8)
LYMPHOCYTES NFR BLD: 27.9 % (ref 22–41)
MCH RBC QN AUTO: 32.4 PG (ref 27–33)
MCHC RBC AUTO-ENTMCNC: 33.6 G/DL (ref 33.7–35.3)
MCV RBC AUTO: 96.4 FL (ref 81.4–97.8)
MONOCYTES # BLD AUTO: 0.64 K/UL (ref 0–0.85)
MONOCYTES NFR BLD AUTO: 12.1 % (ref 0–13.4)
NEUTROPHILS # BLD AUTO: 2.88 K/UL (ref 1.82–7.42)
NEUTROPHILS NFR BLD: 54.1 % (ref 44–72)
NRBC # BLD AUTO: 0 K/UL
NRBC BLD-RTO: 0 /100 WBC
PLATELET # BLD AUTO: 180 K/UL (ref 164–446)
PMV BLD AUTO: 10.3 FL (ref 9–12.9)
POTASSIUM SERPL-SCNC: 3.7 MMOL/L (ref 3.6–5.5)
PROT SERPL-MCNC: 5.8 G/DL (ref 6–8.2)
RBC # BLD AUTO: 4.11 M/UL (ref 4.7–6.1)
SIGNIFICANT IND 70042: NORMAL
SIGNIFICANT IND 70042: NORMAL
SITE SITE: NORMAL
SITE SITE: NORMAL
SODIUM SERPL-SCNC: 134 MMOL/L (ref 135–145)
SOURCE SOURCE: NORMAL
SOURCE SOURCE: NORMAL
UFH PPP CHRO-ACNC: 0.55 IU/ML
UFH PPP CHRO-ACNC: 0.6 IU/ML
WBC # BLD AUTO: 5.3 K/UL (ref 4.8–10.8)

## 2020-08-08 PROCEDURE — 80053 COMPREHEN METABOLIC PANEL: CPT

## 2020-08-08 PROCEDURE — 99239 HOSP IP/OBS DSCHRG MGMT >30: CPT | Performed by: FAMILY MEDICINE

## 2020-08-08 PROCEDURE — A9270 NON-COVERED ITEM OR SERVICE: HCPCS | Performed by: FAMILY MEDICINE

## 2020-08-08 PROCEDURE — 700102 HCHG RX REV CODE 250 W/ 637 OVERRIDE(OP): Performed by: INTERNAL MEDICINE

## 2020-08-08 PROCEDURE — 700102 HCHG RX REV CODE 250 W/ 637 OVERRIDE(OP): Performed by: FAMILY MEDICINE

## 2020-08-08 PROCEDURE — 85025 COMPLETE CBC W/AUTO DIFF WBC: CPT

## 2020-08-08 PROCEDURE — 700101 HCHG RX REV CODE 250: Performed by: FAMILY MEDICINE

## 2020-08-08 PROCEDURE — 700111 HCHG RX REV CODE 636 W/ 250 OVERRIDE (IP): Performed by: FAMILY MEDICINE

## 2020-08-08 PROCEDURE — A9270 NON-COVERED ITEM OR SERVICE: HCPCS | Performed by: INTERNAL MEDICINE

## 2020-08-08 PROCEDURE — 36415 COLL VENOUS BLD VENIPUNCTURE: CPT

## 2020-08-08 PROCEDURE — 85520 HEPARIN ASSAY: CPT | Mod: 91

## 2020-08-08 RX ORDER — CEPHALEXIN 500 MG/1
500 CAPSULE ORAL EVERY 6 HOURS
Qty: 16 CAP | Refills: 0 | Status: SHIPPED | OUTPATIENT
Start: 2020-08-08 | End: 2020-08-12

## 2020-08-08 RX ORDER — MORPHINE SULFATE 15 MG/1
7.5 TABLET ORAL EVERY 6 HOURS PRN
Qty: 12 TAB | Refills: 0 | Status: SHIPPED | OUTPATIENT
Start: 2020-08-08 | End: 2020-08-11

## 2020-08-08 RX ORDER — OMEPRAZOLE 20 MG/1
20 CAPSULE, DELAYED RELEASE ORAL DAILY
Qty: 30 CAP | Refills: 0 | Status: ON HOLD | OUTPATIENT
Start: 2020-08-08 | End: 2022-06-05

## 2020-08-08 RX ADMIN — HEPARIN SODIUM 22 UNITS/KG/HR: 5000 INJECTION, SOLUTION INTRAVENOUS at 08:19

## 2020-08-08 RX ADMIN — CEPHALEXIN 500 MG: 250 CAPSULE ORAL at 11:33

## 2020-08-08 RX ADMIN — RIVAROXABAN 15 MG: 15 TABLET, FILM COATED ORAL at 13:05

## 2020-08-08 RX ADMIN — POTASSIUM CHLORIDE AND SODIUM CHLORIDE: 900; 150 INJECTION, SOLUTION INTRAVENOUS at 08:24

## 2020-08-08 RX ADMIN — CEPHALEXIN 500 MG: 250 CAPSULE ORAL at 05:03

## 2020-08-08 ASSESSMENT — COGNITIVE AND FUNCTIONAL STATUS - GENERAL
SUGGESTED CMS G CODE MODIFIER DAILY ACTIVITY: CJ
DAILY ACTIVITIY SCORE: 22
WALKING IN HOSPITAL ROOM: TOTAL
SUGGESTED CMS G CODE MODIFIER MOBILITY: CL
MOVING FROM LYING ON BACK TO SITTING ON SIDE OF FLAT BED: UNABLE
STANDING UP FROM CHAIR USING ARMS: A LITTLE
DRESSING REGULAR LOWER BODY CLOTHING: A LITTLE
TURNING FROM BACK TO SIDE WHILE IN FLAT BAD: A LITTLE
MOBILITY SCORE: 13
HELP NEEDED FOR BATHING: A LITTLE
CLIMB 3 TO 5 STEPS WITH RAILING: TOTAL

## 2020-08-08 NOTE — CARE PLAN
Problem: Communication  Goal: The ability to communicate needs accurately and effectively will improve  Outcome: PROGRESSING AS EXPECTED     Problem: Infection  Goal: Will remain free from infection  Outcome: PROGRESSING AS EXPECTED     Problem: Pain Management  Goal: Pain level will decrease to patient's comfort goal  Outcome: PROGRESSING AS EXPECTED

## 2020-08-08 NOTE — DISCHARGE SUMMARY
Discharge Summary    CHIEF COMPLAINT ON ADMISSION  Chief Complaint   Patient presents with   • Laceration     pt states he was not paying attention and fell out of his wheel chair on friday night and cut his right big toe. pt attempted to manage his own laceration and feels it may be infected at this point. redness and swelling, denies pus to his knowledge.        Reason for Admission  Laceration     Admission Date  8/5/2020    CODE STATUS  Full Code    HPI & HOSPITAL COURSE  This is a 70 y.o. male  was admitted for a non healing wound and cellulitis of the right lower extremity. Started on antibiotics (levaquin) in the ED. Hospitalist admitted him on Unasyn but could not start because of an history of allergy. Then started on keflex.  Doppler venous study on right lower extremity showed partially occlusive DVT in the right femoral and popliteal veins.  Due to open wound from laceration of the right big toe, orthopedic surgery consulted.  Did not recommend any surgical intervention and recommended wound care.  Starting heparin drip during this hospital stay for his DVT.  Wound care and dressing changes has been done during this hospital stay.  Cellulitis much improved over course of hospital stay.  Was switched to Xarelto and cleared for discharge from medical standpoint.    Therefore, he is discharged in guarded and stable condition to home with close outpatient follow-up.    The patient met 2-midnight criteria for an inpatient stay at the time of discharge.    Discharge Date  8/8/2020    FOLLOW UP ITEMS POST DISCHARGE  Follow-up with PCP in 1 week    DISCHARGE DIAGNOSES  Principal Problem:    Cellulitis POA: Unknown  Active Problems:    Acute deep vein thrombosis (DVT) of right lower extremity (HCC) POA: Yes    Paraplegia (HCC) POA: Yes    Anxiety POA: Unknown  Resolved Problems:    Lactic acidosis POA: Unknown    Dehydration POA: Unknown    Elevated liver enzymes POA: Unknown    Hypokalemia POA:  Unknown      FOLLOW UP  No future appointments.  No follow-up provider specified.    MEDICATIONS ON DISCHARGE     Medication List      START taking these medications      Instructions   cephALEXin 500 MG Caps  Commonly known as: KEFLEX   Take 1 Cap by mouth every 6 hours for 4 days.  Dose: 500 mg     morphine 15 MG tablet  Commonly known as: MS IR   Take 0.5 Tabs by mouth every 6 hours as needed for up to 3 days.  Dose: 7.5 mg     omeprazole 20 MG delayed-release capsule  Commonly known as: PRILOSEC   Take 1 Cap by mouth every day.  Dose: 20 mg     * rivaroxaban 15 MG Tabs tablet  Commonly known as: XARELTO   Take 1 Tab by mouth 2 times a day.  Dose: 15 mg     * rivaroxaban 20 MG Tabs tablet  Start taking on: August 29, 2020  Commonly known as: XARELTO   Take 1 Tab by mouth with dinner.  Dose: 20 mg         * This list has 2 medication(s) that are the same as other medications prescribed for you. Read the directions carefully, and ask your doctor or other care provider to review them with you.            CONTINUE taking these medications      Instructions   Ativan 1 MG Tabs  Generic drug: LORazepam   Take 2 mg by mouth every bedtime. 2 tablets = 2 mg  Dose: 2 mg     baclofen 10 MG Tabs  Commonly known as: LIORESAL   Take 1 Tab by mouth every 8 hours as needed (spasms).  Dose: 10 mg     mirtazapine 15 MG Tabs  Commonly known as: REMERON   Take 15 mg by mouth every evening. Indications: appetite stimulant  Dose: 15 mg            Allergies  Allergies   Allergen Reactions   • Penicillins Unspecified     Rxn - as a child         DIET  Orders Placed This Encounter   Procedures   • Diet Order     Standing Status:   Standing     Number of Occurrences:   1     Order Specific Question:   Diet:     Answer:   Regular [1]       ACTIVITY  As tolerated.  Weight bearing as tolerated    CONSULTATIONS  Orthopedic surgery    PROCEDURES  None    LABORATORY  Lab Results   Component Value Date    SODIUM 134 (L) 08/08/2020    POTASSIUM  3.7 08/08/2020    CHLORIDE 102 08/08/2020    CO2 21 08/08/2020    GLUCOSE 72 08/08/2020    BUN 9 08/08/2020    CREATININE 0.63 08/08/2020        Lab Results   Component Value Date    WBC 5.3 08/08/2020    HEMOGLOBIN 13.3 (L) 08/08/2020    HEMATOCRIT 39.6 (L) 08/08/2020    PLATELETCT 180 08/08/2020        Total time of the discharge process exceeds 34 minutes.
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
## 2020-08-08 NOTE — WOUND TEAM
Consult placed for Wound Team to see right toe laceration. This was seen by Wound Team on 08/05/2020, orders written, notes state wound has improved. New consult completed.

## 2020-08-08 NOTE — DISCHARGE INSTRUCTIONS
Discharge Instructions    Discharged to home by car with self. Discharged via wheelchair, hospital escort: Yes.  Special equipment needed: Wheelchair    Be sure to schedule a follow-up appointment with your primary care doctor or any specialists as instructed.     Discharge Plan:        I understand that a diet low in cholesterol, fat, and sodium is recommended for good health. Unless I have been given specific instructions below for another diet, I accept this instruction as my diet prescription.   Other diet: As tolerated    Special Instructions:   Deep Vein Thrombosis Discharge Instructions    A deep vein thrombosis (DVT) is a blood clot (thrombus) that develops in a deep vein. A DVT is a clot in the deep, larger veins of the leg, arm, or pelvis. These are more dangerous than clots that might form in veins on the surface of the body. Deep vein thrombosis can lead to complications if the clot breaks off and travels in the bloodstream to the lungs.     CAUSES  Blood clots form in a vein for different reasons. Usually several things cause blood clots. They include:   · The flow of blood slows down.   · The inside of the vein is damaged in some way.   · The person has a condition that makes blood clot more easily. These conditions may include:  · Older age (especially over 75 years old).  · Having a history of blood clots.  · Having major or lengthy surgery. Hip surgery is particularly high-risk.   · Breaking a hip or leg.  · Sitting or lying still for a long time.  · Cancer or cancer treatment.  · Having a long, thin tube (catheter) placed inside a vein during a medical procedure.   · Being overweight (obese).  · Pregnancy and childbirth.  · Medicines with estrogen.  · Smoking.  · Other circulation or heart problems.     SYMPTOMS  When a clot forms, it can either partially or totally block the blood flow in that vein. Symptoms of a DVT can include:  · Swelling of the leg or arm, especially if one side is much  worse.  · Warmth and redness of the leg or arm, especially if one side is much worse.   · Pain in an arm or leg. If the clot is in the leg, symptoms may be more noticeable or worse when standing or walking.  If the blood clot travels to the lung, it may cause:  · Shortness of breath.  · Chest pain. The pain may be worsened by deep breaths.   · Coughing up thick mucus (phlegm), possibly flecked with blood.   Anyone with these symptoms should get emergency medical treatment right away. Call your local emergency  Services (911 in [place] if you have these symptoms.     DIAGNOSIS  If a DVT is suspected, your caregiver will take a full medical history. He or she will also perform a physical exam. Tests that also may be required include:   · Studies of the clotting properties of the blood.   · An ultrasound scan.   · X-rays to show the flow of blood when special dye is injected into the veins (venography).   · Studies of your lungs if you have any chest symptoms.     PREVENTION  · Exercise the legs regularly. Take a brisk 30 minute walk every day.   · Maintain a weight that is appropriate for your height.  · Avoid sitting or lying in bed for long periods of time without moving your legs.   · Women, particularly those over the age of 35, should consider the risks and benefits of taking estrogen medicine, including birth control pills.   · Do not smoke, especially if you take estrogen medicines.   · Long-distance travel can increase your risk. You should exercise your legs by walking or pumping the muscles every hour.   · In hospital prevention: Prevention may include medical and non medical measures.     TREATMENT  · The most common treatment for DVT is blood thinning (anticoagulant) medicine, which reduces the blood's tendency to clot. Anticoagulants can stop new blood clots from forming and old ones from growing. They cannot dissolve existing clots. Your body does this by itself over time. Anticoagulants can be given by  mouth, by intravenous (IV) access, or by injection. Your caregiver will determine the best program for you.   · Less commonly, clot-dissolving drugs (thrombolytics) are used to dissolve a DVT. They carry a high risk of bleeding, so they are used mainly in severe cases.   · Very rarely, a blood clot in the leg needs to be removed surgically.   · If you are unable to take anticoagulants, your caregiver may arrange for you to have a filter placed in a main vein in your belly (abdomen). This filter prevents clots from traveling to your lungs.     HOME CARE INSTRUCTIONS  Take all medicines prescribed by your caregiver. Follow the directions carefully.   · You will most likely continue taking anticoagulants after you leave the hospital. Your caregiver will advise you on the length of treatment (usually 3 to 5 months, sometimes for life).   · Taking too much or too little of an anticoagulant is dangerous. While taking this type of medicine, you will need to have regular blood tests to be sure the dose is correct. The dose can change for many reasons. It is critically important that you take this medicine exactly as prescribed, and that you have blood tests exactly as directed.   · Many foods can interfere with anticoagulants. These include foods high in vitamin K, such as spinach, kale, broccoli, cabbage, mat and turnip greens, Sigel sprouts, peas, cauliflower, seaweed, parsley, beef and pork liver, green tea, and soybean oil. Your caregiver should discuss limits on these foods with you or you should arrange a visit with a dietician to answer your questions.   · Many medicines can interfere with anticoagulants. You must tell your caregiver about any and all medicines you take. This includes all vitamins and supplements. Be especially cautious with aspirin and anti-inflammatory medicines. Ask your caregiver before taking these.   · Anticoagulants can have side effects, mostly excessive bruising or bleeding. You will  need to hold pressure over cuts for longer than usual. Avoid alcoholic drinks or consume only very small amounts while taking this medicine.    If you are taking an anticoagulant:  · Wear a medical alert bracelet.  · Notify your dentist or other caregivers before procedures.  · Avoid contact sports.    · Ask your caregiver how soon you can go back to normal activities. Not being active can lead to new clots. Ask for a list of what you should and should not do.   · Exercise your lower leg muscles. This is important while traveling.   · You may need to wear compression stockings. These are tight elastic stockings that apply pressure to the lower legs. This can help keep the blood in the legs from clotting.   · If you are a smoker, you should quit.   · Learn as much as you can about DVT.     SEEK MEDICAL CARE IF:  · You have unusual bruising or any bleeding problems.  · The swelling or pain in your affected arm or leg is not gradually improving.   · You anticipate surgery or long-distance travel. You should get specific advice on DVT prevention.   · You discover other family members with blood clots. This may require further testing for inherited diseases or conditions.     SEEK IMMEDIATE MEDICAL CARE IF:  · You develop chest pain.  · You develop severe shortness of breath.  · You begin to cough up bloody mucus or phlegm (sputum).  · You feel dizzy or faint.   · You develop swelling or pain in the leg.  · You have breathing problems after traveling.    MAKE SURE YOU:  · Understand these instructions.  · Will watch your condition.  · Will get help right away if you are not doing well or get worse.    and None    · Is patient discharged on Warfarin / Coumadin?   No Xarelto  Dressing Change Instructions: Remove dressing, clean wound with wound cleanser, pack with silver strip packing and cover with foam secured with hypafix.       Depression / Suicide Risk    As you are discharged from this [facility] facility, it is  important to learn how to keep safe from harming yourself.    Recognize the warning signs:  · Abrupt changes in personality, positive or negative- including increase in energy   · Giving away possessions  · Change in eating patterns- significant weight changes-  positive or negative  · Change in sleeping patterns- unable to sleep or sleeping all the time   · Unwillingness or inability to communicate  · Depression  · Unusual sadness, discouragement and loneliness  · Talk of wanting to die  · Neglect of personal appearance   · Rebelliousness- reckless behavior  · Withdrawal from people/activities they love  · Confusion- inability to concentrate     If you or a loved one observes any of these behaviors or has concerns about self-harm, here's what you can do:  · Talk about it- your feelings and reasons for harming yourself  · Remove any means that you might use to hurt yourself (examples: pills, rope, extension cords, firearm)  · Get professional help from the community (Mental Health, Substance Abuse, psychological counseling)  · Do not be alone:Call your Safe Contact- someone whom you trust who will be there for you.  · Call your local CRISIS HOTLINE 733-2496 or 056-548-1124  · Call your local Children's Mobile Crisis Response Team [place] (863) 034-0731 or www.AFreeze  · Call the toll free National Suicide Prevention Hotlines   · National Suicide Prevention Lifeline 904-524-LWFD (5952)  · National Hope Line Network 800-SUICIDE (650-1934)

## 2020-08-08 NOTE — PROGRESS NOTES
Received report from NIKKIE Murcia. Assumed care at 0700. A/Ox4 c/o mild pain. R LE observed, swelling and redness has decreased. Heparin drip verified, see MAR. Plan of care discussed including antibiotics, DVT prophylaxis, and wound care. Patient verbalized understanding. Fall precautions in place. Call light within reach. Hourly rounding in place.

## 2020-08-08 NOTE — CARE PLAN
Problem: Safety  Goal: Will remain free from falls  Outcome: PROGRESSING AS EXPECTED  Intervention: Implement fall precautions  Flowsheets (Taken 8/8/2020 0141)  Bed Alarm: Yes - Alarm On  Environmental Precautions: Bed in Low Position  Note: Patient will be free from injury or fall incident- instruction for use of call light given . Call light placed w/. In easy reach.

## 2020-08-08 NOTE — PROGRESS NOTES
Discharge paperwork reviewed with patient at bedside. Copy given. Questions encouraged and answered. Dressing change demonstrated, wound care instructions provided. IV removed. Pain controlled, VSS. Patient escorted to ED entrance via wheelchair. Patient discharged to home.

## 2020-08-10 LAB
BACTERIA BLD CULT: NORMAL
BACTERIA BLD CULT: NORMAL
BACTERIA WND AEROBE CULT: NORMAL
GRAM STN SPEC: NORMAL
SIGNIFICANT IND 70042: NORMAL
SITE SITE: NORMAL
SOURCE SOURCE: NORMAL

## 2021-05-20 ENCOUNTER — IMMUNIZATION (OUTPATIENT)
Dept: FAMILY PLANNING/WOMEN'S HEALTH CLINIC | Facility: IMMUNIZATION CENTER | Age: 71
End: 2021-05-20
Attending: INTERNAL MEDICINE
Payer: MEDICARE

## 2021-05-20 DIAGNOSIS — Z23 ENCOUNTER FOR VACCINATION: Primary | ICD-10-CM

## 2021-05-20 PROCEDURE — 0002A PFIZER SARS-COV-2 VACCINE: CPT | Performed by: INTERNAL MEDICINE

## 2021-05-20 PROCEDURE — 91300 PFIZER SARS-COV-2 VACCINE: CPT | Performed by: INTERNAL MEDICINE

## 2021-05-25 ENCOUNTER — PATIENT OUTREACH (OUTPATIENT)
Dept: HEALTH INFORMATION MANAGEMENT | Facility: OTHER | Age: 71
End: 2021-05-25

## 2021-05-25 NOTE — PROGRESS NOTES
Outcome: Left Message to schedule Comprehensive Health Assessment.    Please transfer to Patient Outreach Team at [phone] when patient returns call.      Attempt # 1

## 2021-11-11 NOTE — PROGRESS NOTES
Outcome: Left Message    Please transfer to Patient Outreach Team at [phone] when patient returns call.    Attempt # 2

## 2022-03-07 ENCOUNTER — TELEPHONE (OUTPATIENT)
Dept: HEALTH INFORMATION MANAGEMENT | Facility: OTHER | Age: 72
End: 2022-03-07

## 2022-06-04 ENCOUNTER — HOSPITAL ENCOUNTER (INPATIENT)
Facility: MEDICAL CENTER | Age: 72
LOS: 1 days | DRG: 558 | End: 2022-06-06
Attending: EMERGENCY MEDICINE | Admitting: HOSPITALIST
Payer: MEDICARE

## 2022-06-04 DIAGNOSIS — M62.82 NON-TRAUMATIC RHABDOMYOLYSIS: ICD-10-CM

## 2022-06-04 DIAGNOSIS — E87.20 LACTIC ACIDOSIS: ICD-10-CM

## 2022-06-04 DIAGNOSIS — R79.89 ELEVATED TROPONIN: ICD-10-CM

## 2022-06-04 DIAGNOSIS — E86.0 DEHYDRATION: ICD-10-CM

## 2022-06-04 PROCEDURE — 99285 EMERGENCY DEPT VISIT HI MDM: CPT

## 2022-06-05 ENCOUNTER — APPOINTMENT (OUTPATIENT)
Dept: RADIOLOGY | Facility: MEDICAL CENTER | Age: 72
DRG: 558 | End: 2022-06-05
Attending: EMERGENCY MEDICINE
Payer: MEDICARE

## 2022-06-05 PROBLEM — R79.89 ELEVATED TROPONIN: Status: ACTIVE | Noted: 2022-06-05

## 2022-06-05 PROBLEM — N39.0 UTI (URINARY TRACT INFECTION): Status: ACTIVE | Noted: 2022-06-05

## 2022-06-05 PROBLEM — M62.82 RHABDOMYOLYSIS: Status: ACTIVE | Noted: 2022-06-05

## 2022-06-05 LAB
ALBUMIN SERPL BCP-MCNC: 4 G/DL (ref 3.2–4.9)
ALBUMIN/GLOB SERPL: 1 G/DL
ALP SERPL-CCNC: 50 U/L (ref 30–99)
ALT SERPL-CCNC: 30 U/L (ref 2–50)
ANION GAP SERPL CALC-SCNC: 19 MMOL/L (ref 7–16)
APPEARANCE UR: CLEAR
APTT PPP: 33.6 SEC (ref 24.7–36)
AST SERPL-CCNC: 109 U/L (ref 12–45)
BACTERIA #/AREA URNS HPF: ABNORMAL /HPF
BASOPHILS # BLD AUTO: 0.6 % (ref 0–1.8)
BASOPHILS # BLD: 0.04 K/UL (ref 0–0.12)
BILIRUB SERPL-MCNC: 1.3 MG/DL (ref 0.1–1.5)
BILIRUB UR QL STRIP.AUTO: ABNORMAL
BUN SERPL-MCNC: 13 MG/DL (ref 8–22)
CALCIUM SERPL-MCNC: 9.4 MG/DL (ref 8.4–10.2)
CHLORIDE SERPL-SCNC: 101 MMOL/L (ref 96–112)
CK SERPL-CCNC: 2619 U/L (ref 0–154)
CO2 SERPL-SCNC: 20 MMOL/L (ref 20–33)
COLOR UR: YELLOW
CREAT SERPL-MCNC: 0.81 MG/DL (ref 0.5–1.4)
EKG IMPRESSION: NORMAL
EOSINOPHIL # BLD AUTO: 0.11 K/UL (ref 0–0.51)
EOSINOPHIL NFR BLD: 1.7 % (ref 0–6.9)
EPI CELLS #/AREA URNS HPF: ABNORMAL /HPF
ERYTHROCYTE [DISTWIDTH] IN BLOOD BY AUTOMATED COUNT: 47.2 FL (ref 35.9–50)
GFR SERPLBLD CREATININE-BSD FMLA CKD-EPI: 95 ML/MIN/1.73 M 2
GLOBULIN SER CALC-MCNC: 4 G/DL (ref 1.9–3.5)
GLUCOSE SERPL-MCNC: 78 MG/DL (ref 65–99)
GLUCOSE UR STRIP.AUTO-MCNC: NEGATIVE MG/DL
HCT VFR BLD AUTO: 48.2 % (ref 42–52)
HGB BLD-MCNC: 16.1 G/DL (ref 14–18)
IMM GRANULOCYTES # BLD AUTO: 0.01 K/UL (ref 0–0.11)
IMM GRANULOCYTES NFR BLD AUTO: 0.2 % (ref 0–0.9)
INR PPP: 1.02 (ref 0.87–1.13)
KETONES UR STRIP.AUTO-MCNC: 40 MG/DL
LACTATE BLD-SCNC: 1.7 MMOL/L (ref 0.5–2)
LACTATE BLD-SCNC: 3 MMOL/L (ref 0.5–2)
LEUKOCYTE ESTERASE UR QL STRIP.AUTO: ABNORMAL
LYMPHOCYTES # BLD AUTO: 1.88 K/UL (ref 1–4.8)
LYMPHOCYTES NFR BLD: 29.1 % (ref 22–41)
MAGNESIUM SERPL-MCNC: 2.1 MG/DL (ref 1.5–2.5)
MCH RBC QN AUTO: 32.2 PG (ref 27–33)
MCHC RBC AUTO-ENTMCNC: 33.4 G/DL (ref 33.7–35.3)
MCV RBC AUTO: 96.4 FL (ref 81.4–97.8)
MICRO URNS: ABNORMAL
MONOCYTES # BLD AUTO: 0.4 K/UL (ref 0–0.85)
MONOCYTES NFR BLD AUTO: 6.2 % (ref 0–13.4)
NEUTROPHILS # BLD AUTO: 4.01 K/UL (ref 1.82–7.42)
NEUTROPHILS NFR BLD: 62.2 % (ref 44–72)
NITRITE UR QL STRIP.AUTO: POSITIVE
NRBC # BLD AUTO: 0 K/UL
NRBC BLD-RTO: 0 /100 WBC
PH UR STRIP.AUTO: 6 [PH] (ref 5–8)
PLATELET # BLD AUTO: 161 K/UL (ref 164–446)
PMV BLD AUTO: 10.4 FL (ref 9–12.9)
POTASSIUM SERPL-SCNC: 3.4 MMOL/L (ref 3.6–5.5)
PROT SERPL-MCNC: 8 G/DL (ref 6–8.2)
PROT UR QL STRIP: 30 MG/DL
PROTHROMBIN TIME: 12.6 SEC (ref 12–14.6)
RBC # BLD AUTO: 5 M/UL (ref 4.7–6.1)
RBC # URNS HPF: ABNORMAL /HPF
RBC UR QL AUTO: ABNORMAL
SODIUM SERPL-SCNC: 140 MMOL/L (ref 135–145)
SP GR UR STRIP.AUTO: 1.02
TROPONIN T SERPL-MCNC: 61 NG/L (ref 6–19)
TROPONIN T SERPL-MCNC: 62 NG/L (ref 6–19)
TSH SERPL DL<=0.005 MIU/L-ACNC: 3.29 UIU/ML (ref 0.38–5.33)
WBC # BLD AUTO: 6.5 K/UL (ref 4.8–10.8)
WBC #/AREA URNS HPF: ABNORMAL /HPF

## 2022-06-05 PROCEDURE — 700105 HCHG RX REV CODE 258: Performed by: EMERGENCY MEDICINE

## 2022-06-05 PROCEDURE — 85730 THROMBOPLASTIN TIME PARTIAL: CPT

## 2022-06-05 PROCEDURE — 70450 CT HEAD/BRAIN W/O DYE: CPT | Mod: ME

## 2022-06-05 PROCEDURE — 700111 HCHG RX REV CODE 636 W/ 250 OVERRIDE (IP): Performed by: HOSPITALIST

## 2022-06-05 PROCEDURE — 700105 HCHG RX REV CODE 258: Performed by: HOSPITALIST

## 2022-06-05 PROCEDURE — 85610 PROTHROMBIN TIME: CPT

## 2022-06-05 PROCEDURE — 84484 ASSAY OF TROPONIN QUANT: CPT | Mod: 91

## 2022-06-05 PROCEDURE — 80053 COMPREHEN METABOLIC PANEL: CPT

## 2022-06-05 PROCEDURE — 700102 HCHG RX REV CODE 250 W/ 637 OVERRIDE(OP): Performed by: HOSPITALIST

## 2022-06-05 PROCEDURE — 85025 COMPLETE CBC W/AUTO DIFF WBC: CPT

## 2022-06-05 PROCEDURE — 83735 ASSAY OF MAGNESIUM: CPT

## 2022-06-05 PROCEDURE — 83605 ASSAY OF LACTIC ACID: CPT | Mod: 91

## 2022-06-05 PROCEDURE — 770020 HCHG ROOM/CARE - TELE (206)

## 2022-06-05 PROCEDURE — 71045 X-RAY EXAM CHEST 1 VIEW: CPT

## 2022-06-05 PROCEDURE — 87040 BLOOD CULTURE FOR BACTERIA: CPT

## 2022-06-05 PROCEDURE — 93005 ELECTROCARDIOGRAM TRACING: CPT

## 2022-06-05 PROCEDURE — 81001 URINALYSIS AUTO W/SCOPE: CPT

## 2022-06-05 PROCEDURE — A9270 NON-COVERED ITEM OR SERVICE: HCPCS | Performed by: HOSPITALIST

## 2022-06-05 PROCEDURE — 94760 N-INVAS EAR/PLS OXIMETRY 1: CPT

## 2022-06-05 PROCEDURE — 84443 ASSAY THYROID STIM HORMONE: CPT

## 2022-06-05 PROCEDURE — 82550 ASSAY OF CK (CPK): CPT

## 2022-06-05 PROCEDURE — 99223 1ST HOSP IP/OBS HIGH 75: CPT | Mod: AI | Performed by: HOSPITALIST

## 2022-06-05 PROCEDURE — 36415 COLL VENOUS BLD VENIPUNCTURE: CPT

## 2022-06-05 RX ORDER — HEPARIN SODIUM 5000 [USP'U]/ML
5000 INJECTION, SOLUTION INTRAVENOUS; SUBCUTANEOUS EVERY 8 HOURS
Status: DISCONTINUED | OUTPATIENT
Start: 2022-06-05 | End: 2022-06-06 | Stop reason: HOSPADM

## 2022-06-05 RX ORDER — SODIUM CHLORIDE 9 MG/ML
1000 INJECTION, SOLUTION INTRAVENOUS ONCE
Status: ACTIVE | OUTPATIENT
Start: 2022-06-05 | End: 2022-06-06

## 2022-06-05 RX ORDER — BACLOFEN 10 MG/1
1 TABLET ORAL 2 TIMES DAILY
COMMUNITY

## 2022-06-05 RX ORDER — ACETAMINOPHEN 325 MG/1
650 TABLET ORAL EVERY 6 HOURS PRN
Status: DISCONTINUED | OUTPATIENT
Start: 2022-06-05 | End: 2022-06-06 | Stop reason: HOSPADM

## 2022-06-05 RX ORDER — LORAZEPAM 1 MG/1
2 TABLET ORAL
Status: DISCONTINUED | OUTPATIENT
Start: 2022-06-05 | End: 2022-06-06 | Stop reason: HOSPADM

## 2022-06-05 RX ORDER — SODIUM CHLORIDE 9 MG/ML
1000 INJECTION, SOLUTION INTRAVENOUS ONCE
Status: COMPLETED | OUTPATIENT
Start: 2022-06-05 | End: 2022-06-05

## 2022-06-05 RX ORDER — SODIUM CHLORIDE 9 MG/ML
INJECTION, SOLUTION INTRAVENOUS CONTINUOUS
Status: DISCONTINUED | OUTPATIENT
Start: 2022-06-05 | End: 2022-06-06 | Stop reason: HOSPADM

## 2022-06-05 RX ORDER — BACLOFEN 10 MG/1
10 TABLET ORAL EVERY 8 HOURS PRN
Status: DISCONTINUED | OUTPATIENT
Start: 2022-06-05 | End: 2022-06-06 | Stop reason: HOSPADM

## 2022-06-05 RX ORDER — MIRTAZAPINE 15 MG/1
15 TABLET, FILM COATED ORAL NIGHTLY
Status: DISCONTINUED | OUTPATIENT
Start: 2022-06-05 | End: 2022-06-05

## 2022-06-05 RX ORDER — MIRTAZAPINE 7.5 MG/1
15 TABLET, FILM COATED ORAL
Status: DISCONTINUED | OUTPATIENT
Start: 2022-06-05 | End: 2022-06-05

## 2022-06-05 RX ORDER — POTASSIUM CHLORIDE 20 MEQ/1
20 TABLET, EXTENDED RELEASE ORAL ONCE
Status: COMPLETED | OUTPATIENT
Start: 2022-06-05 | End: 2022-06-05

## 2022-06-05 RX ORDER — OMEPRAZOLE 20 MG/1
20 CAPSULE, DELAYED RELEASE ORAL DAILY
Status: DISCONTINUED | OUTPATIENT
Start: 2022-06-05 | End: 2022-06-05

## 2022-06-05 RX ORDER — ONDANSETRON 4 MG/1
4 TABLET, ORALLY DISINTEGRATING ORAL EVERY 4 HOURS PRN
Status: DISCONTINUED | OUTPATIENT
Start: 2022-06-05 | End: 2022-06-06 | Stop reason: HOSPADM

## 2022-06-05 RX ORDER — ONDANSETRON 2 MG/ML
4 INJECTION INTRAMUSCULAR; INTRAVENOUS EVERY 4 HOURS PRN
Status: DISCONTINUED | OUTPATIENT
Start: 2022-06-05 | End: 2022-06-06 | Stop reason: HOSPADM

## 2022-06-05 RX ORDER — BISACODYL 10 MG
10 SUPPOSITORY, RECTAL RECTAL
Status: DISCONTINUED | OUTPATIENT
Start: 2022-06-05 | End: 2022-06-06 | Stop reason: HOSPADM

## 2022-06-05 RX ORDER — AMOXICILLIN 250 MG
2 CAPSULE ORAL 2 TIMES DAILY
Status: DISCONTINUED | OUTPATIENT
Start: 2022-06-05 | End: 2022-06-06 | Stop reason: HOSPADM

## 2022-06-05 RX ORDER — POLYETHYLENE GLYCOL 3350 17 G/17G
1 POWDER, FOR SOLUTION ORAL
Status: DISCONTINUED | OUTPATIENT
Start: 2022-06-05 | End: 2022-06-06 | Stop reason: HOSPADM

## 2022-06-05 RX ADMIN — SODIUM CHLORIDE 1000 ML: 9 INJECTION, SOLUTION INTRAVENOUS at 02:06

## 2022-06-05 RX ADMIN — CEFTRIAXONE SODIUM 1 G: 1 INJECTION, POWDER, FOR SOLUTION INTRAMUSCULAR; INTRAVENOUS at 16:13

## 2022-06-05 RX ADMIN — SODIUM CHLORIDE: 9 INJECTION, SOLUTION INTRAVENOUS at 04:09

## 2022-06-05 RX ADMIN — HEPARIN SODIUM 5000 UNITS: 5000 INJECTION, SOLUTION INTRAVENOUS; SUBCUTANEOUS at 16:12

## 2022-06-05 RX ADMIN — SODIUM CHLORIDE 1000 ML: 9 INJECTION, SOLUTION INTRAVENOUS at 00:26

## 2022-06-05 RX ADMIN — BACLOFEN 10 MG: 10 TABLET ORAL at 12:36

## 2022-06-05 RX ADMIN — POTASSIUM CHLORIDE 20 MEQ: 1500 TABLET, EXTENDED RELEASE ORAL at 05:24

## 2022-06-05 RX ADMIN — BACLOFEN 10 MG: 10 TABLET ORAL at 20:28

## 2022-06-05 RX ADMIN — HEPARIN SODIUM 5000 UNITS: 5000 INJECTION, SOLUTION INTRAVENOUS; SUBCUTANEOUS at 05:24

## 2022-06-05 RX ADMIN — LORAZEPAM 2 MG: 1 TABLET ORAL at 21:56

## 2022-06-05 RX ADMIN — HEPARIN SODIUM 5000 UNITS: 5000 INJECTION, SOLUTION INTRAVENOUS; SUBCUTANEOUS at 21:57

## 2022-06-05 ASSESSMENT — LIFESTYLE VARIABLES
TOTAL SCORE: 0
ON A TYPICAL DAY WHEN YOU DRINK ALCOHOL HOW MANY DRINKS DO YOU HAVE: 0
EVER FELT BAD OR GUILTY ABOUT YOUR DRINKING: NO
ALCOHOL_USE: NO
AVERAGE NUMBER OF DAYS PER WEEK YOU HAVE A DRINK CONTAINING ALCOHOL: 0
TOTAL SCORE: 0
TOTAL SCORE: 0
HAVE YOU EVER FELT YOU SHOULD CUT DOWN ON YOUR DRINKING: NO
HOW MANY TIMES IN THE PAST YEAR HAVE YOU HAD 5 OR MORE DRINKS IN A DAY: 0
CONSUMPTION TOTAL: NEGATIVE
EVER HAD A DRINK FIRST THING IN THE MORNING TO STEADY YOUR NERVES TO GET RID OF A HANGOVER: NO
HAVE PEOPLE ANNOYED YOU BY CRITICIZING YOUR DRINKING: NO

## 2022-06-05 ASSESSMENT — COGNITIVE AND FUNCTIONAL STATUS - GENERAL
SUGGESTED CMS G CODE MODIFIER MOBILITY: CL
DRESSING REGULAR LOWER BODY CLOTHING: A LOT
DAILY ACTIVITIY SCORE: 15
EATING MEALS: A LITTLE
TURNING FROM BACK TO SIDE WHILE IN FLAT BAD: A LITTLE
HELP NEEDED FOR BATHING: A LOT
TOILETING: A LITTLE
MOBILITY SCORE: 11
STANDING UP FROM CHAIR USING ARMS: A LOT
MOVING FROM LYING ON BACK TO SITTING ON SIDE OF FLAT BED: A LOT
CLIMB 3 TO 5 STEPS WITH RAILING: TOTAL
PERSONAL GROOMING: A LITTLE
SUGGESTED CMS G CODE MODIFIER DAILY ACTIVITY: CK
DRESSING REGULAR UPPER BODY CLOTHING: A LOT
MOVING TO AND FROM BED TO CHAIR: A LOT
WALKING IN HOSPITAL ROOM: TOTAL

## 2022-06-05 ASSESSMENT — ENCOUNTER SYMPTOMS
COUGH: 0
GASTROINTESTINAL NEGATIVE: 1
DIAPHORESIS: 0
BRUISES/BLEEDS EASILY: 0
FOCAL WEAKNESS: 0
EYES NEGATIVE: 1
MYALGIAS: 0
HEARTBURN: 0
SORE THROAT: 0
HEMOPTYSIS: 0
MUSCULOSKELETAL NEGATIVE: 1
DIZZINESS: 0
RESPIRATORY NEGATIVE: 1
WEAKNESS: 0
HEADACHES: 0
CONSTITUTIONAL NEGATIVE: 1
DEPRESSION: 0
BLOOD IN STOOL: 0
NECK PAIN: 0
INSOMNIA: 0
DOUBLE VISION: 0
FEVER: 0
NERVOUS/ANXIOUS: 1
ABDOMINAL PAIN: 0
PALPITATIONS: 0
WHEEZING: 0
SEIZURES: 0
BLURRED VISION: 0
NAUSEA: 0
CONSTIPATION: 0
VOMITING: 0
DIARRHEA: 0
LOSS OF CONSCIOUSNESS: 0
WEAKNESS: 1
SHORTNESS OF BREATH: 0
CHILLS: 0
FALLS: 1
CARDIOVASCULAR NEGATIVE: 1

## 2022-06-05 ASSESSMENT — PATIENT HEALTH QUESTIONNAIRE - PHQ9
SUM OF ALL RESPONSES TO PHQ9 QUESTIONS 1 AND 2: 0
2. FEELING DOWN, DEPRESSED, IRRITABLE, OR HOPELESS: NOT AT ALL
2. FEELING DOWN, DEPRESSED, IRRITABLE, OR HOPELESS: NOT AT ALL
SUM OF ALL RESPONSES TO PHQ9 QUESTIONS 1 AND 2: 0
1. LITTLE INTEREST OR PLEASURE IN DOING THINGS: NOT AT ALL
1. LITTLE INTEREST OR PLEASURE IN DOING THINGS: NOT AT ALL

## 2022-06-05 ASSESSMENT — FIBROSIS 4 INDEX
FIB4 SCORE: 8.65
FIB4 SCORE: 2.26
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
## 2022-06-05 NOTE — FLOWSHEET NOTE
4 Eyes Skin Assessment Completed by NIKKIE Szymanski and NIKKIE Morgan.    Head WDL  Ears WDL  Nose WDL  Mouth WDL Dry  Neck WDL  Breast/Chest WDL  Shoulder Blades WDL  Spine WDL  (R) Arm/Elbow/Hand Redness, Bruising, Abrasion and Discoloration  (L) Arm/Elbow/Hand Redness, Bruising, Abrasion and Discoloration  Abdomen WDL  Groin WDL  Scrotum/Coccyx/Buttocks Redness  (R) Leg Scab  (L) Leg Scab  (R) Heel/Foot/Toe Redness  (L) Heel/Foot/Toe Redness          Devices In Places Tele Box      Interventions In Place Pillows    Possible Skin Injury No    Pictures Uploaded Into Epic N/A  Wound Consult Placed N/A  RN Wound Prevention Protocol Ordered No

## 2022-06-05 NOTE — PROGRESS NOTES
Med rec partial per patient at bedside.  Patient unsure of the strengths of his medications. Unable to verify medication strengths with patient's pharmacy, [facility] on [address] ([phone]), as the pharmacy is closed at this time. Pharmacy opens at 10:00.  Allergies reviewed with patient.  No outpatient antibiotics within the last 30 days.    Addendum: med rec updated/complete with medication information per [facility]. [facility] states that patient is prescribed baclofen 10 mg with directions to take 1 tablet every 8 hours as needed; patient states he takes 1 tablet 2 times per day. [facility] states that patient is prescribed lorazepam 1 mg with directions to take 1 tablet every 8 hours as needed; patient states that he takes 2 tablets every night at bedtime.
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
## 2022-06-05 NOTE — ED NOTES
Deniz from Lab called with critical result of CPK of 2619 at 0123. Critical lab result read back to Deniz.   Dr. Kohli notified of critical lab result at 0124.  Critical lab result read back by Dr. Kohli.
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
## 2022-06-06 VITALS
WEIGHT: 176.59 LBS | HEIGHT: 76 IN | RESPIRATION RATE: 18 BRPM | SYSTOLIC BLOOD PRESSURE: 131 MMHG | HEART RATE: 73 BPM | OXYGEN SATURATION: 99 % | BODY MASS INDEX: 21.5 KG/M2 | TEMPERATURE: 97.5 F | DIASTOLIC BLOOD PRESSURE: 75 MMHG

## 2022-06-06 LAB
ANION GAP SERPL CALC-SCNC: 15 MMOL/L (ref 7–16)
BUN SERPL-MCNC: 8 MG/DL (ref 8–22)
CALCIUM SERPL-MCNC: 7.4 MG/DL (ref 8.4–10.2)
CHLORIDE SERPL-SCNC: 106 MMOL/L (ref 96–112)
CK SERPL-CCNC: 1463 U/L (ref 0–154)
CO2 SERPL-SCNC: 18 MMOL/L (ref 20–33)
CREAT SERPL-MCNC: 0.55 MG/DL (ref 0.5–1.4)
ERYTHROCYTE [DISTWIDTH] IN BLOOD BY AUTOMATED COUNT: 49.6 FL (ref 35.9–50)
GFR SERPLBLD CREATININE-BSD FMLA CKD-EPI: 107 ML/MIN/1.73 M 2
GLUCOSE SERPL-MCNC: 62 MG/DL (ref 65–99)
HCT VFR BLD AUTO: 38.8 % (ref 42–52)
HGB BLD-MCNC: 12.8 G/DL (ref 14–18)
MCH RBC QN AUTO: 32.7 PG (ref 27–33)
MCHC RBC AUTO-ENTMCNC: 33 G/DL (ref 33.7–35.3)
MCV RBC AUTO: 99 FL (ref 81.4–97.8)
PLATELET # BLD AUTO: 120 K/UL (ref 164–446)
PMV BLD AUTO: 10.3 FL (ref 9–12.9)
POTASSIUM SERPL-SCNC: 3.3 MMOL/L (ref 3.6–5.5)
RBC # BLD AUTO: 3.92 M/UL (ref 4.7–6.1)
SODIUM SERPL-SCNC: 139 MMOL/L (ref 135–145)
WBC # BLD AUTO: 4 K/UL (ref 4.8–10.8)

## 2022-06-06 PROCEDURE — 700111 HCHG RX REV CODE 636 W/ 250 OVERRIDE (IP): Performed by: HOSPITALIST

## 2022-06-06 PROCEDURE — 80048 BASIC METABOLIC PNL TOTAL CA: CPT

## 2022-06-06 PROCEDURE — 82550 ASSAY OF CK (CPK): CPT

## 2022-06-06 PROCEDURE — 85027 COMPLETE CBC AUTOMATED: CPT

## 2022-06-06 PROCEDURE — 99239 HOSP IP/OBS DSCHRG MGMT >30: CPT | Performed by: HOSPITALIST

## 2022-06-06 PROCEDURE — 700105 HCHG RX REV CODE 258: Performed by: HOSPITALIST

## 2022-06-06 RX ORDER — POTASSIUM CHLORIDE 20 MEQ/1
20 TABLET, EXTENDED RELEASE ORAL 2 TIMES DAILY
Status: DISCONTINUED | OUTPATIENT
Start: 2022-06-06 | End: 2022-06-06 | Stop reason: HOSPADM

## 2022-06-06 RX ADMIN — HEPARIN SODIUM 5000 UNITS: 5000 INJECTION, SOLUTION INTRAVENOUS; SUBCUTANEOUS at 05:26

## 2022-06-06 RX ADMIN — CEFTRIAXONE SODIUM 1 G: 1 INJECTION, POWDER, FOR SOLUTION INTRAMUSCULAR; INTRAVENOUS at 05:26

## 2022-06-06 ASSESSMENT — FIBROSIS 4 INDEX: FIB4 SCORE: 11.61

## 2022-06-06 NOTE — PROGRESS NOTES
Pt's HR has been in the 50s to 60s during the nigh, now he has been touching down to 45, asymptomatic. Dr Norman notified and orders received to obtain EKG if sustained.

## 2022-06-06 NOTE — PROGRESS NOTES
Bedside report received from Anahy LUNDY and assumed Pt's cares. Call light within reach. Safety measures in place.

## 2022-06-06 NOTE — PROGRESS NOTES
Telemetry shift summary:  Rhythm: SR, SB     HR Range: 50s to 60s   Down to 45      Measurements from strip printed at 02:01:23   AL: 0.16   QRS 0.08   QT 0.44     Ectopies: Occasional to frequent PVC; rare to occasional PAC; rare bigeminy and couplets.      At 02:42 4 beats of V-Tach

## 2022-06-06 NOTE — DISCHARGE SUMMARY
AGAINST MEDICAL ADVICE Discharge Summary    CHIEF COMPLAINT ON ADMISSION  Chief Complaint   Patient presents with   • Other     Pt was found down. Per pt he hit his car door alarm to get his neighbors attention, this took 6 hours prior to help arriving. Per EMS, the pts car door was open for a few days before the neighbors called a wellness check. Pt is AOx 4 at this time         Reason for Admission  EMS     Admission Date  6/4/2022    CODE STATUS  Prior    HPI & HOSPITAL COURSE  Mr. Bernardino Braden is a 72-year-old gentleman who was initially admitted because of rhabdomyolysis and acute urinary tract infection.  Patient was given fluid resuscitation and started on IV antibiotics with Rocephin.  Urine cultures and blood cultures at this point are pending.  Yesterday spoke with the patient in detail after I examined him and gave him plan of update.  The patient was agreeable and pleasant and cooperative.  This morning the patient told the nurse that he is leaving and within 1 minute the patient ripped out his IV pulled off some of his skin and was bleeding.  He did allow the nurse to bandage him up.  After that the patient got in his wheelchair as he is a functional quadriplegic and left the floor.  Security shortly after that picked him up and brought him back upstairs only to find out that the nurse did allow him to leave.  The patient then was able to be escorted out the front door and left AGAINST MEDICAL ADVICE.  I was not able to give him any further instructions as the patient left so quickly and dramatically.    Therefore, he is discharged in guarded and stable condition against medcial advice.    Discharge Date  6/6/2022    FOLLOW UP ITEMS POST DISCHARGE  Follow-up with the primary care physician in 2 to 3 days    DISCHARGE DIAGNOSES  Principal Problem:    Rhabdomyolysis POA: Yes  Active Problems:    Elevated troponin POA: Unknown    Hypokalemia POA: Unknown    Lactic acidosis POA: Unknown    UTI (urinary  tract infection) POA: Unknown    Paraplegia (HCC) POA: Yes    Anxiety POA: Yes  Resolved Problems:    * No resolved hospital problems. *      FOLLOW UP  No future appointments.  No follow-up provider specified.    MEDICATIONS ON DISCHARGE     Medication List      ASK your doctor about these medications      Instructions   baclofen 10 MG Tabs  Commonly known as: LIORESAL  Ask about: Which instructions should I use?   Take 1 Tablet by mouth 2 times a day.  Dose: 1 Tablet     LORazepam 1 MG Tabs  Commonly known as: ATIVAN   Take 2 mg by mouth at bedtime. 2 tablets = 2 mg.  Dose: 2 mg            Allergies  Allergies   Allergen Reactions   • Penicillins Unspecified     Rxn - as a child       DIET  No orders of the defined types were placed in this encounter.      ACTIVITY  As tolerated.  Weight bearing as tolerated    CONSULTATIONS  None    PROCEDURES  None    LABORATORY  Lab Results   Component Value Date    SODIUM 139 06/06/2022    POTASSIUM 3.3 (L) 06/06/2022    CHLORIDE 106 06/06/2022    CO2 18 (L) 06/06/2022    GLUCOSE 62 (L) 06/06/2022    BUN 8 06/06/2022    CREATININE 0.55 06/06/2022        Lab Results   Component Value Date    WBC 4.0 (L) 06/06/2022    HEMOGLOBIN 12.8 (L) 06/06/2022    HEMATOCRIT 38.8 (L) 06/06/2022    PLATELETCT 120 (L) 06/06/2022        Total time of the discharge process exceeds 35 minutes.

## 2022-06-06 NOTE — PROGRESS NOTES
"Notified by CNA that pt is wanting to leave, met pt in room pt in . Pt stated \"i'm going home\" infomred pt that if he were to leave now it would be against medical advice. Pt stated \"that's fine\" pt pulled IV out causing skin tear to area. Pt agreeable to RN bandaging skin tear. Tele box removed and pt wheeled himself out with belongings.   MD and charge RN notified.   "

## 2022-06-06 NOTE — CARE PLAN
The patient is Watcher - Medium risk of patient condition declining or worsening    Shift Goals  Clinical Goals: IV fluids. Presure sores prevention  Patient Goals: Sleep  Family Goals: No family present    Progress made toward(s) clinical / shift goals:  Assessed and monitored skin integrity frequently. Have been assisting him with repositioning and turn q2hrs. Using pillows to support position. Mepilex. Using draw sheets when repositioning him.  Waffle mattress in place.       Patient is not progressing towards the following goals:      Problem: Knowledge Deficit - Standard  Goal: Patient and family/care givers will demonstrate understanding of plan of care, disease process/condition, diagnostic tests and medications  Outcome: Progressing     Problem: Skin Integrity  Goal: Skin integrity is maintained or improved  Outcome: Progressing     Problem: Fall Risk  Goal: Patient will remain free from falls  Outcome: Progressing     Problem: Infection - Standard  Goal: Patient will remain free from infection  Outcome: Progressing

## 2022-06-10 LAB
BACTERIA BLD CULT: NORMAL
BACTERIA BLD CULT: NORMAL
SIGNIFICANT IND 70042: NORMAL
SIGNIFICANT IND 70042: NORMAL
SITE SITE: NORMAL
SITE SITE: NORMAL
SOURCE SOURCE: NORMAL
SOURCE SOURCE: NORMAL

## 2023-06-07 NOTE — ASSESSMENT & PLAN NOTE
Monitor troponin levels  May be secondary to the CPK elevation but may be secondary to underlying cardiac problems.   Pressure channel 4 zeroed.

## 2025-06-24 NOTE — REHAB-OT IDT TEAM NOTE
Dr. Wallace notified via [employer]serve of request for clarification on x6 bags IV potassium chloride ordered as pt was already administered PRN efferK 40mEq oral replacement this am for K level 3.2 mmol/L.    Per Dr. Wallace, 40 oral replacement is good. IV bags order d/c'd.    Also questioned if Dr. Wallace would like this nurse to hold 1200 40mEq oral lasix order.    Per Dr. Wallace, hold for today.   Occupational Therapy  Activities of Daily Living  Eating Initial:  5 - Standby Prompting/Supervision or Set-up  Eating Current:  6 - Modified Independent   Eating Description:  Increased time, Supervision for safety, Verbal cueing  Grooming Initial:  5 - Standby Prompting/Supervision or Set-up  Grooming Current:  5 - Standby Prompting/Supervision or Set-up   Grooming Description:   (wheelchair level)  Bathing Initial:  5 - Standby Prompting/Supervision or Set-up  Bathing Current:  5 - Standby Prompting/Supervision or Set-up   Bathing Description:  Grab bar, Tub bench, Hand held shower, Increased time, Supervision for safety, Verbal cueing, Set-up of equipment  Upper Body Dressing Initial:  5 - Standby Prompting/Supervision or Set-up  Upper Body Dressing Current:  5 - Standby Prompting/Supervision or Set-up   Upper Body Dressing Description:   (Mod I donning T-shirt)  Lower Body Dressing Initial:  5 - Standby Prompting/Supervsion or Set-up  Lower Body Dressing Current:  4- minimal assistance   Lower Body Dressing Description:  Seated edge of bed and partially in supine  Toileting Initial:  5 - Standby Prompting/Supervision or Set-up  Toileting Current:  2 - Max Assistance   Toileting Description:   (required assist to push down and pull up  pants  completed wiping with out assist )  Toilet Transfer Initial:  0 - Not tested, patient refused  Toilet Transfer Current:  4- minimal assist using grab bars   Toilet Transfer Description:  Inconsistent dependent on how patient feels that day  Tub / Shower Transfer Initial:  1 - Total Assistance  Tub / Shower Transfer Current:  4 - Minimal Assistance   Tub / Shower Transfer Description:  Grab bar, Increased time (cga with functional transfers to bench)  IADL:  TBA   Family Training/Education:  ongoing   DME/DC Recommendations:  Has transfer bench    Strengths:  Able to follow instructions, Making steady progress towards goals, Motivated for self care and independence, Pleasant  and cooperative and Willingly participates in therapeutic activities  Barriers:  Decreased endurance, Generalized weakness, Limited mobility and Poor balance, pain in bilateral hands/wrist with prior injuries, difficulty accepting new transfer techniques     # of short term goals set= 3   # of short term goals met= 2          Occupational Therapy Goals           Problem: Bathing     Dates: Start: 07/26/17       Goal: STG-Within one week, patient will bathe     Dates: Start: 07/26/17      Description: 1) Individualized Goal:  With mod I using AE and DME prn    2) Interventions:  OT Orthotics Training, OT E Stim Attended, OT Self Care/ADL, OT Cognitive Skill Dev, OT Community Reintegration, OT Manual Ther Technique, OT Neuro Re-Ed/Balance, OT Sensory Int Techniques, OT Therapeutic Activity, OT Evaluation and OT Therapeutic Exercise              Problem: Functional Transfers     Dates: Start: 07/26/17       Goal: STG-Within one week, patient will transfer to toilet     Dates: Start: 08/10/17      Description: 1) Individualized Goal:  With supv using dme as needed.    2) Interventions:  OT Self Care/ADL and OT Neuro Re-Ed/Balance            Goal: STG-Within one week, patient will transfer to tub/shower     Dates: Start: 08/10/17      Description: 1) Individualized Goal:with supv using dme as needed.    2) Interventions:  OT Self Care/ADL and OT Neuro Re-Ed/Balance              Problem: IADL's     Dates: Start: 08/10/17       Goal: STG-Within one week, patient will access kitchen area     Dates: Start: 08/10/17      Description: 1) Individualized Goal:  With supv at wheelchair level.    2) Interventions:  OT Self Care/ADL, OT Community Reintegration and OT Therapeutic Activity              Problem: OT Long Term Goals     Dates: Start: 07/27/17       Goal: LTG-By discharge, patient will complete basic self care tasks     Dates: Start: 07/27/17      Description: 1) Individualized Goal: modified independent at wheelchair  level    2) Interventions:  OT Self Care/ADL and OT Neuro Re-Ed/Balance            Goal: LTG-By discharge, patient will perform bathroom transfers     Dates: Start: 07/27/17      Description: 1) Individualized Goal: modified independent wheelchair level    2) Interventions:  OT Self Care/ADL and OT Neuro Re-Ed/Balance            Goal: LTG-By discharge, patient will complete basic home management     Dates: Start: 07/27/17      Description: 1) Individualized Goal:  Modified independent at a wheelchair level.    2) Interventions:  OT Self Care/ADL, OT Community Reintegration, OT Neuro Re-Ed/Balance, OT Therapeutic Activity and OT Therapeutic Exercise                  Section completed by:  Claudia Paris MS,OTR/L